# Patient Record
Sex: FEMALE | Race: BLACK OR AFRICAN AMERICAN | NOT HISPANIC OR LATINO | Employment: OTHER | URBAN - METROPOLITAN AREA
[De-identification: names, ages, dates, MRNs, and addresses within clinical notes are randomized per-mention and may not be internally consistent; named-entity substitution may affect disease eponyms.]

---

## 2017-01-06 ENCOUNTER — TELEPHONE (OUTPATIENT)
Dept: FAMILY MEDICINE | Facility: CLINIC | Age: 72
End: 2017-01-06

## 2017-01-06 NOTE — TELEPHONE ENCOUNTER
Reason for call:  Patient reporting a symptom    Symptom or request: voice gone, ears plugged, cough    Duration (how long have symptoms been present): a week    Have you been treated for this before? Yes    Additional comments:     Phone Number patient can be reached at:  Home number on file [phone] (home)    Best Time:      Can we leave a detailed message on this number:  YES    Call taken on 1/6/2017 at 4:03 PM by John Cordero

## 2017-01-06 NOTE — TELEPHONE ENCOUNTER
Patient recently took plane trip.  States grandkids have been sick also.    For past 7 days has had cough, plugged ears, and voice disappearing.  Denies sore throat.  Afebrile.  Has been pushing fluids and taking Tylenol PRN.  Also took Benadryl 1x; not effective, woke up with very dry mouth (will not take this again).  Generally feeling better than she was at start of URI.  Ears feel better, and cough isn't as frequent.    Advised rest, fluids, OTC meds as needed, and if not better to f/u on Monday or in UC this weekend.  Pt agrees with plan.  Eileen JORDAN RN

## 2017-01-09 ENCOUNTER — TELEPHONE (OUTPATIENT)
Dept: FAMILY MEDICINE | Facility: CLINIC | Age: 72
End: 2017-01-09

## 2017-01-09 ENCOUNTER — OFFICE VISIT (OUTPATIENT)
Dept: FAMILY MEDICINE | Facility: CLINIC | Age: 72
End: 2017-01-09
Payer: COMMERCIAL

## 2017-01-09 VITALS
HEART RATE: 102 BPM | HEIGHT: 65 IN | BODY MASS INDEX: 34.07 KG/M2 | WEIGHT: 204.5 LBS | OXYGEN SATURATION: 97 % | TEMPERATURE: 97.3 F

## 2017-01-09 DIAGNOSIS — J01.90 ACUTE SINUSITIS WITH SYMPTOMS > 10 DAYS: Primary | ICD-10-CM

## 2017-01-09 PROCEDURE — 99213 OFFICE O/P EST LOW 20 MIN: CPT | Performed by: PHYSICIAN ASSISTANT

## 2017-01-09 RX ORDER — DOXYCYCLINE 100 MG/1
100 CAPSULE ORAL 2 TIMES DAILY
Qty: 20 CAPSULE | Refills: 0 | Status: SHIPPED | OUTPATIENT
Start: 2017-01-09 | End: 2017-01-19

## 2017-01-09 NOTE — PROGRESS NOTES
"SUBJECTIVE:                  Harriet Lance is a 71 year old female patient, here alone, in today for:    Sore Throat:  yes   Onset: - Last Monday      Fever no    Chills/Sweats: YES-  Slight chill subsided    Headache (location?):  no    Sinus Pressure: no    Conjunctivitis:  no    Ear Pain:  YES    Rhinorrhea:  no    Congestion:  YES    Sore Throat:  Voice is gone horse voice   Cough:   YES-productive of green sputum    Wheeze:  no    Nausea:  no    Vomiting:  no    Diarrhea:  no    Fatigue/Achiness: YES    Sick/Strep Exposure:  Grand Kids   Therapies tried: tylenol   Outcome: moderate relief    She recently traveled, and on the place ride back, ear plugged and painful.  She progressed with cough and congestion.  Not taking anything OTC.      Problem list and histories reviewed & adjusted, as indicated.  Additional history: as documented    Problem list, Medication list, Allergies, and Medical/Social/Surgical histories reviewed in [facility] and updated as appropriate.    ROS:  Constitutional, HEENT, cardiovascular, pulmonary, gi and gu systems are negative, except as otherwise noted.    OBJECTIVE:                                                    BP   Pulse 102  Temp(Src) 97.3  F (36.3  C) (Tympanic)  Ht 5' 4.5\" (1.638 m)  Wt 204 lb 8 oz (92.761 kg)  BMI 34.57 kg/m2  SpO2 97%  Breastfeeding? No  Body mass index is 34.57 kg/(m^2).  GENERAL: alert and no distress  EYES: Eyes grossly normal to inspection  HENT: b/l TM dullness with loss of light reflux nasal mucosa is erythematous and edematous   NECK: no adenopathy, no asymmetry, masses, or scars and thyroid normal to palpation  RESP: lungs clear to auscultation - no rales, rhonchi or wheezes  CV: regular rate and rhythm, normal S1 S2, no S3 or S4, no murmur, click or rub, no peripheral edema and peripheral pulses strong  PSYCH: mentation appears normal, affect normal/bright    Diagnostic Test Results:  none      ASSESSMENT/PLAN:                                "                             1. Acute sinusitis with symptoms > 10 days  OTC probiotics while on antibiotic to help limit some potential side effects.   - doxycycline Monohydrate 100 MG CAPS; Take 1 capsule (100 mg) by mouth 2 times daily for 10 days  Dispense: 20 capsule; Refill: 0    Follow up if symptoms persist or worsen     George Zhao PA-C  [facility]

## 2017-01-09 NOTE — TELEPHONE ENCOUNTER
Name of caller: Harriet  Relationship to Patient: Self    Reason for Call: Patient states she would like a nurse to return call to discuss URI concerns. Please advise.     Best phone number to reach patient at is: [phone]  Ok to leave a message with medical info: Yes    Pharmacy preferred (if calling for a refill): NA

## 2017-01-09 NOTE — NURSING NOTE
"Chief Complaint   Patient presents with     URI     Pulse 102  Ht 5' 4.5\" (1.638 m)  Wt 204 lb 8 oz (92.761 kg)  BMI 34.57 kg/m2  SpO2 97%  Breastfeeding? No Estimated body mass index is 34.57 kg/(m^2) as calculated from the following:    Height as of this encounter: 5' 4.5\" (1.638 m).    Weight as of this encounter: 204 lb 8 oz (92.761 kg).  bp completed using cuff size: NA (Not Taken)      Health Maintenance addressed:  NONE    n/a                "

## 2017-01-09 NOTE — MR AVS SNAPSHOT
After Visit Summary   1/9/2017    Harriet Lance    MRN: [id number]           Patient Information     Date Of Birth          1945        Visit Information        Provider Department      1/9/2017 9:00 AM George Zhao PA-C [facility]        Today's Diagnoses     Acute sinusitis with symptoms > 10 days    -  1        Follow-ups after your visit        Your next 10 appointments already scheduled     Feb 06, 2017 10:15 AM   (Arrive by 10:00 AM)   MA SCREENING DIGITAL BILATERAL with [id number]   [facility] ([facility])    [address]  2nd Floor  [facility] [address] [phone]           Do not use any powder, lotion or deodorant under your arms or on your breast. If you do, we will ask you to remove it before your exam.  Wear comfortable, two-piece clothing.  If you have any allergies, tell your care team.  Bring any previous mammograms from other facilities or have them mailed to the breast center. This mammogram location, [facility], now offers 3D mammography. It doesn't replace a screening mammogram and can be done with a regular screening mammogram. It is optional and not all insurances will pay for it. 3D mammography is a special kind of mammogram that produces a three-dimensional image of the breast by using low dose-xrays. 3D allows the radiologist to see the breast tissue differently from 2D, which reduces the chance of repeat testing due to overlapping breast tissue. If you are interested in have a 3D mammogram, please check with your insurance before you arrive for your exam. 3D mammography is offered to all patients. On the day of your exam you will be asked to sign a form stating yes, you wish to have 3D imaging or, no, you decline.              Who to contact     If you have questions or need follow up information about today's clinic visit or your schedule please contact  "[facility] directly at [phone].  Normal or non-critical lab and imaging results will be communicated to you by MyChart, letter or phone within 4 business days after the clinic has received the results. If you do not hear from us within 7 days, please contact the clinic through PostalGuardhart or phone. If you have a critical or abnormal lab result, we will notify you by phone as soon as possible.  Submit refill requests through Borro or call your pharmacy and they will forward the refill request to us. Please allow 3 business days for your refill to be completed.          Additional Information About Your Visit        PostalGuardhart Information     Borro gives you secure access to your electronic health record. If you see a primary care provider, you can also send messages to your care team and make appointments. If you have questions, please call your primary care clinic.  If you do not have a primary care provider, please call [phone] and they will assist you.        Care EveryWhere ID     This is your Care EveryWhere ID. This could be used by other organizations to access your [place] medical records  [id number]        Your Vitals Were     Pulse Temperature Height BMI (Body Mass Index) Pulse Oximetry Breastfeeding?    102 97.3  F (36.3  C) (Tympanic) 5' 4.5\" (1.638 m) 34.57 kg/m2 97% No       Blood Pressure from Last 3 Encounters:   12/13/16 116/72   10/04/16 120/64   03/21/16 130/72    Weight from Last 3 Encounters:   01/09/17 204 lb 8 oz (92.761 kg)   12/13/16 209 lb 12.8 oz (95.165 kg)   10/04/16 220 lb (99.791 kg)              Today, you had the following     No orders found for display         Today's Medication Changes          These changes are accurate as of: 1/9/17  9:33 AM.  If you have any questions, ask your nurse or doctor.               Start taking these medicines.        Dose/Directions    doxycycline Monohydrate 100 MG Caps   Used for:  Acute sinusitis with symptoms > 10 days "   Started by:  George Zhao PA-C        Dose:  100 mg   Take 1 capsule (100 mg) by mouth 2 times daily for 10 days   Quantity:  20 capsule   Refills:  0            Where to get your medicines      These medications were sent to [id number] - [address] AT [address] [address] [address]     Phone:  [phone]    - doxycycline Monohydrate 100 MG Caps             Primary Care Provider Office Phone # Fax #    Jolanta DIAZ DO [place] [phone]       [facility] [address]  [facility] [address]        Thank you!     Thank you for choosing [facility]  for your care. Our goal is always to provide you with excellent care. Hearing back from our patients is one way we can continue to improve our services. Please take a few minutes to complete the written survey that you may receive in the mail after your visit with us. Thank you!             Your Updated Medication List - Protect others around you: Learn how to safely use, store and throw away your medicines at www.disposemymeds.org.          This list is accurate as of: 1/9/17  9:33 AM.  Always use your most recent med list.                   Brand Name Dispense Instructions for use    acetaminophen 325 MG tablet    TYLENOL    100 tablet    Take 1-2 tablets by mouth every 6 hours as needed for pain.       ALEVE PO      Take by mouth every morning       aspirin 81 MG tablet     100    1 tab po QD (Once per day)       calcium 600 MG tablet      Two capsules once epr day       Co Q10 100 MG Caps     30 capsule        doxycycline Monohydrate 100 MG Caps     20 capsule    Take 1 capsule (100 mg) by mouth 2 times daily for 10 days       fish oil-omega-3 fatty acids 1000 MG capsule      2 capsules once per day       Glucosamine HCl 1500 MG Tabs     30 tablet    Take by mouth daily       hydrochlorothiazide 25 MG tablet    HYDRODIURIL    90  tablet    Take 1 tablet (25 mg) by mouth daily       metoprolol 50 MG 24 hr tablet    TOPROL-XL    135 tablet    Take  by mouth daily. Take one at night and 1/2 tablet in the morning.       multivitamin Tabs per tablet     30 tablet    Take 2 tablets by mouth daily       pravastatin 40 MG tablet    PRAVACHOL    90 tablet    Take 1 tablet (40 mg) by mouth daily       vitamin D 2000 UNITS tablet     30    Take 1 tablet by mouth daily.       ZYRTEC 10 MG tablet   Generic drug:  cetirizine     30    1 TABLET DAILY

## 2017-01-09 NOTE — TELEPHONE ENCOUNTER
See TE from 1/6/2017    Throat now very sore from coughing.  Ear congestion continues/slightly worse.  Advised appt    Next 5 appointments (look out 90 days)     Jan 09, 2017  9:00 AM   SHORT with George Zhao PA-C   [facility] ([facility])    [address] [place]  [facility] [address]   [phone]                Eileen JORDAN RN

## 2017-01-12 ENCOUNTER — TELEPHONE (OUTPATIENT)
Dept: FAMILY MEDICINE | Facility: CLINIC | Age: 72
End: 2017-01-12

## 2017-01-12 NOTE — TELEPHONE ENCOUNTER
Reason for Call:  Other call back    Detailed comments: The Patient called to let TREMAINE Zhao know that she is still stuffy on the left side of her head  And she still has a cough. Also her BP has been high and tomorrow is her last day of pills, the patient wants to know what she should do next  Please call her back with advise for what to do next please      Phone Number Patient can be reached at: Home number on file [phone] (home)    Best Time: anytime    Can we leave a detailed message on this number? YES    Call taken on 1/12/2017 at 3:58 PM by Heather Orona

## 2017-01-13 NOTE — TELEPHONE ENCOUNTER
Spoke with patient.  Seen 1/9/17 for sinusitis.  Given Doxycycline.  States she is not feeling much better.  Informed patient she needs to complete the entire course of antibiotic.  Advised: push fluids, saline nasal spray, steam from warm shower or boiling pain of water on the stove/humidifer, warm moist cloth over her sinus area, under her eyes, cheekbones, OTC Tylenol or Ibuprofen for discomfort.    Patient to call back if symptoms worsen/has any other questions/concerns.  Patient verbalizes understanding.  Enid Shin RN

## 2017-01-26 ENCOUNTER — TELEPHONE (OUTPATIENT)
Dept: FAMILY MEDICINE | Facility: CLINIC | Age: 72
End: 2017-01-26

## 2017-01-26 NOTE — TELEPHONE ENCOUNTER
Reason for Call:  Other call back    Detailed comments: meds working to pt is still plugged up bad head hearing/voice still bad    Phone Number Patient can be reached at: Home number on file [phone] (home)    Best Time: any    Can we leave a detailed message on this number? YES    Call taken on 1/26/2017 at 10:34 AM by Devorah Hart

## 2017-03-03 ENCOUNTER — MYC MEDICAL ADVICE (OUTPATIENT)
Dept: FAMILY MEDICINE | Facility: CLINIC | Age: 72
End: 2017-03-03

## 2017-04-06 ENCOUNTER — TRANSFERRED RECORDS (OUTPATIENT)
Dept: HEALTH INFORMATION MANAGEMENT | Facility: CLINIC | Age: 72
End: 2017-04-06

## 2017-04-10 DIAGNOSIS — I82.409 DVT (DEEP VENOUS THROMBOSIS) (H): Primary | ICD-10-CM

## 2017-04-13 ENCOUNTER — TRANSFERRED RECORDS (OUTPATIENT)
Dept: HEALTH INFORMATION MANAGEMENT | Facility: CLINIC | Age: 72
End: 2017-04-13

## 2017-04-15 ENCOUNTER — TRANSFERRED RECORDS (OUTPATIENT)
Dept: HEALTH INFORMATION MANAGEMENT | Facility: CLINIC | Age: 72
End: 2017-04-15

## 2017-04-17 ENCOUNTER — TELEPHONE (OUTPATIENT)
Dept: FAMILY MEDICINE | Facility: CLINIC | Age: 72
End: 2017-04-17

## 2017-04-17 DIAGNOSIS — I82.409 DVT (DEEP VENOUS THROMBOSIS) (H): ICD-10-CM

## 2017-04-17 LAB — INR PPP: 1.55 (ref 0.86–1.14)

## 2017-04-17 PROCEDURE — 36415 COLL VENOUS BLD VENIPUNCTURE: CPT

## 2017-04-17 PROCEDURE — 85610 PROTHROMBIN TIME: CPT

## 2017-04-17 NOTE — TELEPHONE ENCOUNTER
Reason for call: Salo, sister, requesting today's INR to be faxed to [facility].    Phone Number Pt can be reached at:[phone]  Best Time: Anytime  Can we leave a detailed message on this number? Yes

## 2017-04-18 ENCOUNTER — TELEPHONE (OUTPATIENT)
Dept: FAMILY MEDICINE | Facility: CLINIC | Age: 72
End: 2017-04-18

## 2017-04-18 NOTE — TELEPHONE ENCOUNTER
Reason for Call:  INR results    Detailed comments: pt's sister called stating [facility]  Still hasn't received pt's INR levels  Please fax them asap to [phone]     Phone Number Patient can be reached at: [phone]    Best Time:     Can we leave a detailed message on this number? YES    Call taken on 4/18/2017 at 10:04 AM by Marzena Parsons

## 2017-04-18 NOTE — TELEPHONE ENCOUNTER
Patient's sister called back and asked that this gets taken care of  Pleaser call with her INR Level asap Please    Thank you

## 2017-04-18 NOTE — TELEPHONE ENCOUNTER
Erickson Ortiz in lab INR was faxed to [facility] end of the work day yesterday.  Gave patient's sister Salo the INR result from yesterday 1.55.  Patient hard of hearing - ok'd by patient to give result.  Enid Shin RN

## 2017-04-20 DIAGNOSIS — I82.409 DVT (DEEP VENOUS THROMBOSIS) (H): ICD-10-CM

## 2017-04-20 LAB — INR PPP: 3.6 (ref 0.86–1.14)

## 2017-04-20 PROCEDURE — 36416 COLLJ CAPILLARY BLOOD SPEC: CPT

## 2017-04-20 PROCEDURE — 85610 PROTHROMBIN TIME: CPT

## 2017-04-24 DIAGNOSIS — I82.409 DVT (DEEP VENOUS THROMBOSIS) (H): ICD-10-CM

## 2017-04-24 LAB — INR PPP: 2.1 (ref 0.86–1.14)

## 2017-04-24 PROCEDURE — 36416 COLLJ CAPILLARY BLOOD SPEC: CPT

## 2017-04-24 PROCEDURE — 85610 PROTHROMBIN TIME: CPT

## 2017-04-27 DIAGNOSIS — I82.409 DVT (DEEP VENOUS THROMBOSIS) (H): ICD-10-CM

## 2017-04-27 LAB — INR PPP: 2.8 (ref 0.86–1.14)

## 2017-04-27 PROCEDURE — 36416 COLLJ CAPILLARY BLOOD SPEC: CPT

## 2017-04-27 PROCEDURE — 85610 PROTHROMBIN TIME: CPT

## 2017-05-01 DIAGNOSIS — I82.409 DVT (DEEP VENOUS THROMBOSIS) (H): ICD-10-CM

## 2017-05-01 LAB — INR PPP: 2.8 (ref 0.86–1.14)

## 2017-05-01 PROCEDURE — 36416 COLLJ CAPILLARY BLOOD SPEC: CPT | Performed by: INTERNAL MEDICINE

## 2017-05-01 PROCEDURE — 85610 PROTHROMBIN TIME: CPT | Performed by: INTERNAL MEDICINE

## 2017-05-23 ENCOUNTER — TELEPHONE (OUTPATIENT)
Dept: FAMILY MEDICINE | Facility: CLINIC | Age: 72
End: 2017-05-23

## 2017-05-23 NOTE — TELEPHONE ENCOUNTER
Form(s) have been mailed.  Faxed or mailed to: patient's home address, and to preaddressed   Was a copy sent to scanning?   yes sent to scanning on [place] side    Maximiliano Hays CMA

## 2017-05-23 NOTE — TELEPHONE ENCOUNTER
Received form(s) from [facility] form - HR Employee Request for Accommodation.  Placed form(s) in/on PN's box.  Forms need to be filled out and signed and mailed- in envelope provided.    Call pt to verify form was sent: No  Copy needs to be sent for scanning after completion: Yes

## 2017-07-19 ENCOUNTER — TRANSFERRED RECORDS (OUTPATIENT)
Dept: HEALTH INFORMATION MANAGEMENT | Facility: CLINIC | Age: 72
End: 2017-07-19

## 2017-09-07 ENCOUNTER — RADIANT APPOINTMENT (OUTPATIENT)
Dept: GENERAL RADIOLOGY | Facility: CLINIC | Age: 72
End: 2017-09-07
Attending: PREVENTIVE MEDICINE
Payer: COMMERCIAL

## 2017-09-07 ENCOUNTER — OFFICE VISIT (OUTPATIENT)
Dept: FAMILY MEDICINE | Facility: CLINIC | Age: 72
End: 2017-09-07
Payer: COMMERCIAL

## 2017-09-07 ENCOUNTER — TELEPHONE (OUTPATIENT)
Dept: FAMILY MEDICINE | Facility: CLINIC | Age: 72
End: 2017-09-07

## 2017-09-07 VITALS
HEIGHT: 65 IN | HEART RATE: 70 BPM | SYSTOLIC BLOOD PRESSURE: 122 MMHG | BODY MASS INDEX: 34.05 KG/M2 | WEIGHT: 204.4 LBS | OXYGEN SATURATION: 96 % | DIASTOLIC BLOOD PRESSURE: 70 MMHG | TEMPERATURE: 97.9 F

## 2017-09-07 DIAGNOSIS — M25.562 LEFT KNEE PAIN, UNSPECIFIED CHRONICITY: Primary | ICD-10-CM

## 2017-09-07 DIAGNOSIS — M25.562 LEFT KNEE PAIN, UNSPECIFIED CHRONICITY: ICD-10-CM

## 2017-09-07 DIAGNOSIS — J30.1 CHRONIC SEASONAL ALLERGIC RHINITIS DUE TO POLLEN: ICD-10-CM

## 2017-09-07 DIAGNOSIS — R79.89 D-DIMER, ELEVATED: Primary | ICD-10-CM

## 2017-09-07 PROCEDURE — 36415 COLL VENOUS BLD VENIPUNCTURE: CPT | Performed by: PREVENTIVE MEDICINE

## 2017-09-07 PROCEDURE — 99214 OFFICE O/P EST MOD 30 MIN: CPT | Performed by: PREVENTIVE MEDICINE

## 2017-09-07 PROCEDURE — 85379 FIBRIN DEGRADATION QUANT: CPT | Performed by: PREVENTIVE MEDICINE

## 2017-09-07 PROCEDURE — 73562 X-RAY EXAM OF KNEE 3: CPT

## 2017-09-07 RX ORDER — FLUTICASONE PROPIONATE 50 MCG
2 SPRAY, SUSPENSION (ML) NASAL DAILY
Qty: 1 BOTTLE | Refills: 0 | Status: SHIPPED | OUTPATIENT
Start: 2017-09-07 | End: 2018-09-04

## 2017-09-07 NOTE — PROGRESS NOTES
"SUBJECTIVE:  Harriet Lance, a 72 year old female scheduled an appointment to discuss the following issues:     Left knee pain, unspecified chronicity  Chronic seasonal allergic rhinitis due to pollen  Pt with history of l hip OA and now with some pain behind her left knee for weeks, worse in past couple days  No trauma  No skin changes  No swelling, or warmth  History of r minnie 6 months ago, took coumadin for some time after for dvt prevention  No personal hx of blood clots   No recent significant travel  L hip hurts a lot which limits pt's mobility, uses cane  Also with some congestion and post nasal drip on and off    Medical, social, surgical, and family histories reviewed.    ROS:  C: NEGATIVE for fever, chills  E: NEGATIVE for vision changes   R: NEGATIVE for significant cough or SOB  CV: NEGATIVE for chest pain, palpitations   GI: NEGATIVE for nausea, abdominal pain, heartburn, or change in bowel habits  : NEGATIVE for frequency, dysuria, or hematuria  M: NEGATIVE for significant arthralgias or myalgia  N: NEGATIVE for weakness, dizziness or paresthesias or headache    OBJECTIVE:  /70  Pulse 70  Temp 97.9  F (36.6  C) (Oral)  Ht 5' 4.5\" (1.638 m)  Wt 204 lb 6.4 oz (92.7 kg)  SpO2 96%  BMI 34.54 kg/m2  EXAM:  GENERAL APPEARANCE: healthy, alert and no distress  EYES: EOMI,  PERRL  HENT: ear canals and TM's normal and nose and mouth without ulcers or lesions  RESP: lungs clear to auscultation - no rales, rhonchi or wheezes  CV: regular rates and rhythm, normal S1 S2, no S3 or S4 and no murmur, click or rub -  ABDOMEN:  soft, nontender, no HSM or masses and bowel sounds normal  MSK - left knee - medial jt line ttp, neg ant, post drawer, neg mcmurrarys, trace effusion, no warmth, no erythema, nl rom, nl gait  EXTREMITIES:  Warm, well perfused, no edema    ASSESSMENT/PLAN:  (M25.562) Left knee pain, unspecified chronicity  (primary encounter diagnosis)  Plan: D dimer, quantitative, XR Knee Left 1/2 " Views  ?OA knee vs referred pain from hip vs dvt (less likely)  Will do left knee xray  Also d dimer  Advise tylenol 500 mg tid prn for pain    (J30.1) Chronic seasonal allergic rhinitis due to pollen  Plan: fluticasone (FLONASE) 50 MCG/ACT spray  Will try flonase nasal spray    25 minutes spent with patient, over 50% time counseling, coordinating care and explaining about nature of the patient's conditions.  All risks, benefits of treatment and further evaluation was reviewed with patient.  Pt expressed understanding.  Pt was in agreement with this plan.  Ozzie Gonzalez MD

## 2017-09-07 NOTE — MR AVS SNAPSHOT
"              After Visit Summary   9/7/2017    Harriet Lance    MRN: [id number]           Patient Information     Date Of Birth          1945        Visit Information        Provider Department      9/7/2017 3:00 PM Ozzie Gonzalez MD [facility]         Follow-ups after your visit        Who to contact     If you have questions or need follow up information about today's clinic visit or your schedule please contact [facility] directly at [phone].  Normal or non-critical lab and imaging results will be communicated to you by MyChart, letter or phone within 4 business days after the clinic has received the results. If you do not hear from us within 7 days, please contact the clinic through Roamerhart or phone. If you have a critical or abnormal lab result, we will notify you by phone as soon as possible.  Submit refill requests through Merchantry or call your pharmacy and they will forward the refill request to us. Please allow 3 business days for your refill to be completed.          Additional Information About Your Visit        MyChart Information     Merchantry gives you secure access to your electronic health record. If you see a primary care provider, you can also send messages to your care team and make appointments. If you have questions, please call your primary care clinic.  If you do not have a primary care provider, please call [phone] and they will assist you.        Care EveryWhere ID     This is your Care EveryWhere ID. This could be used by other organizations to access your [place] medical records  [id number]        Your Vitals Were     Pulse Temperature Height Pulse Oximetry BMI (Body Mass Index)       70 97.9  F (36.6  C) (Oral) 5' 4.5\" (1.638 m) 96% 34.54 kg/m2        Blood Pressure from Last 3 Encounters:   09/07/17 122/70   12/13/16 116/72   10/04/16 120/64    Weight from Last 3 Encounters:   09/07/17 204 lb 6.4 oz (92.7 kg)   01/09/17 204 lb " 8 oz (92.8 kg)   12/13/16 209 lb 12.8 oz (95.2 kg)              Today, you had the following     No orders found for display       Primary Care Provider Office Phone # Fax #    Jolanta Wilkerson [phone] [address] [address]        Equal Access to Services     [facility]: Hadii aad ku hadasho Soomaali, waaxda luqadaha, qaybta kaalmada adeegyada, waxay idiin hayaan adeeg kharash laalirio . So [facility] [phone].    ATENCIÓN: Si habla español, tiene a saba disposición servicios gratuitos de asistencia lingüística. Llame al [phone].    We comply with applicable federal civil rights laws and [place] laws. We do not discriminate on the basis of race, color, national origin, age, disability sex, sexual orientation or gender identity.            Thank you!     Thank you for choosing [facility]  for your care. Our goal is always to provide you with excellent care. Hearing back from our patients is one way we can continue to improve our services. Please take a few minutes to complete the written survey that you may receive in the mail after your visit with us. Thank you!             Your Updated Medication List - Protect others around you: Learn how to safely use, store and throw away your medicines at www.disposemymeds.org.          This list is accurate as of: 9/7/17  3:26 PM.  Always use your most recent med list.                   Brand Name Dispense Instructions for use Diagnosis    acetaminophen 325 MG tablet    TYLENOL    100 tablet    Take 1-2 tablets by mouth every 6 hours as needed for pain.    Right thigh pain       ALEVE PO      Take by mouth every morning        aspirin 81 MG tablet     100    1 tab po QD (Once per day)        calcium 600 MG tablet      Two capsules once epr day        Co Q10 100 MG Caps     30 capsule         fish oil-omega-3 fatty acids 1000 MG capsule      2 capsules once per day        hydrochlorothiazide 25 MG tablet     HYDRODIURIL    90 tablet    Take 1 tablet (25 mg) by mouth daily    Hypertension goal BP (blood pressure) < 140/90       metoprolol 50 MG 24 hr tablet    TOPROL-XL    135 tablet    Take  by mouth daily. Take one at night and 1/2 tablet in the morning.    Hypertension goal BP (blood pressure) < 140/90       multivitamin Tabs per tablet     30 tablet    Take 2 tablets by mouth daily        pravastatin 40 MG tablet    PRAVACHOL    90 tablet    Take 1 tablet (40 mg) by mouth daily    Hyperlipidemia with target LDL less than 130       vitamin D 2000 UNITS tablet     30    Take 1 tablet by mouth daily.        ZYRTEC 10 MG tablet   Generic drug:  cetirizine     30    1 TABLET DAILY    Allergic rhinitis, cause unspecified

## 2017-09-07 NOTE — NURSING NOTE
"Chief Complaint   Patient presents with     Musculoskeletal Problem       Initial /70  Pulse 70  Temp 97.9  F (36.6  C) (Oral)  Ht 5' 4.5\" (1.638 m)  Wt 204 lb 6.4 oz (92.7 kg)  SpO2 96%  BMI 34.54 kg/m2 Estimated body mass index is 34.54 kg/(m^2) as calculated from the following:    Height as of this encounter: 5' 4.5\" (1.638 m).    Weight as of this encounter: 204 lb 6.4 oz (92.7 kg).  Medication Reconciliation: complete      Health Maintenance that is potentially due pending provider review:  Colonoscopy/FIT    Pt is already scheduled for colonoscopy on 102/2017 with [facility].    TANMAY Gore  "

## 2017-09-07 NOTE — TELEPHONE ENCOUNTER
Reason for call:  Patient reporting a symptom    Symptom or request: sharp pains in Left leg    Duration (how long have symptoms been present): intermittently for  Months, but getting worse recently    Have you been treated for this before? No    Additional comments: wondering what she can try to do about it    Phone Number patient can be reached at:  Work number on file:  [phone] (work)    Best Time:  anytime    Can we leave a detailed message on this number:  NO    Call taken on 9/7/2017 at 10:35 AM by Gayathri Harp

## 2017-09-07 NOTE — TELEPHONE ENCOUNTER
Sharp pain noted to (L) calf  Denies warmth and redness to area, SOB, chest pain  Pain started a couple days ago, is intermittent  Pain worst when at rest, but better with activity  States she is on Warfarin post hip surgery, but has not had a clot before.  Advised appt for assessment.    Next 5 appointments (look out 90 days)     Sep 07, 2017  3:00 PM CDT   Office Visit with Ozzie Gonzalez MD   [facility] [place] ([facility])    [address]  [facility] [address] [phone]                Eileen JORDAN RN

## 2017-09-08 LAB — D DIMER PPP FEU-MCNC: 1.4 UG/ML FEU (ref 0–0.5)

## 2017-09-11 ENCOUNTER — TELEPHONE (OUTPATIENT)
Dept: FAMILY MEDICINE | Facility: CLINIC | Age: 72
End: 2017-09-11

## 2017-09-11 NOTE — TELEPHONE ENCOUNTER
Notes Recorded by Ozzie Gonzalez MD on 9/11/2017 at 8:49 AM  Please call pt to let her know that her d dimer is elevated.  We cannot rule out a dvt in her left leg.  I have ordered a left leg ultrasound for her to further evaluate.    Below # is incorrect  Called patient at work # [phone] (work)  Informed her of message from Dr Gonzalez and gave her # to [facility] to schedule US    Also informed pt if new s/s (SOB, chest pain, stroke symptoms, etc., needs to be seen in ER right away)  Pt agrees with plan, will call to schedule US  Eileen JORDAN RN

## 2017-09-11 NOTE — TELEPHONE ENCOUNTER
Reason for Call:  Other     Detailed comments: patient wondering if and how she is suppose to   Follow up, if she is suppose to have imaging     Phone Number Patient can be reached at: Cell number on file:    [phone]       Best Time: anytime    Can we leave a detailed message on this number? YES    Call taken on 9/11/2017 at 3:48 PM by Gayathri Harp

## 2017-09-12 ENCOUNTER — TELEPHONE (OUTPATIENT)
Dept: FAMILY MEDICINE | Facility: CLINIC | Age: 72
End: 2017-09-12

## 2017-09-12 NOTE — TELEPHONE ENCOUNTER
Patient informed of result  States her question then is if she could have diabetes.    Reviewed recent lab results - last glucose 12/2016, noted by PN:  The glucose is a little high putting you at risk to develop diabetes.  We should plan to recheck in one year.     Patient scheduled physical for December 2017  Advised in the meantime pt work on diet and exercise  Patient states she thinks last results off d/t hip replacement    Will call if further questions/concerns prior to scheduled PE  Eileen JORDAN RN

## 2017-09-12 NOTE — TELEPHONE ENCOUNTER
Please call pt to let her know that her left lower extremity ultrasound shows no evidence of blood clot (dvt).

## 2017-09-29 ENCOUNTER — HEALTH MAINTENANCE LETTER (OUTPATIENT)
Age: 72
End: 2017-09-29

## 2017-10-02 ENCOUNTER — TRANSFERRED RECORDS (OUTPATIENT)
Dept: HEALTH INFORMATION MANAGEMENT | Facility: CLINIC | Age: 72
End: 2017-10-02

## 2017-10-11 PROBLEM — D12.6 BENIGN NEOPLASM OF COLON: Status: ACTIVE | Noted: 2017-10-11

## 2017-11-17 DIAGNOSIS — I10 HYPERTENSION GOAL BP (BLOOD PRESSURE) < 140/90: ICD-10-CM

## 2017-11-17 DIAGNOSIS — E78.5 HYPERLIPIDEMIA WITH TARGET LDL LESS THAN 130: ICD-10-CM

## 2017-11-17 RX ORDER — PRAVASTATIN SODIUM 40 MG
TABLET ORAL
Start: 2017-11-17

## 2017-11-17 RX ORDER — METOPROLOL SUCCINATE 50 MG/1
TABLET, EXTENDED RELEASE ORAL
Start: 2017-11-17

## 2017-11-17 NOTE — TELEPHONE ENCOUNTER
Denied both requests  Refill requests too early; Rx's sent 12/13/2017 for 1 year  Eileen JORDAN RN    Requested Prescriptions   Pending Prescriptions Disp Refills     pravastatin (PRAVACHOL) 40 MG tablet [Pharmacy Med Name: PRAVASTATIN 40MG TABLETS] 90 tablet 0     Sig: TAKE 1 TABLET(40 MG) BY MOUTH DAILY    Statins Protocol Passed    11/17/2017  8:31 AM       Passed - LDL on file in past 12 months    Recent Labs   Lab Test  12/13/16   1001   LDL  78            Passed - No abnormal creatine kinase in past 12 months    No lab results found.         Passed - Recent or future visit with authorizing provider    Patient had office visit in the last year or has a visit in the next 30 days with authorizing provider.  See chart review.              Passed - Patient is age 18 or older       Passed - No active pregnancy on record       Passed - No positive pregnancy test in past 12 months        metoprolol (TOPROL-XL) 50 MG 24 hr tablet [Pharmacy Med Name: METOPROLOL ER SUCCINATE 50MG TABS] 135 tablet 0     Sig: TAKE 1 AND ONE-HALF TABLET BY MOUTH EVERY MORNING    Beta-Blockers Protocol Passed    11/17/2017  8:31 AM       Passed - Blood pressure under 140/90    BP Readings from Last 3 Encounters:   09/07/17 122/70   12/13/16 116/72   10/04/16 120/64                Passed - Patient is age 6 or older       Passed - Recent or future visit with authorizing provider's specialty    Patient had office visit in the last year or has a visit in the next 30 days with authorizing provider.  See chart review.

## 2017-12-15 ENCOUNTER — OFFICE VISIT (OUTPATIENT)
Dept: FAMILY MEDICINE | Facility: CLINIC | Age: 72
End: 2017-12-15
Payer: COMMERCIAL

## 2017-12-15 VITALS
TEMPERATURE: 97.6 F | BODY MASS INDEX: 30.46 KG/M2 | HEART RATE: 72 BPM | WEIGHT: 182.8 LBS | SYSTOLIC BLOOD PRESSURE: 98 MMHG | HEIGHT: 65 IN | DIASTOLIC BLOOD PRESSURE: 56 MMHG

## 2017-12-15 DIAGNOSIS — Z00.00 ENCOUNTER FOR ROUTINE ADULT HEALTH EXAMINATION WITHOUT ABNORMAL FINDINGS: Primary | ICD-10-CM

## 2017-12-15 DIAGNOSIS — E78.5 HYPERLIPIDEMIA WITH TARGET LDL LESS THAN 130: ICD-10-CM

## 2017-12-15 DIAGNOSIS — Z23 VACCINE FOR DIPHTHERIA-TETANUS-PERTUSSIS, COMBINED: ICD-10-CM

## 2017-12-15 DIAGNOSIS — I10 HYPERTENSION GOAL BP (BLOOD PRESSURE) < 140/90: ICD-10-CM

## 2017-12-15 DIAGNOSIS — M16.10 ARTHRITIS OF HIP: ICD-10-CM

## 2017-12-15 LAB
ALBUMIN SERPL-MCNC: 3.5 G/DL (ref 3.4–5)
ALP SERPL-CCNC: 85 U/L (ref 40–150)
ALT SERPL W P-5'-P-CCNC: 20 U/L (ref 0–50)
ANION GAP SERPL CALCULATED.3IONS-SCNC: 10 MMOL/L (ref 3–14)
AST SERPL W P-5'-P-CCNC: 13 U/L (ref 0–45)
BILIRUB SERPL-MCNC: 0.3 MG/DL (ref 0.2–1.3)
BUN SERPL-MCNC: 15 MG/DL (ref 7–30)
CALCIUM SERPL-MCNC: 9.6 MG/DL (ref 8.5–10.1)
CHLORIDE SERPL-SCNC: 104 MMOL/L (ref 94–109)
CHOLEST SERPL-MCNC: 143 MG/DL
CO2 SERPL-SCNC: 26 MMOL/L (ref 20–32)
CREAT SERPL-MCNC: 0.89 MG/DL (ref 0.52–1.04)
ERYTHROCYTE [DISTWIDTH] IN BLOOD BY AUTOMATED COUNT: 15.9 % (ref 10–15)
GFR SERPL CREATININE-BSD FRML MDRD: 62 ML/MIN/1.7M2
GLUCOSE SERPL-MCNC: 89 MG/DL (ref 70–99)
HCT VFR BLD AUTO: 40.8 % (ref 35–47)
HDLC SERPL-MCNC: 51 MG/DL
HGB BLD-MCNC: 13.5 G/DL (ref 11.7–15.7)
LDLC SERPL CALC-MCNC: 67 MG/DL
MCH RBC QN AUTO: 25.4 PG (ref 26.5–33)
MCHC RBC AUTO-ENTMCNC: 33.1 G/DL (ref 31.5–36.5)
MCV RBC AUTO: 77 FL (ref 78–100)
NONHDLC SERPL-MCNC: 92 MG/DL
PLATELET # BLD AUTO: 278 10E9/L (ref 150–450)
POTASSIUM SERPL-SCNC: 3.1 MMOL/L (ref 3.4–5.3)
PROT SERPL-MCNC: 8.2 G/DL (ref 6.8–8.8)
RBC # BLD AUTO: 5.31 10E12/L (ref 3.8–5.2)
SODIUM SERPL-SCNC: 140 MMOL/L (ref 133–144)
TRIGL SERPL-MCNC: 125 MG/DL
WBC # BLD AUTO: 6.8 10E9/L (ref 4–11)

## 2017-12-15 PROCEDURE — 85027 COMPLETE CBC AUTOMATED: CPT | Performed by: FAMILY MEDICINE

## 2017-12-15 PROCEDURE — 90471 IMMUNIZATION ADMIN: CPT | Performed by: FAMILY MEDICINE

## 2017-12-15 PROCEDURE — 80053 COMPREHEN METABOLIC PANEL: CPT | Performed by: FAMILY MEDICINE

## 2017-12-15 PROCEDURE — 90715 TDAP VACCINE 7 YRS/> IM: CPT | Performed by: FAMILY MEDICINE

## 2017-12-15 PROCEDURE — 36415 COLL VENOUS BLD VENIPUNCTURE: CPT | Performed by: FAMILY MEDICINE

## 2017-12-15 PROCEDURE — 80061 LIPID PANEL: CPT | Performed by: FAMILY MEDICINE

## 2017-12-15 PROCEDURE — 99397 PER PM REEVAL EST PAT 65+ YR: CPT | Performed by: FAMILY MEDICINE

## 2017-12-15 RX ORDER — HYDROCHLOROTHIAZIDE 25 MG/1
25 TABLET ORAL DAILY
Qty: 90 TABLET | Refills: 3 | Status: SHIPPED | OUTPATIENT
Start: 2017-12-15 | End: 2018-11-28

## 2017-12-15 RX ORDER — METOPROLOL SUCCINATE 50 MG/1
TABLET, EXTENDED RELEASE ORAL
Qty: 90 TABLET | Refills: 3 | Status: SHIPPED | OUTPATIENT
Start: 2017-12-15 | End: 2018-03-27

## 2017-12-15 RX ORDER — PRAVASTATIN SODIUM 40 MG
40 TABLET ORAL DAILY
Qty: 90 TABLET | Refills: 3 | Status: SHIPPED | OUTPATIENT
Start: 2017-12-15 | End: 2018-11-28

## 2017-12-15 NOTE — PROGRESS NOTES
SUBJECTIVE:   Harriet Lance is a 72 year old female who presents for Preventive Visit.      Are you in the first 12 months of your Medicare Part B coverage?  No    Healthy Habits:    Do you get at least three servings of calcium containing foods daily (dairy, green leafy vegetables, etc.)? no    Amount of exercise or daily activities, outside of work: 3 day(s) per week    Problems taking medications regularly No    Medication side effects: No    Have you had an eye exam in the past two years? yes    Do you see a dentist twice per year? yes    Do you have sleep apnea, excessive snoring or daytime drowsiness?no      Ability to successfully perform activities of daily living: Yes, no assistance needed    Home safety:  none identified     Hearing impairment: Yes, hearing aids in both ears    Fall risk:  Fallen 2 or more times in the past year?: No  Any fall with injury in the past year?: No          COGNITIVE SCREEN  1) Repeat 3 items (Banana, Sunrise, Chair)    2) Clock draw: NORMAL  3) 3 item recall: Recalls 2 objects   Results: NORMAL clock, 1-2 items recalled: COGNITIVE IMPAIRMENT LESS LIKELY    Mini-CogTM Copyright CARTER Randhawa. Licensed by the author for use in [facility]; reprinted with permission (stefan@[email or web address]). All rights reserved.                -------------------------------------    Reviewed and updated as needed this visit by clinical staffTobacco  Allergies  Meds  Med Hx  Surg Hx  Fam Hx  Soc Hx        Reviewed and updated as needed this visit by Provider  Meds  Surg Hx        Social History   Substance Use Topics     Smoking status: Never Smoker     Smokeless tobacco: Never Used     Alcohol use 0.0 oz/week     0 Standard drinks or equivalent per week      Comment: rarely       If you drink alcohol do you typically have >3 drinks per day or >7 drinks per week? No                        Today's PHQ-2 Score:   PHQ-2 ( 1999 Pfizer) 12/15/2017 9/7/2017   Q1: Little interest or pleasure  in doing things 0 0   Q2: Feeling down, depressed or hopeless 0 0   PHQ-2 Score 0 0         Do you feel safe in your environment - YES    Do you have a Health Care Directive?: No: Advance care planning was reviewed with patient; patient declined at this time.      Current providers sharing in care for this patient include: Patient Care Team:  Jolanta Wilkerson DO as PCP - Jane Leyva APRN CNP as Nurse Practitioner (Neurology)  Amaury Ledesma MD as MD (Family Medicine - Sports Medicine)    The following health maintenance items are reviewed in Epic and correct as of today:  Health Maintenance   Topic Date Due     ADVANCE DIRECTIVE PLANNING Q5 YRS  08/01/2017     TETANUS IMMUNIZATION (SYSTEM ASSIGNED)  10/31/2017     FALL RISK ASSESSMENT  12/13/2017     MAMMO SCREEN Q2 YR (SYSTEM ASSIGNED)  02/06/2019     LIPID SCREEN Q5 YR FEMALE (SYSTEM ASSIGNED)  12/13/2021     COLONOSCOPY Q5 YR  10/02/2022     INFLUENZA VACCINE (SYSTEM ASSIGNED)  Completed     DEXA SCAN SCREENING (SYSTEM ASSIGNED)  Completed     PNEUMOCOCCAL  Completed     HEPATITIS C SCREENING  Completed     Patient Active Problem List   Diagnosis     Allergic rhinitis     Diffuse cystic mastopathy     History of malignant neoplasm of large intestine     Enlarged lymph nodes     CARDIOVASCULAR SCREENING; LDL GOAL LESS THAN 100     Glaucoma     Hypertension goal BP (blood pressure) < 140/90     Advanced directives, counseling/discussion     Arthritis of hip     HL (hearing loss)     Hyperlipidemia with target LDL less than 130     BMI 37.0-37.9, adult     Benign neoplasm of colon     Past Surgical History:   Procedure Laterality Date     AS TOTAL HIP ARTHROPLASTY Right 04/13/2017    done at [facility]     C REMOVAL COLON/ILEOSTOMY  1998    8 inches of colon removed - not clear what section       Social History   Substance Use Topics     Smoking status: Never Smoker     Smokeless tobacco: Never Used     Alcohol use 0.0 oz/week  "    0 Standard drinks or equivalent per week      Comment: rarely     Family History   Problem Relation Age of Onset     HEART DISEASE Mother       of heart Attack at age 75     CANCER Father      Prostate     DIABETES Father      Type II     Cancer - colorectal Maternal Grandmother      Colon Resection     Breast Cancer Maternal Grandmother      Mastectomy     Cancer - colorectal Maternal Uncle      Hypertension Mother      Prostate Cancer Father      Allergies Father              Mammogram Screening: Patient over age 50, mutual decision to screen reflected in health maintenance.  Has lost weight - intentional   Feels a little dizziness if she stands to quickly -       ROS:  Constitutional, HEENT, cardiovascular, pulmonary, GI, , musculoskeletal, neuro, skin, endocrine and psych systems are negative, except as otherwise noted.      OBJECTIVE:   BP 98/56  Pulse 72  Temp 97.6  F (36.4  C) (Oral)  Ht 5' 4.5\" (1.638 m)  Wt 182 lb 12.8 oz (82.9 kg)  BMI 30.89 kg/m2 Estimated body mass index is 30.89 kg/(m^2) as calculated from the following:    Height as of this encounter: 5' 4.5\" (1.638 m).    Weight as of this encounter: 182 lb 12.8 oz (82.9 kg).  EXAM:   GENERAL APPEARANCE: alert and no distress  EYES: Eyes grossly normal to inspection, PERRL and conjunctivae and sclerae normal  HENT: ear canals and TM's normal, nose and mouth without ulcers or lesions, oropharynx clear and oral mucous membranes moist  NECK: no adenopathy, no asymmetry, masses, or scars and thyroid normal to palpation  RESP: lungs clear to auscultation - no rales, rhonchi or wheezes  BREAST: normal without masses, tenderness or nipple discharge and no palpable axillary masses or adenopathy  CV: regular rate and rhythm, normal S1 S2, no S3 or S4, no murmur, click or rub, no peripheral edema and peripheral pulses strong  ABDOMEN: soft, nontender, no hepatosplenomegaly, no masses and bowel sounds normal  MS: no musculoskeletal defects are " "noted and gait is age appropriate without ataxia  SKIN: no suspicious lesions or rashes  NEURO: Normal strength and tone, sensory exam grossly normal, mentation intact and speech normal  PSYCH: mentation appears normal and affect normal/bright    ASSESSMENT / PLAN:   1. Encounter for routine adult health examination without abnormal findings  Routine screening    2. Hypertension goal BP (blood pressure) < 140/90  BP controlled and with weight loss this is lower and symptomatic at times  Will decerase toprol dose - was on 50mg HS and 25mg in AM  Will drop the AM dose  Continue HCTZ - labs pending  - metoprolol (TOPROL-XL) 50 MG 24 hr tablet; Take one tablet at bedtime  Dispense: 90 tablet; Refill: 3  - hydrochlorothiazide (HYDRODIURIL) 25 MG tablet; Take 1 tablet (25 mg) by mouth daily  Dispense: 90 tablet; Refill: 3  - Comprehensive metabolic panel (BMP + Alb, Alk Phos, ALT, AST, Total. Bili, TP)  - Lipid panel reflex to direct LDL Fasting  - CBC with platelets    3. Hyperlipidemia with target LDL less than 130  Well controlled   Rechecking labs  - pravastatin (PRAVACHOL) 40 MG tablet; Take 1 tablet (40 mg) by mouth daily  Dispense: 90 tablet; Refill: 3  - Comprehensive metabolic panel (BMP + Alb, Alk Phos, ALT, AST, Total. Bili, TP)  - Lipid panel reflex to direct LDL Fasting  - CBC with platelets    4. Arthritis of hip  At [place] - had replaced     5. Vaccine for diphtheria-tetanus-pertussis, combined  given  - TDAP (ADACEL)    End of Life Planning:  Patient currently has an advanced directive: Yes.  Practitioner is supportive of decision.    COUNSELING:  Reviewed preventive health counseling, as reflected in patient instructions        Estimated body mass index is 30.89 kg/(m^2) as calculated from the following:    Height as of this encounter: 5' 4.5\" (1.638 m).    Weight as of this encounter: 182 lb 12.8 oz (82.9 kg).  Weight management plan: Discussed healthy diet and exercise guidelines and patient will follow " up in 12 months in clinic to re-evaluate.     reports that she has never smoked. She has never used smokeless tobacco.        Appropriate preventive services were discussed with this patient, including applicable screening as appropriate for cardiovascular disease, diabetes, osteopenia/osteoporosis, and glaucoma.  As appropriate for age/gender, discussed screening for colorectal cancer, prostate cancer, breast cancer, and cervical cancer. Checklist reviewing preventive services available has been given to the patient.    Reviewed patients plan of care and provided an AVS. The Basic Care Plan (routine screening as documented in Health Maintenance) for [place] meets the Care Plan requirement. This Care Plan has been established and reviewed with the Patient.    Counseling Resources:  ATP IV Guidelines  Pooled Cohorts Equation Calculator  Breast Cancer Risk Calculator  FRAX Risk Assessment  ICSI Preventive Guidelines  Dietary Guidelines for Americans, 2010  USDA's MyPlate  ASA Prophylaxis  Lung CA Screening    Jolanta Wilkerson,   [facility]

## 2017-12-15 NOTE — MR AVS SNAPSHOT
After Visit Summary   12/15/2017    Harriet Lance    MRN: [id number]           Patient Information     Date Of Birth          1945        Visit Information        Provider Department      12/15/2017 9:00 AM Jolanta Wilkerson DO [facility]        Today's Diagnoses     Encounter for routine adult health examination without abnormal findings    -  1    Hypertension goal BP (blood pressure) < 140/90        Hyperlipidemia with target LDL less than 130        Arthritis of hip        Vaccine for diphtheria-tetanus-pertussis, combined          Care Instructions      Preventive Health Recommendations  Female Ages 65 +    Yearly exam:     See your health care provider every year in order to  o Review health changes.   o Discuss preventive care.    o Review your medicines if your doctor has prescribed any.      You no longer need a yearly Pap test unless you've had an abnormal Pap test in the past 10 years. If you have vaginal symptoms, such as bleeding or discharge, be sure to talk with your provider about a Pap test.      Every 1 to 2 years, have a mammogram.  If you are over 69, talk with your health care provider about whether or not you want to continue having screening mammograms.      Every 10 years, have a colonoscopy. Or, have a yearly FIT test (stool test). These exams will check for colon cancer.       Have a cholesterol test every 5 years, or more often if your doctor advises it.       Have a diabetes test (fasting glucose) every three years. If you are at risk for diabetes, you should have this test more often.       At age 65, have a bone density scan (DEXA) to check for osteoporosis (brittle bone disease).    Shots:    Get a flu shot each year.    Get a tetanus shot every 10 years.    Talk to your doctor about your pneumonia vaccines. There are now two you should receive - Pneumovax (PPSV 23) and Prevnar (PCV 13).    Talk to your doctor about the shingles vaccine.    Talk to your  doctor about the hepatitis B vaccine.    Nutrition:     Eat at least 5 servings of fruits and vegetables each day.      Eat whole-grain bread, whole-wheat pasta and brown rice instead of white grains and rice.      Talk to your provider about Calcium and Vitamin D.     Lifestyle    Exercise at least 150 minutes a week (30 minutes a day, 5 days a week). This will help you control your weight and prevent disease.      Limit alcohol to one drink per day.      No smoking.       Wear sunscreen to prevent skin cancer.       See your dentist twice a year for an exam and cleaning.      See your eye doctor every 1 to 2 years to screen for conditions such as glaucoma, macular degeneration, cataracts, etc   Preventive Health Recommendations    Female Ages 65 +    Yearly exam:   See your health care provider every year in order to  Review health changes.   Discuss preventive care.    Review your medicines if your doctor has prescribed any.    You no longer need a yearly Pap test unless you've had an abnormal Pap test in the past 10 years. If you have vaginal symptoms, such as bleeding or discharge, be sure to talk with your provider about a Pap test.    Every 1 to 2 years, have a mammogram.  If you are over 69, talk with your health care provider about whether or not you want to continue having screening mammograms.    Every 10 years, have a colonoscopy. Or, have a yearly FIT test (stool test). These exams will check for colon cancer.     Have a cholesterol test every 5 years, or more often if your doctor advises it.     Have a diabetes test (fasting glucose) every three years. If you are at risk for diabetes, you should have this test more often.     At age 65, have a bone density scan (DEXA) to check for osteoporosis (brittle bone disease).    Shots:  Get a flu shot each year.  Get a tetanus shot every 10 years.  Talk to your doctor about your pneumonia vaccines. There are now two you should receive - Pneumovax (PPSV 23) and  Prevnar (PCV 13).  Talk to your doctor about the shingles vaccine.  Talk to your doctor about the hepatitis B vaccine.    Nutrition:   Eat at least 5 servings of fruits and vegetables each day.    Eat whole-grain bread, whole-wheat pasta and brown rice instead of white grains and rice.    Talk to your provider about Calcium and Vitamin D.     Lifestyle  Exercise at least 150 minutes a week (30 minutes a day, 5 days a week). This will help you control your weight and prevent disease.    Limit alcohol to one drink per day.    No smoking.     Wear sunscreen to prevent skin cancer.     See your dentist twice a year for an exam and cleaning.    See your eye doctor every 1 to 2 years to screen for conditions such as glaucoma, macular degeneration and cataracts.          Follow-ups after your visit        Who to contact     If you have questions or need follow up information about today's clinic visit or your schedule please contact [facility] directly at [phone].  Normal or non-critical lab and imaging results will be communicated to you by [employer], letter or phone within 4 business days after the clinic has received the results. If you do not hear from us within 7 days, please contact the clinic through [employer] or phone. If you have a critical or abnormal lab result, we will notify you by phone as soon as possible.  Submit refill requests through [employer] or call your pharmacy and they will forward the refill request to us. Please allow 3 business days for your refill to be completed.          Additional Information About Your Visit        [employer] Information     [employer] gives you secure access to your electronic health record. If you see a primary care provider, you can also send messages to your care team and make appointments. If you have questions, please call your primary care clinic.  If you do not have a primary care provider, please call [phone] and they will assist you.        Care  "EveryWhere ID     This is your Care EveryWhere ID. This could be used by other organizations to access your [facility] medical records  [id number]        Your Vitals Were     Pulse Temperature Height BMI (Body Mass Index)          72 97.6  F (36.4  C) (Oral) 5' 4.5\" (1.638 m) 30.89 kg/m2         Blood Pressure from Last 3 Encounters:   12/15/17 98/56   09/07/17 122/70   12/13/16 116/72    Weight from Last 3 Encounters:   12/15/17 182 lb 12.8 oz (82.9 kg)   09/07/17 204 lb 6.4 oz (92.7 kg)   01/09/17 204 lb 8 oz (92.8 kg)              We Performed the Following     CBC with platelets     Comprehensive metabolic panel (BMP + Alb, Alk Phos, ALT, AST, Total. Bili, TP)     Lipid panel reflex to direct LDL Fasting     TDAP (ADACEL)          Today's Medication Changes          These changes are accurate as of: 12/15/17  3:11 PM.  If you have any questions, ask your nurse or doctor.               These medicines have changed or have updated prescriptions.        Dose/Directions    metoprolol 50 MG 24 hr tablet   Commonly known as:  TOPROL-XL   This may have changed:  additional instructions   Used for:  Hypertension goal BP (blood pressure) < 140/90   Changed by:  Jolanta Wilkerson DO        Take one tablet at bedtime   Quantity:  90 tablet   Refills:  3            Where to get your medicines      These medications were sent to [facility] [address] [place] [address] AT [address] [place] [id number]     Phone:  [phone]     hydrochlorothiazide 25 MG tablet    metoprolol 50 MG 24 hr tablet    pravastatin 40 MG tablet                Primary Care Provider Office Phone # Fax #    Jolanta Wilkerson [phone] [address] [address]        Equal Access to Services     MICHELLE THOMAS AH: Abhilash knighto Somilagros, waaxda luqadaha, qaybta kaalmada adeegyada, ga carr . [facility] " [phone].    ATENCIÓN: Si eron cotto, tiene a saba disposición servicios gratuitos de asistencia lingüística. Pradip moore [phone].    We comply with applicable federal civil rights laws and [place] laws. We do not discriminate on the basis of race, color, national origin, age, disability, sex, sexual orientation, or gender identity.            Thank you!     Thank you for choosing [facility]  for your care. Our goal is always to provide you with excellent care. Hearing back from our patients is one way we can continue to improve our services. Please take a few minutes to complete the written survey that you may receive in the mail after your visit with us. Thank you!             Your Updated Medication List - Protect others around you: Learn how to safely use, store and throw away your medicines at www.disposemymeds.org.          This list is accurate as of: 12/15/17  3:11 PM.  Always use your most recent med list.                   Brand Name Dispense Instructions for use Diagnosis    acetaminophen 325 MG tablet    TYLENOL    100 tablet    Take 1-2 tablets by mouth every 6 hours as needed for pain.    Right thigh pain       ALEVE PO      Take by mouth every morning        aspirin 81 MG tablet     100    1 tab po QD (Once per day)        calcium 600 MG tablet      Two capsules once epr day        Co Q10 100 MG Caps     30 capsule         fish oil-omega-3 fatty acids 1000 MG capsule      2 capsules once per day        fluticasone 50 MCG/ACT spray    FLONASE    1 Bottle    Spray 2 sprays into both nostrils daily    Chronic seasonal allergic rhinitis due to pollen       hydrochlorothiazide 25 MG tablet    HYDRODIURIL    90 tablet    Take 1 tablet (25 mg) by mouth daily    Hypertension goal BP (blood pressure) < 140/90       metoprolol 50 MG 24 hr tablet    TOPROL-XL    90 tablet    Take one tablet at bedtime    Hypertension goal BP (blood pressure) < 140/90       multivitamin Tabs per tablet     30  tablet    Take 2 tablets by mouth daily        pravastatin 40 MG tablet    PRAVACHOL    90 tablet    Take 1 tablet (40 mg) by mouth daily    Hyperlipidemia with target LDL less than 130       vitamin D 2000 UNITS tablet     30    Take 1 tablet by mouth daily.        ZYRTEC 10 MG tablet   Generic drug:  cetirizine     30    1 TABLET DAILY    Allergic rhinitis, cause unspecified

## 2017-12-15 NOTE — NURSING NOTE
"Chief Complaint   Patient presents with     Physical     pt is fasting     BP 98/56  Pulse 72  Temp 97.6  F (36.4  C) (Oral)  Ht 5' 4.5\" (1.638 m)  Wt 182 lb 12.8 oz (82.9 kg)  BMI 30.89 kg/m2 Estimated body mass index is 30.89 kg/(m^2) as calculated from the following:    Height as of this encounter: 5' 4.5\" (1.638 m).    Weight as of this encounter: 182 lb 12.8 oz (82.9 kg).  Medication Reconciliation: complete      Health Maintenance due pending provider review:  NONE    n/a    Dawna Edwards CMA  "

## 2017-12-15 NOTE — PROGRESS NOTES
"   SUBJECTIVE:   CC: Harriet Lance is an 72 year old woman who presents for preventive health visit.     Healthy Habits:    Do you get at least three servings of calcium containing foods daily (dairy, green leafy vegetables, etc.)? {YES/NO, DAIRY INTAKE:706064::\"yes\"}    Amount of exercise or daily activities, outside of work: {AMOUNT EXERCISE:408889}    Problems taking medications regularly {Yes /No default:792412::\"No\"}    Medication side effects: {Yes /No default.:599863::\"No\"}    Have you had an eye exam in the past two years? {YESNOBLANK:433247}    Do you see a dentist twice per year? {YESNOBLANK:207949}    Do you have sleep apnea, excessive snoring or daytime drowsiness?{YESNOBLANK:508755}  {Outside tests to abstract? :252630}    {additional problems to add (Optional):126161}    Today's PHQ-2 Score:   PHQ-2 ( 1999 [employer]) 9/7/2017 1/9/2017   Q1: Little interest or pleasure in doing things 0 0   Q2: Feeling down, depressed or hopeless 0 0   PHQ-2 Score 0 0     {PHQ-2 LOOK IN ASSESSMENTS (Optional) :413457}  Abuse: Current or Past(Physical, Sexual or Emotional)- {YES/NO/NA:226536}  Do you feel safe in your environment - {YES/NO/NA:854557}    Social History   Substance Use Topics     Smoking status: Never Smoker     Smokeless tobacco: Never Used     Alcohol use 0.0 oz/week     0 Standard drinks or equivalent per week      Comment: rarely     If you drink alcohol do you typically have >3 drinks per day or >7 drinks per week? {ETOH :398767}                     Reviewed orders with patient.  Reviewed health maintenance and updated orders accordingly - {Yes/No:770395::\"Yes\"}  {Chronicprobdata (Optional):390319}    {Mammo Decision Support (Optional):792764}    Pertinent mammograms are reviewed under the imaging tab.  History of abnormal Pap smear: {PAP HX:296083}    Reviewed and updated as needed this visit by clinical staff  Tobacco  Allergies  Meds  Med Hx  Surg Hx  Fam Hx  Soc Hx        Reviewed and " "updated as needed this visit by Provider        {HISTORY OPTIONS (Optional):369680}    ROS:  {FEMALE PREVENTATIVE ROS:046194}    OBJECTIVE:   There were no vitals taken for this visit.  EXAM:  {Exam Choices:608185}    ASSESSMENT/PLAN:   {Diag Picklist:328172}    COUNSELING:   {FEMALE COUNSELING MESSAGES:973061::\"Reviewed preventive health counseling, as reflected in patient instructions\"}    {BP Counseling- Complete if BP >= 120/80  (Optional):501340}     reports that she has never smoked. She has never used smokeless tobacco.  {Tobacco Cessation -- Complete if patient is a smoker (Optional):067983}  Estimated body mass index is 34.54 kg/(m^2) as calculated from the following:    Height as of 9/7/17: 5' 4.5\" (1.638 m).    Weight as of 9/7/17: 204 lb 6.4 oz (92.7 kg).   {Weight Management Plan (ACO) Complete if BMI is abnormal-  Ages 18-64  BMI >24.9.  Age 65+ with BMI <23 or >30 (Optional):746209}    Counseling Resources:  ATP IV Guidelines  Pooled Cohorts Equation Calculator  Breast Cancer Risk Calculator  FRAX Risk Assessment  ICSI Preventive Guidelines  Dietary Guidelines for Americans, 2010  USDA's MyPlate  ASA Prophylaxis  Lung CA Screening    Jolanta Wilkerson, DO  [facility]  "

## 2017-12-15 NOTE — PROGRESS NOTES
Dear Harriet,   Your test results are all back -   -Liver and gallbladder tests (ALT,AST, Alk phos,bilirubin) are normal.  -Kidney function (GFR) is normal.  -Sodium is normal.  -Potassium is decreased. Plan to eat a banana daily  ADVISE: recheck in 1 month (BMP, DX: hypokalemia).  -Glucose (diabetic screening test) is normal.  -Cholesterol levels (LDL,HDL, Triglycerides) are normal.  ADVISE: rechecking in 1 year.  -Normal red blood cell (hgb) levels, normal white blood cell count and normal platelet levels.  Let us know if you have any questions.  -Jolanta Wilkerson, DO

## 2018-01-10 ENCOUNTER — MYC MEDICAL ADVICE (OUTPATIENT)
Dept: FAMILY MEDICINE | Facility: CLINIC | Age: 73
End: 2018-01-10

## 2018-01-10 DIAGNOSIS — E87.6 HYPOKALEMIA: Primary | ICD-10-CM

## 2018-01-11 NOTE — TELEPHONE ENCOUNTER
PN  Please see [email or web address] message below.  Also need future BMP order signed.  Order T'd up.  Thanks, Enid Shin RN

## 2018-01-11 NOTE — TELEPHONE ENCOUNTER
Note from 12/15/17 lab results:  Potassium is decreased. Plan to eat a banana daily  ADVISE: recheck in 1 month (BMP, DX: hypokalemia).     No future order signed.  Will have to send to PN.  Sent patient [employer] message asking her if she has been eating a banana every day.  Enid Shin RN

## 2018-01-16 ENCOUNTER — MYC MEDICAL ADVICE (OUTPATIENT)
Dept: FAMILY MEDICINE | Facility: CLINIC | Age: 73
End: 2018-01-16

## 2018-01-26 ENCOUNTER — MYC MEDICAL ADVICE (OUTPATIENT)
Dept: FAMILY MEDICINE | Facility: CLINIC | Age: 73
End: 2018-01-26

## 2018-02-07 ENCOUNTER — RADIANT APPOINTMENT (OUTPATIENT)
Dept: MAMMOGRAPHY | Facility: CLINIC | Age: 73
End: 2018-02-07
Payer: COMMERCIAL

## 2018-02-07 DIAGNOSIS — Z12.31 VISIT FOR SCREENING MAMMOGRAM: ICD-10-CM

## 2018-03-04 ENCOUNTER — MYC MEDICAL ADVICE (OUTPATIENT)
Dept: FAMILY MEDICINE | Facility: CLINIC | Age: 73
End: 2018-03-04

## 2018-03-05 ENCOUNTER — MYC MEDICAL ADVICE (OUTPATIENT)
Dept: FAMILY MEDICINE | Facility: CLINIC | Age: 73
End: 2018-03-05

## 2018-03-05 NOTE — TELEPHONE ENCOUNTER
OK to Monitor symptoms- stay hydrated    if she would like to be seen in clinic- I can see her this PM

## 2018-03-05 NOTE — TELEPHONE ENCOUNTER
AS,  Please see below and advise in PN's absence or ok to wait for her?  Please advise.  Thanks,  Elizabeth Noe RN

## 2018-03-06 ENCOUNTER — MYC MEDICAL ADVICE (OUTPATIENT)
Dept: FAMILY MEDICINE | Facility: CLINIC | Age: 73
End: 2018-03-06

## 2018-03-13 ENCOUNTER — MYC MEDICAL ADVICE (OUTPATIENT)
Dept: FAMILY MEDICINE | Facility: CLINIC | Age: 73
End: 2018-03-13

## 2018-03-13 NOTE — TELEPHONE ENCOUNTER
PN,   Please see below [employer] message and advise  Team can start handicap form if you agree  Thanks,  Eileen JORDAN RN

## 2018-03-13 NOTE — TELEPHONE ENCOUNTER
The handicap sticker - have her contact orthopedics if it is for her hip issues.    Regarding vaccines and other - looks like she is UTD on most   New shingles vaccine should come out soon (still dont have it but when we do we would recommend)_  Thanks  PN

## 2018-03-18 ENCOUNTER — MYC MEDICAL ADVICE (OUTPATIENT)
Dept: FAMILY MEDICINE | Facility: CLINIC | Age: 73
End: 2018-03-18

## 2018-03-20 ENCOUNTER — MYC MEDICAL ADVICE (OUTPATIENT)
Dept: FAMILY MEDICINE | Facility: CLINIC | Age: 73
End: 2018-03-20

## 2018-03-20 DIAGNOSIS — I10 HYPERTENSION GOAL BP (BLOOD PRESSURE) < 140/90: ICD-10-CM

## 2018-03-21 NOTE — TELEPHONE ENCOUNTER
I was thinking the HCTZ - having her take 1/2 tablet  But if she prefers we can decrease her toprol XL to 25mg   Please call and verify what she is taking   Thanks  PN

## 2018-03-21 NOTE — TELEPHONE ENCOUNTER
LDVM- need verification of what medications and doses she is taking. Per med list should be taking 1 -  25mg tab daily, but pt says in MyChart is taking 50 mg daily.  See pt Yvan Felipe, RN

## 2018-03-26 NOTE — TELEPHONE ENCOUNTER
Pt has not called back  Sent [employer] message to her advising she call clinic  Eileen JORDAN RN

## 2018-03-27 RX ORDER — METOPROLOL SUCCINATE 50 MG/1
TABLET, EXTENDED RELEASE ORAL
Qty: 90 TABLET | Refills: 3 | COMMUNITY
Start: 2018-03-27 | End: 2018-06-04

## 2018-03-27 NOTE — TELEPHONE ENCOUNTER
PN,  FYI:  Patient confirmed she's on Metoprolol 50mg and HCTZ 25mg   States the HCTZ is too small to cut  She would instead like to switch to Metoprolol 25mg - those pills are easier to cut  Eileen JODRAN RN

## 2018-03-27 NOTE — TELEPHONE ENCOUNTER
She is taking the same BP med she has always taken.  She just picked up a 90 days worth.  She is not sure where the confusion is.  On the first e-mail she sent she did include the name of the medication.  She will be available at her work number.

## 2018-05-24 DIAGNOSIS — E87.6 HYPOKALEMIA: ICD-10-CM

## 2018-05-24 LAB
ANION GAP SERPL CALCULATED.3IONS-SCNC: 8 MMOL/L (ref 3–14)
BUN SERPL-MCNC: 13 MG/DL (ref 7–30)
CALCIUM SERPL-MCNC: 9.2 MG/DL (ref 8.5–10.1)
CHLORIDE SERPL-SCNC: 107 MMOL/L (ref 94–109)
CO2 SERPL-SCNC: 24 MMOL/L (ref 20–32)
CREAT SERPL-MCNC: 0.85 MG/DL (ref 0.52–1.04)
GFR SERPL CREATININE-BSD FRML MDRD: 66 ML/MIN/1.7M2
GLUCOSE SERPL-MCNC: 97 MG/DL (ref 70–99)
POTASSIUM SERPL-SCNC: 3.4 MMOL/L (ref 3.4–5.3)
SODIUM SERPL-SCNC: 139 MMOL/L (ref 133–144)

## 2018-05-24 PROCEDURE — 36415 COLL VENOUS BLD VENIPUNCTURE: CPT | Performed by: FAMILY MEDICINE

## 2018-05-24 PROCEDURE — 80048 BASIC METABOLIC PNL TOTAL CA: CPT | Performed by: FAMILY MEDICINE

## 2018-05-25 NOTE — PROGRESS NOTES
Dear Harriet,   Your test results are all back -   -All of your labs are normal.  Let us know if you have any questions.  -Jolanta Wilkerson, DO

## 2018-06-03 ENCOUNTER — MYC MEDICAL ADVICE (OUTPATIENT)
Dept: FAMILY MEDICINE | Facility: CLINIC | Age: 73
End: 2018-06-03

## 2018-06-03 DIAGNOSIS — I10 HYPERTENSION GOAL BP (BLOOD PRESSURE) < 140/90: ICD-10-CM

## 2018-06-04 RX ORDER — METOPROLOL SUCCINATE 25 MG/1
25 TABLET, EXTENDED RELEASE ORAL AT BEDTIME
Qty: 90 TABLET | Refills: 0 | Status: SHIPPED | OUTPATIENT
Start: 2018-06-04 | End: 2018-08-28

## 2018-06-04 NOTE — TELEPHONE ENCOUNTER
PN,   Please see below [employer] [facility]  Med listed as historical  Pended refill for approval  Thanks,  Eileen JORDAN RN

## 2018-06-18 ENCOUNTER — MYC MEDICAL ADVICE (OUTPATIENT)
Dept: FAMILY MEDICINE | Facility: CLINIC | Age: 73
End: 2018-06-18

## 2018-06-18 DIAGNOSIS — Z79.01 LONG TERM CURRENT USE OF ANTICOAGULANT THERAPY: ICD-10-CM

## 2018-06-18 LAB — INR PPP: 1.01 (ref 0.86–1.14)

## 2018-06-18 PROCEDURE — 36415 COLL VENOUS BLD VENIPUNCTURE: CPT

## 2018-06-18 PROCEDURE — 85610 PROTHROMBIN TIME: CPT

## 2018-06-21 DIAGNOSIS — Z79.01 LONG-TERM (CURRENT) USE OF ANTICOAGULANTS: Primary | ICD-10-CM

## 2018-06-21 LAB — INR PPP: 1.11 (ref 0.86–1.14)

## 2018-06-21 PROCEDURE — 85610 PROTHROMBIN TIME: CPT

## 2018-06-21 PROCEDURE — 36415 COLL VENOUS BLD VENIPUNCTURE: CPT

## 2018-06-25 DIAGNOSIS — Z79.01 LONG TERM CURRENT USE OF ANTICOAGULANT THERAPY: Primary | ICD-10-CM

## 2018-06-25 DIAGNOSIS — Z79.01 LONG TERM CURRENT USE OF ANTICOAGULANT THERAPY: ICD-10-CM

## 2018-06-25 LAB — INR PPP: 1.5 (ref 0.86–1.14)

## 2018-06-25 PROCEDURE — 85610 PROTHROMBIN TIME: CPT | Performed by: PHYSICIAN ASSISTANT

## 2018-06-25 PROCEDURE — 36416 COLLJ CAPILLARY BLOOD SPEC: CPT | Performed by: PHYSICIAN ASSISTANT

## 2018-06-28 DIAGNOSIS — Z79.01 LONG TERM CURRENT USE OF ANTICOAGULANT THERAPY: ICD-10-CM

## 2018-06-28 LAB — INR PPP: 1.39 (ref 0.86–1.14)

## 2018-06-28 PROCEDURE — 85610 PROTHROMBIN TIME: CPT | Performed by: PHYSICIAN ASSISTANT

## 2018-06-28 PROCEDURE — 36415 COLL VENOUS BLD VENIPUNCTURE: CPT | Performed by: PHYSICIAN ASSISTANT

## 2018-07-02 ENCOUNTER — MYC MEDICAL ADVICE (OUTPATIENT)
Dept: FAMILY MEDICINE | Facility: CLINIC | Age: 73
End: 2018-07-02

## 2018-07-02 DIAGNOSIS — Z79.01 LONG TERM CURRENT USE OF ANTICOAGULANT THERAPY: ICD-10-CM

## 2018-07-02 LAB — INR PPP: 1.92 (ref 0.86–1.14)

## 2018-07-02 PROCEDURE — 36415 COLL VENOUS BLD VENIPUNCTURE: CPT | Performed by: PHYSICIAN ASSISTANT

## 2018-07-02 PROCEDURE — 85610 PROTHROMBIN TIME: CPT | Performed by: PHYSICIAN ASSISTANT

## 2018-08-28 DIAGNOSIS — I10 HYPERTENSION GOAL BP (BLOOD PRESSURE) < 140/90: ICD-10-CM

## 2018-08-29 RX ORDER — METOPROLOL SUCCINATE 25 MG/1
TABLET, EXTENDED RELEASE ORAL
Qty: 90 TABLET | Refills: 0 | Status: SHIPPED | OUTPATIENT
Start: 2018-08-29 | End: 2018-11-20

## 2018-08-29 NOTE — TELEPHONE ENCOUNTER
"Prescription approved per [facility] Refill Protocol.  Due for physical in December.  Enid Shin RN    Requested Prescriptions   Signed Prescriptions Disp Refills     metoprolol succinate (TOPROL-XL) 25 MG 24 hr tablet 90 tablet 0     Sig: TAKE 1 TABLET(25 MG) BY MOUTH AT BEDTIME    Beta-Blockers Protocol Passed    8/28/2018  4:07 PM       Passed - Blood pressure under 140/90 in past 12 months    BP Readings from Last 3 Encounters:   12/15/17 98/56   09/07/17 122/70   12/13/16 116/72                Passed - Patient is age 6 or older       Passed - Recent (12 mo) or future (30 days) visit within the authorizing provider's specialty    Patient had office visit in the last 12 months or has a visit in the next 30 days with authorizing provider or within the authorizing provider's specialty.  See \"Patient Info\" tab in inbasket, or \"Choose Columns\" in Meds & Orders section of the refill encounter.              "

## 2018-09-03 ENCOUNTER — MYC MEDICAL ADVICE (OUTPATIENT)
Dept: FAMILY MEDICINE | Facility: CLINIC | Age: 73
End: 2018-09-03

## 2018-09-04 ENCOUNTER — OFFICE VISIT (OUTPATIENT)
Dept: OPHTHALMOLOGY | Facility: CLINIC | Age: 73
End: 2018-09-04
Payer: COMMERCIAL

## 2018-09-04 DIAGNOSIS — H10.13 ALLERGIC CONJUNCTIVITIS, BILATERAL: ICD-10-CM

## 2018-09-04 DIAGNOSIS — H40.003 GLAUCOMA SUSPECT OF BOTH EYES: Primary | ICD-10-CM

## 2018-09-04 DIAGNOSIS — H25.13 NUCLEAR SCLEROTIC CATARACT OF BOTH EYES: ICD-10-CM

## 2018-09-04 ASSESSMENT — SLIT LAMP EXAM - LIDS
COMMENTS: NORMAL
COMMENTS: NORMAL

## 2018-09-04 ASSESSMENT — REFRACTION_WEARINGRX
OD_SPHERE: +2.50
SPECS_TYPE: OTC
OS_SPHERE: +2.50

## 2018-09-04 ASSESSMENT — EXTERNAL EXAM - LEFT EYE: OS_EXAM: NORMAL

## 2018-09-04 ASSESSMENT — EXTERNAL EXAM - RIGHT EYE: OD_EXAM: NORMAL

## 2018-09-04 ASSESSMENT — VISUAL ACUITY
OD_SC: 20/20
OS_SC+: -1
METHOD: SNELLEN - LINEAR
OS_SC: 20/20

## 2018-09-04 ASSESSMENT — CONF VISUAL FIELD
OD_NORMAL: 1
OS_NORMAL: 1

## 2018-09-04 ASSESSMENT — TONOMETRY
OD_IOP_MMHG: 17
IOP_METHOD: APPLANATION
OS_IOP_MMHG: 20

## 2018-09-04 ASSESSMENT — PACHYMETRY
OD_CT(UM): 553
OS_CT(UM): 552

## 2018-09-04 ASSESSMENT — CUP TO DISC RATIO
OD_RATIO: 0.3
OS_RATIO: 0.5

## 2018-09-04 NOTE — PROGRESS NOTES
HPI  Harriet Lance is a 73 year old female here for comprehensive eye exam and glaucoma suspect testing.  Denies vision changes. Has had a few weeks of itching both eyes, no redness.     PMH:  Hypertension, seasonal allergies  POH:  Glasses for reading only, cataracts, glaucoma suspect, no surgery, no trauma  Oc Meds:  None (latan in the past at [facility] was taken off)  FH:  Denies any glaucoma, age related macular degeneration, or other known eye diseases       Assessment & Plan      (H40.003) Glaucoma suspect of both eyes  (primary encounter diagnosis)  Comment: based on cup to discontinue, was being followed at [facility] for glc since at least 2005, was on Travatan in past but was taken off in past -- K pachy: 553/552   Tmax: 20/22    HVF: Fluct in 2015, 2016     CDR: 0.3/0.5    HRT/OCT: RNFL WNL      FHX of Glc:  No    Gonio: open      Intolerant to:      Asthma/COPD: No, on po BB  Steroid Use: No    Kidney Stones: No    Sulfa Allergy:No      IOP targets:L20s -- IOP good  Plan: OCT Optic Nerve RNFL Spectralis OU (both eyes), normal both eyes         OVF 24-2 Dynamic each eye- fluctuations, non-specific changes only  Follow-up in one year    (H25.13) Nuclear sclerotic cataract of both eyes - Both Eyes  Comment: mild, not visually significant   Plan: follow     (H10.13) Allergic conjunctivitis, bilateral - Both Eyes  Comment: mild   Plan: discussed with patient: You may try Patanol (olpatidine),, Zatidor (Ketotifen), or Alaway (Ketotifen) eye drops, which are available over the counter.  Ask your pharmacist for availability of anti-allergy eye drops.  They may take several days to notice an effect.      -----------------------------------------------------------------------------------    Patient disposition:   Return in about 1 year (around 9/4/2019) for Comprehensive Exam- glaucoma suspect, RNFL ou and , Octopus 24-2. Call for sooner appointment as needed.    Complete documentation of historical and exam elements  from today's encounter can be found in the full encounter summary report (not reduplicated in this progress note). I personally obtained the chief complaint(s) and history of present illness.  I have confirmed and edited as necessary the CC, HPI, PMH/PSH, social history, FMH, ROS, and exam/neuro findings as obtained by the technician or others. I have examined this patient myself and I personally viewed the image(s) and studies listed above and the documentation reflects my findings and interpretation.  I formulated and edited as necessary the assessment and plan and discussed the findings and management plan with the patient and family.     Balbina Aguilar MD

## 2018-09-04 NOTE — MR AVS SNAPSHOT
After Visit Summary   9/4/2018    Harriet Lance    MRN: [id number]           Patient Information     Date Of Birth          1945        Visit Information        Provider Department      9/4/2018 9:20 AM Balbina Aguilar MD [facility]        Today's Diagnoses     Glaucoma suspect of both eyes - Both Eyes    -  1    Allergic conjunctivitis, bilateral - Both Eyes        Nuclear sclerotic cataract of both eyes - Both Eyes           Follow-ups after your visit        Follow-up notes from your care team     Return in about 1 year (around 9/4/2019) for Comprehensive Exam- glaucoma suspect, RNFL ou and , Octopus 24-2.      Who to contact     Please call your clinic at [phone] to:    Ask questions about your health    Make or cancel appointments    Discuss your medicines    Learn about your test results    Speak to your doctor            Additional Information About Your Visit        MyChart Information     [employer] gives you secure access to your electronic health record. If you see a primary care provider, you can also send messages to your care team and make appointments. If you have questions, please call your primary care clinic.  If you do not have a primary care provider, please call [phone] and they will assist you.      [employer] is an electronic gateway that provides easy, online access to your medical records. With [employer], you can request a clinic appointment, read your test results, renew a prescription or communicate with your care team.     To access your existing account, please contact your [facility] or call [phone] for assistance.        Care EveryWhere ID     This is your Care EveryWhere ID. This could be used by other organizations to access your [place] medical records  [id number]         Blood Pressure from Last 3 Encounters:   12/15/17 98/56   09/07/17 122/70   12/13/16 116/72    Weight from  Last 3 Encounters:   12/15/17 82.9 kg (182 lb 12.8 oz)   09/07/17 92.7 kg (204 lb 6.4 oz)   01/09/17 92.8 kg (204 lb 8 oz)              We Performed the Following     OCT Optic Nerve RNFL Spectralis OU (both eyes)     OVF 24-2 Dynamic OU          Today's Medication Changes          These changes are accurate as of 9/4/18 10:56 AM.  If you have any questions, ask your nurse or doctor.               Stop taking these medicines if you haven't already. Please contact your care team if you have questions.     acetaminophen 325 MG tablet   Commonly known as:  TYLENOL   Stopped by:  Balbina Aguilar MD           ALEVE PO   Stopped by:  Balbina Aguilar MD           calcium 600 MG tablet   Stopped by:  Balbina Aguilar MD           fluticasone 50 MCG/ACT spray   Commonly known as:  FLONASE   Stopped by:  Balbina Aguilar MD                    Primary Care Provider Office Phone # Fax #    Jolanta EMILY Wilkerson [phone] [address] [address]        Equal Access to Services     [facility]: Hadii aad ku hadasho Soomaali, waaxda luqadaha, qaybta kaalmada adeegyada, waxay idiin hayaan adeeg karen laalirio ah. So [facility] [phone].    ATENCIÓN: Si habla español, tiene a saba disposición servicios gratuitos de asistencia lingüística. Llame al [phone].    We comply with applicable federal civil rights laws and [place] laws. We do not discriminate on the basis of race, color, national origin, age, disability, sex, sexual orientation, or gender identity.            Thank you!     Thank you for choosing [facility]  for your care. Our goal is always to provide you with excellent care. Hearing back from our patients is one way we can continue to improve our services. Please take a few minutes to complete the written survey that you may receive in the mail after your visit with us. Thank you!             Your  Updated Medication List - Protect others around you: Learn how to safely use, store and throw away your medicines at www.disposemymeds.org.          This list is accurate as of 9/4/18 10:56 AM.  Always use your most recent med list.                   Brand Name Dispense Instructions for use Diagnosis    aspirin 81 MG tablet     100    1 tab po QD (Once per day)        Co Q10 100 MG Caps     30 capsule         fish oil-omega-3 fatty acids 1000 MG capsule      2 capsules once per day        hydrochlorothiazide 25 MG tablet    HYDRODIURIL    90 tablet    Take 1 tablet (25 mg) by mouth daily    Hypertension goal BP (blood pressure) < 140/90       metoprolol succinate 25 MG 24 hr tablet    TOPROL-XL    90 tablet    TAKE 1 TABLET(25 MG) BY MOUTH AT BEDTIME    Hypertension goal BP (blood pressure) < 140/90       multivitamin Tabs per tablet     30 tablet    Take 2 tablets by mouth daily        pravastatin 40 MG tablet    PRAVACHOL    90 tablet    Take 1 tablet (40 mg) by mouth daily    Hyperlipidemia with target LDL less than 130       vitamin D 2000 units tablet     30    Take 1 tablet by mouth daily.        ZYRTEC 10 MG tablet   Generic drug:  cetirizine     30    1 TABLET DAILY    Allergic rhinitis, cause unspecified

## 2018-09-05 ENCOUNTER — TELEPHONE (OUTPATIENT)
Dept: FAMILY MEDICINE | Facility: CLINIC | Age: 73
End: 2018-09-05

## 2018-09-05 NOTE — TELEPHONE ENCOUNTER
Reason for Call: pt reporting BP     Detailed comments: pt BP has been around 150/80 and she is wondering if she should be back to the higher dose of her meds.  Please call pt with advise    Phone Number Patient can be reached at: Home number on file [phone] (home)    Best Time: Any time    Can we leave a detailed message on this number? YES    Call taken on 9/5/2018 at 4:26 PM by Kristin Hope

## 2018-09-05 NOTE — TELEPHONE ENCOUNTER
Tried to call pt - no answer  Phone just continues to ring  Will recall tomorrow  Eileen JORDAN RN

## 2018-09-07 NOTE — TELEPHONE ENCOUNTER
Patient concerned about recent high B/P  See below was ~150/80 a couple days ago upon waking up    [facility]  Stress and sodium use discussed at   States she was eating more salt than usual, also having stress  [facility] provider wants pt to check B/P 3x/day for ~2 weeks and report results to PCP    Pt states PN recently changed B/P meds d/t 30 lb weight loss she had  Is on Metoprolol 25mg at HS, and HCTZ 25mg daily for HTN    B/P at HS yesterday 120/79  Asymptomatic  Will continue monitoring B/P and callback if needed  Advised she call if symptoms also  Pt agrees with plan  Eileen JORDAN RN

## 2018-09-10 ENCOUNTER — ALLIED HEALTH/NURSE VISIT (OUTPATIENT)
Dept: NURSING | Facility: CLINIC | Age: 73
End: 2018-09-10
Payer: COMMERCIAL

## 2018-09-10 DIAGNOSIS — Z23 NEED FOR ZOSTER VACCINE: Primary | ICD-10-CM

## 2018-09-10 DIAGNOSIS — Z23 NEED FOR PROPHYLACTIC VACCINATION AND INOCULATION AGAINST INFLUENZA: ICD-10-CM

## 2018-09-10 PROCEDURE — 90662 IIV NO PRSV INCREASED AG IM: CPT

## 2018-09-10 PROCEDURE — 90471 IMMUNIZATION ADMIN: CPT

## 2018-09-10 PROCEDURE — 99207 ZZC NO CHARGE NURSE ONLY: CPT

## 2018-09-10 PROCEDURE — 90750 HZV VACC RECOMBINANT IM: CPT

## 2018-09-10 PROCEDURE — 90472 IMMUNIZATION ADMIN EACH ADD: CPT

## 2018-11-20 DIAGNOSIS — I10 HYPERTENSION GOAL BP (BLOOD PRESSURE) < 140/90: ICD-10-CM

## 2018-11-21 RX ORDER — METOPROLOL SUCCINATE 25 MG/1
TABLET, EXTENDED RELEASE ORAL
Qty: 30 TABLET | Refills: 0 | Status: SHIPPED | OUTPATIENT
Start: 2018-11-21 | End: 2018-12-18

## 2018-11-21 NOTE — TELEPHONE ENCOUNTER
"Medication is being filled for 1 time refill only due to:  Patient needs to be seen because due for physical Dec 2018.   Eileen JORDAN RN    Requested Prescriptions   Pending Prescriptions Disp Refills     metoprolol succinate (TOPROL-XL) 25 MG 24 hr tablet [Pharmacy Med Name: METOPROLOL ER SUCCINATE 25MG TABS] 90 tablet 0     Sig: TAKE 1 TABLET(25 MG) BY MOUTH AT BEDTIME    Beta-Blockers Protocol Passed    11/20/2018  7:50 PM       Passed - Blood pressure under 140/90 in past 12 months    BP Readings from Last 3 Encounters:   12/15/17 98/56   09/07/17 122/70   12/13/16 116/72                Passed - Patient is age 6 or older       Passed - Recent (12 mo) or future (30 days) visit within the authorizing provider's specialty    Patient had office visit in the last 12 months or has a visit in the next 30 days with authorizing provider or within the authorizing provider's specialty.  See \"Patient Info\" tab in inbasket, or \"Choose Columns\" in Meds & Orders section of the refill encounter.                  "

## 2018-12-11 ENCOUNTER — TRANSFERRED RECORDS (OUTPATIENT)
Dept: HEALTH INFORMATION MANAGEMENT | Facility: CLINIC | Age: 73
End: 2018-12-11

## 2018-12-17 ENCOUNTER — TRANSFERRED RECORDS (OUTPATIENT)
Dept: HEALTH INFORMATION MANAGEMENT | Facility: CLINIC | Age: 73
End: 2018-12-17

## 2018-12-18 DIAGNOSIS — I10 HYPERTENSION GOAL BP (BLOOD PRESSURE) < 140/90: ICD-10-CM

## 2018-12-19 RX ORDER — METOPROLOL SUCCINATE 25 MG/1
TABLET, EXTENDED RELEASE ORAL
Qty: 30 TABLET | Refills: 0 | Status: SHIPPED | OUTPATIENT
Start: 2018-12-19 | End: 2019-01-09

## 2018-12-19 NOTE — TELEPHONE ENCOUNTER
"Medication is being filled for 1 time refill only due to:  upcoming appt   Eileen JORDAN RN    Requested Prescriptions   Pending Prescriptions Disp Refills     metoprolol succinate ER (TOPROL-XL) 25 MG 24 hr tablet [Pharmacy Med Name: METOPROLOL ER SUCCINATE 25MG TABS] 30 tablet 0     Sig: TAKE 1 TABLET BY MOUTH EVERY NIGHT AT BEDTIME    Beta-Blockers Protocol Failed - 12/18/2018  3:36 PM       Failed - Blood pressure under 140/90 in past 12 months    BP Readings from Last 3 Encounters:   12/15/17 98/56   09/07/17 122/70   12/13/16 116/72                Passed - Patient is age 6 or older       Passed - Recent (12 mo) or future (30 days) visit within the authorizing provider's specialty    Patient had office visit in the last 12 months or has a visit in the next 30 days with authorizing provider or within the authorizing provider's specialty.  See \"Patient Info\" tab in inbasket, or \"Choose Columns\" in Meds & Orders section of the refill encounter.              Next 5 appointments (look out 90 days)    Jan 09, 2019  8:15 AM CST  PHYSICAL with Jolanta Wilkerson DO  [facility] [address] [address] [phone]          "

## 2019-01-09 ENCOUNTER — OFFICE VISIT (OUTPATIENT)
Dept: FAMILY MEDICINE | Facility: CLINIC | Age: 74
End: 2019-01-09
Payer: COMMERCIAL

## 2019-01-09 ENCOUNTER — MYC MEDICAL ADVICE (OUTPATIENT)
Dept: FAMILY MEDICINE | Facility: CLINIC | Age: 74
End: 2019-01-09

## 2019-01-09 VITALS
WEIGHT: 185.06 LBS | BODY MASS INDEX: 31.28 KG/M2 | SYSTOLIC BLOOD PRESSURE: 126 MMHG | HEART RATE: 52 BPM | DIASTOLIC BLOOD PRESSURE: 64 MMHG | OXYGEN SATURATION: 99 % | TEMPERATURE: 98.5 F | RESPIRATION RATE: 14 BRPM

## 2019-01-09 DIAGNOSIS — Z79.899 OTHER LONG TERM (CURRENT) DRUG THERAPY: ICD-10-CM

## 2019-01-09 DIAGNOSIS — I10 HYPERTENSION GOAL BP (BLOOD PRESSURE) < 140/90: ICD-10-CM

## 2019-01-09 DIAGNOSIS — Z00.00 ENCOUNTER FOR ROUTINE ADULT HEALTH EXAMINATION WITHOUT ABNORMAL FINDINGS: Primary | ICD-10-CM

## 2019-01-09 DIAGNOSIS — E78.5 HYPERLIPIDEMIA WITH TARGET LDL LESS THAN 130: ICD-10-CM

## 2019-01-09 LAB
ALBUMIN SERPL-MCNC: 3.7 G/DL (ref 3.4–5)
ALP SERPL-CCNC: 93 U/L (ref 40–150)
ALT SERPL W P-5'-P-CCNC: 20 U/L (ref 0–50)
ANION GAP SERPL CALCULATED.3IONS-SCNC: 9 MMOL/L (ref 3–14)
AST SERPL W P-5'-P-CCNC: 11 U/L (ref 0–45)
BILIRUB SERPL-MCNC: 0.2 MG/DL (ref 0.2–1.3)
BUN SERPL-MCNC: 11 MG/DL (ref 7–30)
CALCIUM SERPL-MCNC: 9.5 MG/DL (ref 8.5–10.1)
CHLORIDE SERPL-SCNC: 107 MMOL/L (ref 94–109)
CHOLEST SERPL-MCNC: 156 MG/DL
CO2 SERPL-SCNC: 25 MMOL/L (ref 20–32)
CREAT SERPL-MCNC: 0.89 MG/DL (ref 0.52–1.04)
GFR SERPL CREATININE-BSD FRML MDRD: 64 ML/MIN/{1.73_M2}
GLUCOSE SERPL-MCNC: 104 MG/DL (ref 70–99)
HBA1C MFR BLD: 5.8 % (ref 0–5.6)
HDLC SERPL-MCNC: 52 MG/DL
LDLC SERPL CALC-MCNC: 73 MG/DL
NONHDLC SERPL-MCNC: 104 MG/DL
POTASSIUM SERPL-SCNC: 3.5 MMOL/L (ref 3.4–5.3)
PROT SERPL-MCNC: 8 G/DL (ref 6.8–8.8)
SODIUM SERPL-SCNC: 141 MMOL/L (ref 133–144)
TRIGL SERPL-MCNC: 157 MG/DL

## 2019-01-09 PROCEDURE — 80061 LIPID PANEL: CPT | Performed by: FAMILY MEDICINE

## 2019-01-09 PROCEDURE — 36415 COLL VENOUS BLD VENIPUNCTURE: CPT | Performed by: FAMILY MEDICINE

## 2019-01-09 PROCEDURE — 83036 HEMOGLOBIN GLYCOSYLATED A1C: CPT | Performed by: FAMILY MEDICINE

## 2019-01-09 PROCEDURE — G0438 PPPS, INITIAL VISIT: HCPCS | Performed by: FAMILY MEDICINE

## 2019-01-09 PROCEDURE — 80053 COMPREHEN METABOLIC PANEL: CPT | Performed by: FAMILY MEDICINE

## 2019-01-09 RX ORDER — PRAVASTATIN SODIUM 40 MG
TABLET ORAL
Qty: 90 TABLET | Refills: 3 | Status: SHIPPED | OUTPATIENT
Start: 2019-01-09 | End: 2020-01-21

## 2019-01-09 RX ORDER — METOPROLOL SUCCINATE 25 MG/1
25 TABLET, EXTENDED RELEASE ORAL AT BEDTIME
Qty: 90 TABLET | Refills: 3 | Status: SHIPPED | OUTPATIENT
Start: 2019-01-09 | End: 2019-03-12

## 2019-01-09 RX ORDER — HYDROCHLOROTHIAZIDE 25 MG/1
TABLET ORAL
Qty: 90 TABLET | Refills: 3 | Status: SHIPPED | OUTPATIENT
Start: 2019-01-09 | End: 2020-01-21

## 2019-01-09 RX ORDER — CHOLECALCIFEROL (VITAMIN D3) 50 MCG
1 TABLET ORAL DAILY
Qty: 30 TABLET | Refills: 0 | COMMUNITY
Start: 2019-01-09

## 2019-01-09 ASSESSMENT — ACTIVITIES OF DAILY LIVING (ADL): CURRENT_FUNCTION: NO ASSISTANCE NEEDED

## 2019-01-09 ASSESSMENT — PAIN SCALES - GENERAL: PAINLEVEL: NO PAIN (0)

## 2019-01-09 NOTE — NURSING NOTE
"Chief Complaint   Patient presents with     Medicare Visit     /64   Pulse 52   Temp 98.5  F (36.9  C) (Tympanic)   Resp 14   Wt 83.9 kg (185 lb 1 oz)   SpO2 99%   Breastfeeding? No   BMI 31.28 kg/m   Estimated body mass index is 31.28 kg/m  as calculated from the following:    Height as of 12/15/17: 1.638 m (5' 4.5\").    Weight as of this encounter: 83.9 kg (185 lb 1 oz).  bp completed using cuff size: regular      Health Maintenance addressed:  NONE    n/a              "

## 2019-01-09 NOTE — RESULT ENCOUNTER NOTE
Dear Harriet,   Your test results are all back -   Cholesterol looks stable.  Your glucose is up and the A1C which shows blood sugar (glucose) for the past 3 months is also borderline high.  This should improve by cutting out the cookies.  Keep working on healthy diet and exercise.  Other labs - kidney and liver tests are all normal.  Let us know if you have any questions.  -Jolanta Wilkerson, DO

## 2019-01-09 NOTE — PATIENT INSTRUCTIONS
PLEASE CALL TO SCHEDULE YOUR MAMMOGRAM  [facility] [phone]  [facility]   [phone]  Central Scheduling (all locations) [phone]      Preventive Health Recommendations    See your health care provider every year to    Review health changes.     Discuss preventive care.      Review your medicines if your doctor has prescribed any.      You no longer need a yearly Pap test unless you've had an abnormal Pap test in the past 10 years. If you have vaginal symptoms, such as bleeding or discharge, be sure to talk with your provider about a Pap test.      Every 1 to 2 years, have a mammogram.  If you are over 69, talk with your health care provider about whether or not you want to continue having screening mammograms.      Every 10 years, have a colonoscopy. Or, have a yearly FIT test (stool test). These exams will check for colon cancer.       Have a cholesterol test every 5 years, or more often if your doctor advises it.       Have a diabetes test (fasting glucose) every three years. If you are at risk for diabetes, you should have this test more often.       At age 65, have a bone density scan (DEXA) to check for osteoporosis (brittle bone disease).    Shots:    Get a flu shot each year.    Get a tetanus shot every 10 years.    Talk to your doctor about your pneumonia vaccines. There are now two you should receive - Pneumovax (PPSV 23) and Prevnar (PCV 13).    Talk to your pharmacist about the shingles vaccine.    Talk to your doctor about the hepatitis B vaccine.    Nutrition:     Eat at least 5 servings of fruits and vegetables each day.      Eat whole-grain bread, whole-wheat pasta and brown rice instead of white grains and rice.      Get adequate Calcium and Vitamin D.     Lifestyle    Exercise at least 150 minutes a week (30 minutes a day, 5 days a week). This will help you control your weight and prevent disease.      Limit alcohol to one drink per day.      No smoking.        Wear sunscreen to prevent skin cancer.       See your dentist twice a year for an exam and cleaning.      See your eye doctor every 1 to 2 years to screen for conditions such as glaucoma, macular degeneration and cataracts.    Personalized Prevention Plan  You are due for the preventive services outlined below.  Your care team is available to assist you in scheduling these services.  If you have already completed any of these items, please share that information with your care team to update in your medical record.  Health Maintenance Due   Topic Date Due     Discuss Advance Directive Planning  08/01/2017

## 2019-01-09 NOTE — PROGRESS NOTES
"SUBJECTIVE:   Harriet Lance is a 73 year old female who presents for Preventive Visit.  Are you in the first 12 months of your Medicare coverage?  No    Annual Wellness Visit     In general, how would you rate your overall health?  Good    Frequency of exercise:  1 day/week    Duration of exercise:  15-30 minutes    Do you usually eat at least 4 servings of fruit and vegetables a day, include whole grains    & fiber and avoid regularly eating high fat or \"junk\" foods?  No    Taking medications regularly:  Yes    Medication side effects:  Not applicable    Ability to successfully perform activities of daily living:  No assistance needed    Home Safety:  No safety concerns identified    Hearing Impairment:  Need to ask people to speak up or repeat themselves    In the past 6 months, have you been bothered by leaking of urine?  No    In general, how would you rate your overall mental or emotional health?  Excellent    PHQ-2 Total Score: 0    Additional concerns today:  No    Do you feel safe in your environment? Yes    Do you have a Health Care Directive? Yes: Advance Directive has been received and scanned.    Wears hearing aids    Fall risk  Fallen 2 or more times in the past year?: No  Any fall with injury in the past year?: No    Cognitive Screening   1) Repeat 3 items (Leader, Season, Table)    2) Clock draw: NORMAL  3) 3 item recall: Recalls 3 objects  Results: 3 items recalled: COGNITIVE IMPAIRMENT LESS LIKELY    Mini-CogTM Copyright CARTER Randhawa. Licensed by the author for use in [facility]; reprinted with permission (stefan@.[facility]). All rights reserved.      Do you have sleep apnea, excessive snoring or daytime drowsiness?: snoring a few times    Reviewed and updated as needed this visit by clinical staff  Tobacco  Allergies  Meds  Problems  Med Hx  Surg Hx  Fam Hx         Reviewed and updated as needed this visit by Provider  Tobacco  Allergies  Meds  Problems  Med Hx  Surg Hx  Fam Hx "        Social History     Tobacco Use     Smoking status: Never Smoker     Smokeless tobacco: Never Used   Substance Use Topics     Alcohol use: Yes     Alcohol/week: 0.0 oz     Comment: rarely       Alcohol Use 1/9/2019   If you drink alcohol do you typically have greater than 3 drinks per day OR greater than 7 drinks per week? Not Applicable           -------------------------------------    Current providers sharing in care for this patient include:   Patient Care Team:  Jolanta Wilkerson DO as PCP - General  Jolanta Wilkerson DO as PCP - Assigned PCP  Jane Gonzalez APRN CNP as Nurse Practitioner (Neurology)  Aamury Ledesma MD as MD (Family Medicine - Sports Medicine)  Balbina Aguilar MD as MD (Ophthalmology)    The following health maintenance items are reviewed in Epic and correct as of today:  Health Maintenance   Topic Date Due     ADVANCE DIRECTIVE PLANNING Q5 YRS  08/01/2017     MAMMO Q1 YR  02/07/2019     FALL RISK ASSESSMENT  09/04/2019     PHQ-2 Q1 YR  09/04/2019     COLONOSCOPY Q5 YR  10/02/2022     LIPID SCREEN Q5 YR FEMALE (SYSTEM ASSIGNED)  12/15/2022     DTAP/TDAP/TD IMMUNIZATION (4 - Td) 12/15/2027     DEXA SCAN SCREENING (SYSTEM ASSIGNED)  Completed     INFLUENZA VACCINE  Completed     ZOSTER IMMUNIZATION  Completed     HEPATITIS C SCREENING  Completed     IPV IMMUNIZATION  Aged Out     MENINGITIS IMMUNIZATION  Aged Out     Patient Active Problem List   Diagnosis     Allergic rhinitis     Diffuse cystic mastopathy     History of malignant neoplasm of large intestine     Enlarged lymph nodes     CARDIOVASCULAR SCREENING; LDL GOAL LESS THAN 100     Glaucoma     Hypertension goal BP (blood pressure) < 140/90     Advanced directives, counseling/discussion     Arthritis of hip     HL (hearing loss)     Hyperlipidemia with target LDL less than 130     BMI 37.0-37.9, adult     Benign neoplasm of colon     Past Surgical History:   Procedure Laterality Date     AS TOTAL  "HIP ARTHROPLASTY Right 2017    done at [facility]     C REMOVAL COLON/ILEOSTOMY      8 inches of colon removed - not clear what section       Social History     Tobacco Use     Smoking status: Never Smoker     Smokeless tobacco: Never Used   Substance Use Topics     Alcohol use: Yes     Alcohol/week: 0.0 oz     Comment: rarely     Family History   Problem Relation Age of Onset     Heart Disease Mother          of heart Attack at age 75     Hypertension Mother      Cancer Father         Prostate     Diabetes Father         Type II     Prostate Cancer Father      Allergies Father      Cancer - colorectal Maternal Grandmother         Colon Resection     Breast Cancer Maternal Grandmother         Mastectomy     Cancer - colorectal Maternal Uncle      Glaucoma No family hx of      Macular Degeneration No family hx of            Review of Systems  Constitutional, HEENT, cardiovascular, pulmonary, GI, , musculoskeletal, neuro, skin, endocrine and psych systems are negative, except as otherwise noted.    OBJECTIVE:   /64   Pulse 52   Temp 98.5  F (36.9  C) (Tympanic)   Resp 14   Wt 83.9 kg (185 lb 1 oz)   SpO2 99%   Breastfeeding? No   BMI 31.28 kg/m   Estimated body mass index is 31.28 kg/m  as calculated from the following:    Height as of 12/15/17: 1.638 m (5' 4.5\").    Weight as of this encounter: 83.9 kg (185 lb 1 oz).  Physical Exam  GENERAL APPEARANCE: alert, no distress and over weight  EYES: Eyes grossly normal to inspection, PERRL and conjunctivae and sclerae normal  HENT: ear canals and TM's normal, nose and mouth without ulcers or lesions, oropharynx clear and oral mucous membranes moist  NECK: no adenopathy, no asymmetry, masses, or scars and thyroid normal to palpation  RESP: lungs clear to auscultation - no rales, rhonchi or wheezes  BREAST: normal without masses, tenderness or nipple discharge and no palpable axillary masses or adenopathy  CV: regular rate and rhythm, normal S1 " "S2, no S3 or S4, no murmur, click or rub, no peripheral edema and peripheral pulses strong  ABDOMEN: soft, nontender, no hepatosplenomegaly, no masses and bowel sounds normal  MS: no musculoskeletal defects are noted and gait is age appropriate without ataxia  SKIN: no suspicious lesions or rashes  NEURO: Normal strength and tone, sensory exam grossly normal, mentation intact and speech normal  PSYCH: mentation appears normal and affect normal/bright    Diagnostic Test Results:  Pending labs    ASSESSMENT / PLAN:   1. Encounter for routine adult health examination without abnormal findings  Routine screening   Mammogram due in Feb 2019  Colonoscopy 2022 due to polyp in 2017    2. Hypertension goal BP (blood pressure) < 140/90  Refilled   stable  - hydrochlorothiazide (HYDRODIURIL) 25 MG tablet; TAKE 1 TABLET(25 MG) BY MOUTH DAILY  Dispense: 90 tablet; Refill: 3  - metoprolol succinate ER (TOPROL-XL) 25 MG 24 hr tablet; Take 1 tablet (25 mg) by mouth At Bedtime  Dispense: 90 tablet; Refill: 3  - Comprehensive metabolic panel (BMP + Alb, Alk Phos, ALT, AST, Total. Bili, TP)  - Hemoglobin A1c    3. Hyperlipidemia with target LDL less than 130  Refilled   Stable - will check labs  - pravastatin (PRAVACHOL) 40 MG tablet; TAKE 1 TABLET(40 MG) BY MOUTH DAILY  Dispense: 90 tablet; Refill: 3  - Lipid panel reflex to direct LDL Fasting  - Comprehensive metabolic panel (BMP + Alb, Alk Phos, ALT, AST, Total. Bili, TP)  - Hemoglobin A1c    4. Other long term (current) drug therapy      - Hemoglobin A1c    End of Life Planning:  Patient currently has an advanced directive: Yes.  Practitioner is supportive of decision.    COUNSELING:  Reviewed preventive health counseling, as reflected in patient instructions    BP Readings from Last 1 Encounters:   01/09/19 126/64     Estimated body mass index is 31.28 kg/m  as calculated from the following:    Height as of 12/15/17: 1.638 m (5' 4.5\").    Weight as of this encounter: 83.9 kg (185 " lb 1 oz).      Weight management plan: Discussed healthy diet and exercise guidelines     reports that  has never smoked. she has never used smokeless tobacco.      Appropriate preventive services were discussed with this patient, including applicable screening as appropriate for cardiovascular disease, diabetes, osteopenia/osteoporosis, and glaucoma.  As appropriate for age/gender, discussed screening for colorectal cancer, prostate cancer, breast cancer, and cervical cancer. Checklist reviewing preventive services available has been given to the patient.    Reviewed patients plan of care and provided an AVS. The Basic Care Plan (routine screening as documented in Health Maintenance) for [place] meets the Care Plan requirement. This Care Plan has been established and reviewed with the Patient.    Counseling Resources:  ATP IV Guidelines  Pooled Cohorts Equation Calculator  Breast Cancer Risk Calculator  FRAX Risk Assessment  ICSI Preventive Guidelines  Dietary Guidelines for Americans, 2010  USDA's MyPlate  ASA Prophylaxis  Lung CA Screening    Jolanta Wilkerson,   [facility]

## 2019-02-08 ENCOUNTER — ANCILLARY PROCEDURE (OUTPATIENT)
Dept: MAMMOGRAPHY | Facility: CLINIC | Age: 74
End: 2019-02-08

## 2019-02-08 DIAGNOSIS — Z12.31 ENCOUNTER FOR SCREENING MAMMOGRAM FOR BREAST CANCER: ICD-10-CM

## 2019-03-12 ENCOUNTER — MYC MEDICAL ADVICE (OUTPATIENT)
Dept: FAMILY MEDICINE | Facility: CLINIC | Age: 74
End: 2019-03-12

## 2019-03-12 DIAGNOSIS — I10 HYPERTENSION GOAL BP (BLOOD PRESSURE) < 140/90: ICD-10-CM

## 2019-03-12 RX ORDER — METOPROLOL SUCCINATE 50 MG/1
50 TABLET, EXTENDED RELEASE ORAL DAILY
Qty: 30 TABLET | Refills: 1 | Status: SHIPPED | OUTPATIENT
Start: 2019-03-12 | End: 2019-04-10

## 2019-03-12 NOTE — TELEPHONE ENCOUNTER
Pt calling back to follow up  On [employer] message below    Ph. [phone] until 12:00pm   VM is okay     [phone] After 12:00pm   NO VM

## 2019-03-12 NOTE — TELEPHONE ENCOUNTER
Lets have her increase her metoprolol from 25mg up to 50mg   Will fax a new Rx to her pharmacy for 50mg tablets  If she has 25mg - she can take two or set them aside and just start the 50mg tablet.    Will see her for follow-up  Thanks  PN

## 2019-03-12 NOTE — TELEPHONE ENCOUNTER
Tried to call pt at the after 12pm # below, no answer  Left VM at other phone # for pt to callback or check her MyChart  Sent MyChart to pt about below  Eileen JORDAN RN

## 2019-03-12 NOTE — TELEPHONE ENCOUNTER
PN,   See below MyChart - called pt to further discuss    States B/P is up   Thinks weight is a factor   States she lost 30-35 pounds  Has gained about 20-25 pounds back   Weight gain is since physical in January 2019  States at physical you left med doses the same but told pt to lose ~10 pounds  Instead she gained    Denies vision symptoms (double vision, seeing spots, etc)  Pt states she has piercing pain to head   Feels like someone it stabbing her with pen she said  Pain is intermittent  Feeling is on left side towards top of head - sometimes on the right side towards top of head    Pt wondering if you want to adjust any meds then see her in a couple weeks for f/u?  Please advise  Thanks,  Eileen JORDAN RN

## 2019-04-10 ENCOUNTER — OFFICE VISIT (OUTPATIENT)
Dept: FAMILY MEDICINE | Facility: CLINIC | Age: 74
End: 2019-04-10
Payer: COMMERCIAL

## 2019-04-10 VITALS
DIASTOLIC BLOOD PRESSURE: 78 MMHG | BODY MASS INDEX: 31.11 KG/M2 | OXYGEN SATURATION: 100 % | WEIGHT: 186.7 LBS | SYSTOLIC BLOOD PRESSURE: 116 MMHG | HEIGHT: 65 IN | HEART RATE: 50 BPM | TEMPERATURE: 97.6 F

## 2019-04-10 DIAGNOSIS — R73.09 ELEVATED GLUCOSE: ICD-10-CM

## 2019-04-10 DIAGNOSIS — G44.209 TENSION-TYPE HEADACHE, NOT INTRACTABLE, UNSPECIFIED CHRONICITY PATTERN: Primary | ICD-10-CM

## 2019-04-10 DIAGNOSIS — I10 HYPERTENSION GOAL BP (BLOOD PRESSURE) < 140/90: ICD-10-CM

## 2019-04-10 LAB
CRP SERPL-MCNC: <2.9 MG/L (ref 0–8)
ERYTHROCYTE [SEDIMENTATION RATE] IN BLOOD BY WESTERGREN METHOD: 17 MM/H (ref 0–30)
GLUCOSE SERPL-MCNC: 93 MG/DL (ref 70–99)
HBA1C MFR BLD: 5.9 % (ref 0–5.6)

## 2019-04-10 PROCEDURE — 85652 RBC SED RATE AUTOMATED: CPT | Performed by: FAMILY MEDICINE

## 2019-04-10 PROCEDURE — 36415 COLL VENOUS BLD VENIPUNCTURE: CPT | Performed by: FAMILY MEDICINE

## 2019-04-10 PROCEDURE — 82947 ASSAY GLUCOSE BLOOD QUANT: CPT | Performed by: FAMILY MEDICINE

## 2019-04-10 PROCEDURE — 83036 HEMOGLOBIN GLYCOSYLATED A1C: CPT | Performed by: FAMILY MEDICINE

## 2019-04-10 PROCEDURE — 99214 OFFICE O/P EST MOD 30 MIN: CPT | Performed by: FAMILY MEDICINE

## 2019-04-10 PROCEDURE — 86140 C-REACTIVE PROTEIN: CPT | Performed by: FAMILY MEDICINE

## 2019-04-10 RX ORDER — METOPROLOL SUCCINATE 50 MG/1
50 TABLET, EXTENDED RELEASE ORAL DAILY
Qty: 90 TABLET | Refills: 1 | Status: SHIPPED | OUTPATIENT
Start: 2019-04-10 | End: 2019-09-24

## 2019-04-10 ASSESSMENT — MIFFLIN-ST. JEOR: SCORE: 1344.81

## 2019-04-10 NOTE — NURSING NOTE
"Chief Complaint   Patient presents with     Hyperlipidemia     Hypertension     /78   Pulse 50   Temp 97.6  F (36.4  C) (Oral)   Ht 1.638 m (5' 4.5\")   Wt 84.7 kg (186 lb 11.2 oz)   SpO2 100%   BMI 31.55 kg/m   Estimated body mass index is 31.55 kg/m  as calculated from the following:    Height as of this encounter: 1.638 m (5' 4.5\").    Weight as of this encounter: 84.7 kg (186 lb 11.2 oz).  Medication Reconciliation: complete      Health Maintenance that is potentially due pending provider review:  NONE    n/a    TANMAY Gore  "

## 2019-04-10 NOTE — PROGRESS NOTES
"  SUBJECTIVE:   Harriet Lance is a 74 year old female who presents to clinic today for the following   health issues:      Hyperlipidemia Follow-Up      Rate your low fat/cholesterol diet?: good    Taking statin?  Yes, no muscle aches from statin    Other lipid medications/supplements?:  Fish oil/Omega 3, dose 1000mg without side effects    Hypertension Follow-up      Outpatient blood pressures are being checked at home.  Results are averaging 120s/70s.    Low Salt Diet: no added salt      Amount of exercise or physical activity: 2-3 days/week for an average of 15-30 minutes    Problems taking medications regularly: No    Medication side effects: none    Diet: regular (no restrictions)      Having \"headaches\" -   Start on the right parietal side and radiate back    Will get the HA when she has too much salt or sugar  Describes more as a pressure in the back of the skull.  Treats it with cold compresses  Once medication increased the HA started improving but still has the pressure.  See previous message - pt requested to go up on her BP dose    -------------------------------------    Additional history: as documented    Reviewed  and updated as needed this visit by clinical staff  Allergies  Meds         Reviewed and updated as needed this visit by Provider  Tobacco  Allergies  Meds  Problems  Med Hx  Surg Hx  Fam Hx         Patient Active Problem List   Diagnosis     Allergic rhinitis     Diffuse cystic mastopathy     History of malignant neoplasm of large intestine     Enlarged lymph nodes     CARDIOVASCULAR SCREENING; LDL GOAL LESS THAN 100     Glaucoma     Hypertension goal BP (blood pressure) < 140/90     Advanced directives, counseling/discussion     Arthritis of hip     HL (hearing loss)     Hyperlipidemia with target LDL less than 130     BMI 37.0-37.9, adult     Benign neoplasm of colon     Past Surgical History:   Procedure Laterality Date     AS TOTAL HIP ARTHROPLASTY Right 04/13/2017    done " "at [facility]     C REMOVAL COLON/ILEOSTOMY      8 inches of colon removed - not clear what section       Social History     Tobacco Use     Smoking status: Never Smoker     Smokeless tobacco: Never Used   Substance Use Topics     Alcohol use: Yes     Alcohol/week: 0.0 oz     Comment: rarely     Family History   Problem Relation Age of Onset     Heart Disease Mother          of heart Attack at age 75     Hypertension Mother      Cancer Father         Prostate     Diabetes Father         Type II     Prostate Cancer Father      Allergies Father      Cancer - colorectal Maternal Grandmother         Colon Resection     Breast Cancer Maternal Grandmother         Mastectomy     Cancer - colorectal Maternal Uncle      Glaucoma No family hx of      Macular Degeneration No family hx of            ROS:  Constitutional, HEENT, cardiovascular, pulmonary, GI, , musculoskeletal, neuro, skin, endocrine and psych systems are negative, except as otherwise noted.    OBJECTIVE:     /78   Pulse 50   Temp 97.6  F (36.4  C) (Oral)   Ht 1.638 m (5' 4.5\")   Wt 84.7 kg (186 lb 11.2 oz)   SpO2 100%   BMI 31.55 kg/m    Body mass index is 31.55 kg/m .  GENERAL: healthy, alert and no distress  RESP: lungs clear to auscultation - no rales, rhonchi or wheezes  CV: regular rate and rhythm, normal S1 S2, no S3 or S4, no murmur, click or rub, no peripheral edema and peripheral pulses strong    Diagnostic Test Results:  Results for orders placed or performed in visit on 04/10/19   Hemoglobin A1c   Result Value Ref Range    Hemoglobin A1C 5.9 (H) 0 - 5.6 %   Glucose   Result Value Ref Range    Glucose 93 70 - 99 mg/dL   ESR: Erythrocyte sedimentation rate   Result Value Ref Range    Sed Rate 17 0 - 30 mm/h   CRP, inflammation   Result Value Ref Range    CRP Inflammation <2.9 0.0 - 8.0 mg/L       ASSESSMENT/PLAN:       1. Hypertension goal BP (blood pressure) < 140/90  BP well controlled with toprol XL -   Helping and having less " HA's  Will refill med for now  - metoprolol succinate ER (TOPROL-XL) 50 MG 24 hr tablet; Take 1 tablet (50 mg) by mouth daily  Dispense: 90 tablet; Refill: 1    2. Tension-type headache, not intractable, unspecified chronicity pattern  Suspect due to the high BP but need to r/o temporal arteritis vs other  Will check inflammatory markers  - ESR: Erythrocyte sedimentation rate  - CRP, inflammation    3. Elevated glucose  Symptoms of high and low glucose  Will check A1C  And glucose today  - Hemoglobin A1c  - Glucose    Pt will call or RTC if symptoms worsen or do not improve.      Jolanta Wilkerson,   [facility]

## 2019-04-11 ENCOUNTER — MYC MEDICAL ADVICE (OUTPATIENT)
Dept: FAMILY MEDICINE | Facility: CLINIC | Age: 74
End: 2019-04-11

## 2019-04-12 NOTE — RESULT ENCOUNTER NOTE
Dear Harriet,   Your test results are all back -   -All of your labs are stable.  The blood sugars have been stable.  Let us know if you have any questions.  -Jolanta Wilkerson, DO

## 2019-06-06 ENCOUNTER — ANCILLARY PROCEDURE (OUTPATIENT)
Dept: GENERAL RADIOLOGY | Facility: CLINIC | Age: 74
End: 2019-06-06
Attending: PHYSICIAN ASSISTANT
Payer: COMMERCIAL

## 2019-06-06 ENCOUNTER — OFFICE VISIT (OUTPATIENT)
Dept: FAMILY MEDICINE | Facility: CLINIC | Age: 74
End: 2019-06-06
Payer: COMMERCIAL

## 2019-06-06 VITALS
SYSTOLIC BLOOD PRESSURE: 120 MMHG | TEMPERATURE: 98.5 F | DIASTOLIC BLOOD PRESSURE: 80 MMHG | OXYGEN SATURATION: 96 % | RESPIRATION RATE: 16 BRPM | HEIGHT: 65 IN | BODY MASS INDEX: 31.11 KG/M2 | HEART RATE: 56 BPM | WEIGHT: 186.7 LBS

## 2019-06-06 DIAGNOSIS — Z96.641 PRESENCE OF RIGHT ARTIFICIAL HIP JOINT: Primary | ICD-10-CM

## 2019-06-06 PROCEDURE — 99213 OFFICE O/P EST LOW 20 MIN: CPT | Performed by: PHYSICIAN ASSISTANT

## 2019-06-06 PROCEDURE — 73502 X-RAY EXAM HIP UNI 2-3 VIEWS: CPT | Mod: FY

## 2019-06-06 ASSESSMENT — MIFFLIN-ST. JEOR: SCORE: 1344.81

## 2019-06-06 NOTE — PROGRESS NOTES
"Subjective     Harriet Lance is a 74 year old female who presents to clinic today for the following health issues:    HPI   Chief Complaint   Patient presents with     Consult     Has outside order to have a Hip Xray done     73 y/o new to me female to have an xray done.  She has followed with [facility], and did have right hip replaced just about 1 year ago.  She has been doing well.  The xray is routine part of follow up.          BP Readings from Last 3 Encounters:   06/06/19 120/80   04/10/19 116/78   01/09/19 126/64    Wt Readings from Last 3 Encounters:   06/06/19 84.7 kg (186 lb 11.2 oz)   04/10/19 84.7 kg (186 lb 11.2 oz)   01/09/19 83.9 kg (185 lb 1 oz)                      Reviewed and updated as needed this visit by Provider         Review of Systems   ROS COMP: Constitutional, HEENT, cardiovascular, pulmonary, gi and gu systems are negative, except as otherwise noted.      Objective    /80   Pulse 56   Temp 98.5  F (36.9  C) (Oral)   Resp 16   Ht 1.638 m (5' 4.5\")   Wt 84.7 kg (186 lb 11.2 oz)   SpO2 96%   BMI 31.55 kg/m    Body mass index is 31.55 kg/m .  Physical Exam   GENERAL: alert and no distress  EYES: Eyes grossly normal to inspection  PSYCH: mentation appears normal, affect normal/bright    Diagnostic Test Results:  Labs reviewed in Epic  Results for orders placed or performed in visit on 06/06/19   XR Pelvis and Hip Right 1 View    Narrative    XR PELVIS AND HIP RIGHT 1 VIEW 6/6/2019 10:36 AM    COMPARISON: 7/9/2010    HISTORY: Arthroplasty.      Impression    IMPRESSION: Bilateral total hip arthroplasties. Hardware appears  intact. No fractures are seen.    KIRK MARTE MD           Assessment & Plan     1. Presence of right artificial hip joint    - XR Pelvis and Hip Right 1 View     BMI:   Estimated body mass index is 31.55 kg/m  as calculated from the following:    Height as of this encounter: 1.638 m (5' 4.5\").    Weight as of this encounter: 84.7 kg (186 lb 11.2 oz). "               No follow-ups on file.    George Zhao PA-C  [facility]

## 2019-06-06 NOTE — NURSING NOTE
"Chief Complaint   Patient presents with     Consult     Has outside order to have a Hip Xray done     /80   Pulse 56   Temp 98.5  F (36.9  C) (Oral)   Resp 16   Ht 1.638 m (5' 4.5\")   Wt 84.7 kg (186 lb 11.2 oz)   SpO2 96%   BMI 31.55 kg/m   Estimated body mass index is 31.55 kg/m  as calculated from the following:    Height as of this encounter: 1.638 m (5' 4.5\").    Weight as of this encounter: 84.7 kg (186 lb 11.2 oz).  Medication Reconciliation: complete        Health Maintenance Due Pending Provider Review:  DEXA due.    Pt will schedule DEXA appt sometime after her hip surgery.    Barb Archer MA  [facility]  [phone]  "

## 2019-06-07 NOTE — RESULT ENCOUNTER NOTE
Dear Harriet    Your test results are attached, feel free to contact me via [employer]     Everything looks good on your xray    Rony Zhao PA-C

## 2019-06-13 ENCOUNTER — TELEPHONE (OUTPATIENT)
Dept: FAMILY MEDICINE | Facility: CLINIC | Age: 74
End: 2019-06-13

## 2019-06-13 NOTE — TELEPHONE ENCOUNTER
Reason for Call:  Medication or medication refill:metoprolol succinate ER (TOPROL-XL) 50 MG 24 hr tablet    Do you use a [facility]?  Name of the pharmacy and phone number for the current request:      [facility] [id number] [place] [address] AT [address] [place] [address]      Name of the medication requested: metoprolol succinate ER (TOPROL-XL) 50 MG 24 hr tablet    Other request: Pt called wanting to know if her metoprolol succinate ER (TOPROL-XL) 50 MG 24 hr tablet should be increased. Please call pt to discuss.     Can we leave a detailed message on this number? YES    Phone number patient can be reached at: Home number on file [phone] (home)    Best Time: Anytime     Call taken on 6/13/2019 at 9:24 AM by Tlaia Rico

## 2019-06-13 NOTE — TELEPHONE ENCOUNTER
"Spoke with patient  States she woke up feeling dizzy, lightheaded  Had been having some furnace issues and though maybe is was carbon monoxide poisoning.    Called EMS.  BP when they arrived was \"180 something over 73 something\". Recheck 15 min later \"149/68\"  House checked out ok.    States she is thinking her BP med needs to be increased.  Informed patient high BP possibly from anxiety due to dizziness, EMS arrival.    Scheduled patient to see PN tomorrow at 9:30.\  Enid Shin RN      "

## 2019-06-13 NOTE — TELEPHONE ENCOUNTER
Nelly/[facility] called stating they received pt Xray report and she is requesting the images get pushed. RT hip pelvis performed on 06/06/2019    Talia Rico on 6/13/2019 at 12:45 PM

## 2019-06-14 ENCOUNTER — OFFICE VISIT (OUTPATIENT)
Dept: FAMILY MEDICINE | Facility: CLINIC | Age: 74
End: 2019-06-14
Payer: COMMERCIAL

## 2019-06-14 VITALS
WEIGHT: 190 LBS | HEART RATE: 52 BPM | DIASTOLIC BLOOD PRESSURE: 62 MMHG | TEMPERATURE: 98.5 F | RESPIRATION RATE: 15 BRPM | SYSTOLIC BLOOD PRESSURE: 123 MMHG | HEIGHT: 65 IN | OXYGEN SATURATION: 98 % | BODY MASS INDEX: 31.65 KG/M2

## 2019-06-14 DIAGNOSIS — I10 HYPERTENSION GOAL BP (BLOOD PRESSURE) < 140/90: Primary | ICD-10-CM

## 2019-06-14 DIAGNOSIS — R42 DIZZINESS: ICD-10-CM

## 2019-06-14 LAB
ALBUMIN SERPL-MCNC: 3.8 G/DL (ref 3.4–5)
ALP SERPL-CCNC: 82 U/L (ref 40–150)
ALT SERPL W P-5'-P-CCNC: 21 U/L (ref 0–50)
ANION GAP SERPL CALCULATED.3IONS-SCNC: 8 MMOL/L (ref 3–14)
AST SERPL W P-5'-P-CCNC: 18 U/L (ref 0–45)
BILIRUB SERPL-MCNC: 0.3 MG/DL (ref 0.2–1.3)
BUN SERPL-MCNC: 13 MG/DL (ref 7–30)
CALCIUM SERPL-MCNC: 9.1 MG/DL (ref 8.5–10.1)
CHLORIDE SERPL-SCNC: 108 MMOL/L (ref 94–109)
CO2 SERPL-SCNC: 26 MMOL/L (ref 20–32)
CREAT SERPL-MCNC: 0.98 MG/DL (ref 0.52–1.04)
GFR SERPL CREATININE-BSD FRML MDRD: 57 ML/MIN/{1.73_M2}
GLUCOSE SERPL-MCNC: 101 MG/DL (ref 70–99)
POTASSIUM SERPL-SCNC: 3.5 MMOL/L (ref 3.4–5.3)
PROT SERPL-MCNC: 7.9 G/DL (ref 6.8–8.8)
SODIUM SERPL-SCNC: 142 MMOL/L (ref 133–144)

## 2019-06-14 PROCEDURE — 80053 COMPREHEN METABOLIC PANEL: CPT | Performed by: FAMILY MEDICINE

## 2019-06-14 PROCEDURE — 99213 OFFICE O/P EST LOW 20 MIN: CPT | Performed by: FAMILY MEDICINE

## 2019-06-14 PROCEDURE — 36415 COLL VENOUS BLD VENIPUNCTURE: CPT | Performed by: FAMILY MEDICINE

## 2019-06-14 ASSESSMENT — MIFFLIN-ST. JEOR: SCORE: 1359.77

## 2019-06-14 NOTE — NURSING NOTE
"Chief Complaint   Patient presents with     RECHECK     BP follow up (please see RN Triage note)     /62   Pulse 52   Temp 98.5  F (36.9  C) (Oral)   Resp 15   Ht 1.638 m (5' 4.5\")   Wt 86.2 kg (190 lb)   SpO2 98%   BMI 32.11 kg/m   Estimated body mass index is 32.11 kg/m  as calculated from the following:    Height as of this encounter: 1.638 m (5' 4.5\").    Weight as of this encounter: 86.2 kg (190 lb).  Medication Reconciliation: complete        Health Maintenance Due Pending Provider Review:  NONE    n/a    Barb Archer MA  [facility]  [phone]  "

## 2019-09-24 DIAGNOSIS — I10 HYPERTENSION GOAL BP (BLOOD PRESSURE) < 140/90: ICD-10-CM

## 2019-09-25 RX ORDER — METOPROLOL SUCCINATE 50 MG/1
TABLET, EXTENDED RELEASE ORAL
Qty: 90 TABLET | Refills: 0 | Status: SHIPPED | OUTPATIENT
Start: 2019-09-25 | End: 2019-12-10

## 2019-09-25 NOTE — TELEPHONE ENCOUNTER
"Metoprolol Succinate ER 50MG   Last Written Prescription Date:  04/10/2019  Last Fill Quantity: 90,  # refills: 1   Last office visit: 6/14/2019 with prescribing provider:  Yes    Future Office Visit:      Requested Prescriptions   Pending Prescriptions Disp Refills     metoprolol succinate ER (TOPROL-XL) 50 MG 24 hr tablet [Pharmacy Med Name: METOPROLOL ER SUCCINATE 50MG TABS] 90 tablet 0     Sig: TAKE 1 TABLET(50 MG) BY MOUTH DAILY       Beta-Blockers Protocol Passed - 9/24/2019  3:22 PM        Passed - Blood pressure under 140/90 in past 12 months     BP Readings from Last 3 Encounters:   06/14/19 123/62   06/06/19 120/80   04/10/19 116/78                 Passed - Patient is age 6 or older        Passed - Recent (12 mo) or future (30 days) visit within the authorizing provider's specialty     Patient had office visit in the last 12 months or has a visit in the next 30 days with authorizing provider or within the authorizing provider's specialty.  See \"Patient Info\" tab in inbasket, or \"Choose Columns\" in Meds & Orders section of the refill encounter.              Passed - Medication is active on med list          "

## 2019-09-25 NOTE — TELEPHONE ENCOUNTER
Prescription approved per [facility] Refill Protocol.  Eileen JORDAN RN

## 2019-09-28 ENCOUNTER — HEALTH MAINTENANCE LETTER (OUTPATIENT)
Age: 74
End: 2019-09-28

## 2019-12-10 DIAGNOSIS — E78.5 HYPERLIPIDEMIA WITH TARGET LDL LESS THAN 130: ICD-10-CM

## 2019-12-10 DIAGNOSIS — I10 HYPERTENSION GOAL BP (BLOOD PRESSURE) < 140/90: ICD-10-CM

## 2019-12-11 RX ORDER — METOPROLOL SUCCINATE 50 MG/1
TABLET, EXTENDED RELEASE ORAL
Qty: 30 TABLET | Refills: 0 | Status: SHIPPED | OUTPATIENT
Start: 2019-12-11 | End: 2020-01-21

## 2019-12-11 RX ORDER — PRAVASTATIN SODIUM 40 MG
TABLET ORAL
Start: 2019-12-11

## 2019-12-11 NOTE — TELEPHONE ENCOUNTER
Metoprolol:  Routing refill request to provider for review/approval because:  Patient needs to be seen because:  Due for physical in January.  Enid Shin RN    Pravastatin:  Too soon.  Rx sent 1/9/19 for #90 with 1 refill.  Enid Shin RN

## 2019-12-11 NOTE — TELEPHONE ENCOUNTER
"Pravastatin 40MG  Last Written Prescription Date:  01/09/2019  Last Fill Quantity: 90,  # refills: 3   Last office visit: 6/14/2019 with prescribing provider:  Yes    Future Office Visit:   Next 5 appointments (look out 90 days)    Jan 21, 2020  9:30 AM CST  PHYSICAL with Jolanta Wilkerson DO  [facility] ([facility]) [address] [address]  [phone]         Metoprolol Succinate ER 50MG  Last Written Prescription Date:  09/25/2019  Last Fill Quantity: 90,  # refills: 0   Last office visit: 6/14/2019 with prescribing provider:  Yes    Future Office Visit:   Next 5 appointments (look out 90 days)    Jan 21, 2020  9:30 AM CST  PHYSICAL with Jolanta Wilkerson DO  [address] [address]  [phone]         Requested Prescriptions   Pending Prescriptions Disp Refills     metoprolol succinate ER (TOPROL-XL) 50 MG 24 hr tablet [Pharmacy Med Name: METOPROLOL ER SUCCINATE 50MG TABS] 90 tablet 0     Sig: TAKE 1 TABLET(50 MG) BY MOUTH DAILY       Beta-Blockers Protocol Passed - 12/10/2019  8:39 PM        Passed - Blood pressure under 140/90 in past 12 months     BP Readings from Last 3 Encounters:   06/14/19 123/62   06/06/19 120/80   04/10/19 116/78                 Passed - Patient is age 6 or older        Passed - Recent (12 mo) or future (30 days) visit within the authorizing provider's specialty     Patient has had an office visit with the authorizing provider or a provider within the authorizing providers department within the previous 12 mos or has a future within next 30 days. See \"Patient Info\" tab in inbasket, or \"Choose Columns\" in Meds & Orders section of the refill encounter.              Passed - Medication is active on med list        pravastatin (PRAVACHOL) 40 MG tablet [Pharmacy Med Name: PRAVASTATIN 40MG TABLETS] 90 tablet 0     Sig: TAKE 1 TABLET(40 MG) BY MOUTH DAILY       Statins " "Protocol Passed - 12/10/2019  8:39 PM        Passed - LDL on file in past 12 months     Recent Labs   Lab Test 01/09/19  0851   LDL 73             Passed - No abnormal creatine kinase in past 12 months     No lab results found.             Passed - Recent (12 mo) or future (30 days) visit within the authorizing provider's specialty     Patient has had an office visit with the authorizing provider or a provider within the authorizing providers department within the previous 12 mos or has a future within next 30 days. See \"Patient Info\" tab in inbasket, or \"Choose Columns\" in Meds & Orders section of the refill encounter.              Passed - Medication is active on med list        Passed - Patient is age 18 or older        Passed - No active pregnancy on record        Passed - No positive pregnancy test in past 12 months          "

## 2020-01-20 ASSESSMENT — ACTIVITIES OF DAILY LIVING (ADL): CURRENT_FUNCTION: NO ASSISTANCE NEEDED

## 2020-01-21 ENCOUNTER — OFFICE VISIT (OUTPATIENT)
Dept: FAMILY MEDICINE | Facility: CLINIC | Age: 75
End: 2020-01-21
Payer: COMMERCIAL

## 2020-01-21 VITALS
HEIGHT: 65 IN | HEART RATE: 48 BPM | SYSTOLIC BLOOD PRESSURE: 134 MMHG | BODY MASS INDEX: 31.87 KG/M2 | WEIGHT: 191.3 LBS | TEMPERATURE: 97.4 F | OXYGEN SATURATION: 98 % | DIASTOLIC BLOOD PRESSURE: 64 MMHG

## 2020-01-21 DIAGNOSIS — H53.9 VISION CHANGES: ICD-10-CM

## 2020-01-21 DIAGNOSIS — Z85.038 HISTORY OF MALIGNANT NEOPLASM OF LARGE INTESTINE: ICD-10-CM

## 2020-01-21 DIAGNOSIS — G50.0 TRIGEMINAL NEURALGIA: ICD-10-CM

## 2020-01-21 DIAGNOSIS — I10 HYPERTENSION GOAL BP (BLOOD PRESSURE) < 140/90: ICD-10-CM

## 2020-01-21 DIAGNOSIS — Z00.00 ENCOUNTER FOR MEDICARE ANNUAL WELLNESS EXAM: Primary | ICD-10-CM

## 2020-01-21 DIAGNOSIS — E78.5 HYPERLIPIDEMIA WITH TARGET LDL LESS THAN 130: ICD-10-CM

## 2020-01-21 LAB
ALBUMIN SERPL-MCNC: 3.6 G/DL (ref 3.4–5)
ALP SERPL-CCNC: 93 U/L (ref 40–150)
ALT SERPL W P-5'-P-CCNC: 19 U/L (ref 0–50)
ANION GAP SERPL CALCULATED.3IONS-SCNC: 2 MMOL/L (ref 3–14)
AST SERPL W P-5'-P-CCNC: 15 U/L (ref 0–45)
BILIRUB SERPL-MCNC: 0.3 MG/DL (ref 0.2–1.3)
BUN SERPL-MCNC: 12 MG/DL (ref 7–30)
CALCIUM SERPL-MCNC: 9 MG/DL (ref 8.5–10.1)
CHLORIDE SERPL-SCNC: 111 MMOL/L (ref 94–109)
CHOLEST SERPL-MCNC: 154 MG/DL
CO2 SERPL-SCNC: 27 MMOL/L (ref 20–32)
CREAT SERPL-MCNC: 0.84 MG/DL (ref 0.52–1.04)
ERYTHROCYTE [DISTWIDTH] IN BLOOD BY AUTOMATED COUNT: 14.4 % (ref 10–15)
GFR SERPL CREATININE-BSD FRML MDRD: 69 ML/MIN/{1.73_M2}
GLUCOSE SERPL-MCNC: 93 MG/DL (ref 70–99)
HCT VFR BLD AUTO: 42.9 % (ref 35–47)
HDLC SERPL-MCNC: 50 MG/DL
HGB BLD-MCNC: 14 G/DL (ref 11.7–15.7)
LDLC SERPL CALC-MCNC: 76 MG/DL
MCH RBC QN AUTO: 26.1 PG (ref 26.5–33)
MCHC RBC AUTO-ENTMCNC: 32.6 G/DL (ref 31.5–36.5)
MCV RBC AUTO: 80 FL (ref 78–100)
NONHDLC SERPL-MCNC: 104 MG/DL
PLATELET # BLD AUTO: 233 10E9/L (ref 150–450)
POTASSIUM SERPL-SCNC: 3.8 MMOL/L (ref 3.4–5.3)
PROT SERPL-MCNC: 7.9 G/DL (ref 6.8–8.8)
RBC # BLD AUTO: 5.36 10E12/L (ref 3.8–5.2)
SODIUM SERPL-SCNC: 140 MMOL/L (ref 133–144)
TRIGL SERPL-MCNC: 140 MG/DL
WBC # BLD AUTO: 6.6 10E9/L (ref 4–11)

## 2020-01-21 PROCEDURE — 36415 COLL VENOUS BLD VENIPUNCTURE: CPT | Performed by: FAMILY MEDICINE

## 2020-01-21 PROCEDURE — 80061 LIPID PANEL: CPT | Performed by: FAMILY MEDICINE

## 2020-01-21 PROCEDURE — 80053 COMPREHEN METABOLIC PANEL: CPT | Performed by: FAMILY MEDICINE

## 2020-01-21 PROCEDURE — 99397 PER PM REEVAL EST PAT 65+ YR: CPT | Performed by: FAMILY MEDICINE

## 2020-01-21 PROCEDURE — 85027 COMPLETE CBC AUTOMATED: CPT | Performed by: FAMILY MEDICINE

## 2020-01-21 RX ORDER — METOPROLOL SUCCINATE 50 MG/1
50 TABLET, EXTENDED RELEASE ORAL DAILY
Qty: 90 TABLET | Refills: 3 | Status: SHIPPED | OUTPATIENT
Start: 2020-01-21 | End: 2020-06-02

## 2020-01-21 RX ORDER — HYDROCHLOROTHIAZIDE 25 MG/1
TABLET ORAL
Qty: 90 TABLET | Refills: 3 | Status: SHIPPED | OUTPATIENT
Start: 2020-01-21 | End: 2021-02-15

## 2020-01-21 RX ORDER — PRAVASTATIN SODIUM 40 MG
TABLET ORAL
Qty: 90 TABLET | Refills: 3 | Status: SHIPPED | OUTPATIENT
Start: 2020-01-21 | End: 2021-01-25

## 2020-01-21 ASSESSMENT — ACTIVITIES OF DAILY LIVING (ADL): CURRENT_FUNCTION: NO ASSISTANCE NEEDED

## 2020-01-21 ASSESSMENT — MIFFLIN-ST. JEOR: SCORE: 1365.67

## 2020-01-21 NOTE — PROGRESS NOTES
"SUBJECTIVE:   Harriet Lance is a 74 year old female who presents for Preventive Visit.      Are you in the first 12 months of your Medicare coverage?  No    Healthy Habits:     In general, how would you rate your overall health?  Good    Frequency of exercise:  1 day/week    Duration of exercise:  Less than 15 minutes    Do you usually eat at least 4 servings of fruit and vegetables a day, include whole grains    & fiber and avoid regularly eating high fat or \"junk\" foods?  Yes    Taking medications regularly:  Yes    Medication side effects:  Not applicable    Ability to successfully perform activities of daily living:  No assistance needed    Home Safety:  Throw rugs in the hallway and no safety concerns identified    Hearing Impairment:  Difficult to understand a speaker at a public meeting or [employer] service    In the past 6 months, have you been bothered by leaking of urine?  No    In general, how would you rate your overall mental or emotional health?  Good      PHQ-2 Total Score: 0    Additional concerns today:  Yes    Do you feel safe in your environment? Yes    Have you ever done Advance Care Planning? (For example, a Health Directive, POLST, or a discussion with a medical provider or your loved ones about your wishes): No, advance care planning information given to patient to review.  Patient plans to discuss their wishes with loved ones or provider.        Fall risk  Fallen 2 or more times in the past year?: No  Any fall with injury in the past year?: No    Cognitive Screening   1) Repeat 3 items (Leader, Season, Table)    2) Clock draw: ABNORMAL   3) 3 item recall: Recalls 3 objects  Results: Normal clock, 1-2 items recalled:  Mini-CogTM Copyright CARTER Randhawa. Licensed by the author for use in [facility]; reprinted with permission (stefan@.[facility]). All rights reserved.      Do you have sleep apnea, excessive snoring or daytime drowsiness?: yes daytime drowsiness    Reviewed and updated as " needed this visit by clinical staff  Tobacco  Allergies  Meds  Problems  Med Hx  Surg Hx  Fam Hx         Reviewed and updated as needed this visit by Provider  Tobacco  Allergies  Meds  Problems  Med Hx  Surg Hx  Fam Hx        Social History     Tobacco Use     Smoking status: Never Smoker     Smokeless tobacco: Never Used   Substance Use Topics     Alcohol use: Never     Alcohol/week: 0.0 standard drinks     Comment: rarely     If you drink alcohol do you typically have >3 drinks per day or >7 drinks per week? No    No flowsheet data found.        -------------------------------------    Current providers sharing in care for this patient include:   Patient Care Team:  Jolanta Wilkerson DO as PCP - Jane Leyva APRN CNP as Nurse Practitioner (Neurology)  Amaury Ledesma MD as MD (Family Medicine - Sports Medicine)  Jolanta Wilkerson DO as Assigned PCP  Balbina Aguilar MD as MD (Ophthalmology)    The following health maintenance items are reviewed in Epic and correct as of today:  Health Maintenance   Topic Date Due     ADVANCE CARE PLANNING  08/01/2017     FALL RISK ASSESSMENT  01/09/2020     MEDICARE ANNUAL WELLNESS VISIT  01/09/2020     MAMMO SCREENING  02/08/2020     COLONOSCOPY  10/02/2022     LIPID  01/09/2024     DTAP/TDAP/TD IMMUNIZATION (4 - Td) 12/15/2027     DEXA  Completed     HEPATITIS C SCREENING  Completed     PHQ-2  Completed     INFLUENZA VACCINE  Completed     PNEUMOCOCCAL IMMUNIZATION 65+ LOW/MEDIUM RISK  Completed     ZOSTER IMMUNIZATION  Completed     IPV IMMUNIZATION  Aged Out     MENINGITIS IMMUNIZATION  Aged Out     Patient Active Problem List   Diagnosis     Allergic rhinitis     Diffuse cystic mastopathy     History of malignant neoplasm of large intestine     Enlarged lymph nodes     CARDIOVASCULAR SCREENING; LDL GOAL LESS THAN 100     Glaucoma     Hypertension goal BP (blood pressure) < 140/90     Advanced directives,  "counseling/discussion     Arthritis of hip     HL (hearing loss)     Hyperlipidemia with target LDL less than 130     BMI 37.0-37.9, adult     Benign neoplasm of colon     Past Surgical History:   Procedure Laterality Date     AS TOTAL HIP ARTHROPLASTY Right 2017    done at [facility]     C REMOVAL COLON/ILEOSTOMY      8 inches of colon removed - not clear what section       Social History     Tobacco Use     Smoking status: Never Smoker     Smokeless tobacco: Never Used   Substance Use Topics     Alcohol use: Never     Alcohol/week: 0.0 standard drinks     Comment: rarely     Family History   Problem Relation Age of Onset     Heart Disease Mother          of heart Attack at age 75     Hypertension Mother      Cancer Father         Prostate     Diabetes Father         Type II     Prostate Cancer Father      Allergies Father      Cancer - colorectal Maternal Grandmother         Colon Resection     Breast Cancer Maternal Grandmother         Mastectomy     Cancer - colorectal Maternal Uncle      Glaucoma No family hx of      Macular Degeneration No family hx of          Pneumonia Vaccine:UTD  Mammogram Screening: Mammogram Screening: Patient over age 50, mutual decision to screen reflected in health maintenance.    Review of Systems  Constitutional, HEENT, cardiovascular, pulmonary, GI, , musculoskeletal, neuro, skin, endocrine and psych systems are negative, except as otherwise noted.  Pt still having the pain in the parietal area of her head since last April -   Was seen at that time - unclear etiology but describes sharp stabbing pain    OBJECTIVE:   /64   Pulse (!) 48   Temp 97.4  F (36.3  C) (Oral)   Ht 1.638 m (5' 4.5\")   Wt 86.8 kg (191 lb 4.8 oz)   SpO2 98%   BMI 32.33 kg/m   Estimated body mass index is 32.33 kg/m  as calculated from the following:    Height as of this encounter: 1.638 m (5' 4.5\").    Weight as of this encounter: 86.8 kg (191 lb 4.8 oz).  Physical Exam  GENERAL " APPEARANCE: healthy, alert and no distress  EYES: Eyes grossly normal to inspection, PERRL and conjunctivae and sclerae normal  HENT: ear canals and TM's normal, nose and mouth without ulcers or lesions, oropharynx clear and oral mucous membranes moist  NECK: no adenopathy, no asymmetry, masses, or scars and thyroid normal to palpation  RESP: lungs clear to auscultation - no rales, rhonchi or wheezes  BREAST: normal without masses, tenderness or nipple discharge and no palpable axillary masses or adenopathy  CV: regular rate and rhythm, normal S1 S2, no S3 or S4, no murmur, click or rub, no peripheral edema and peripheral pulses strong  ABDOMEN: soft, nontender, no hepatosplenomegaly, no masses and bowel sounds normal  MS: no musculoskeletal defects are noted and gait is age appropriate without ataxia  SKIN: no suspicious lesions or rashes  NEURO: Normal strength and tone, sensory exam grossly normal, mentation intact and speech normal  PSYCH: mentation appears normal and affect normal/bright    Diagnostic Test Results:  Labs reviewed in Epic    ASSESSMENT / PLAN:   1. Encounter for Medicare annual wellness exam  Routine screening   Mammogram due Feb - number give  Colonoscopy next year  - Lipid panel reflex to direct LDL Fasting  - Comprehensive metabolic panel (BMP + Alb, Alk Phos, ALT, AST, Total. Bili, TP)  - CBC with platelets    2. Trigeminal neuralgia  Referral for evaluation and treatment recommendations -   Unclear if she needs any imaging - concern is hx of colon cancer - remote  - NEUROLOGY ADULT REFERRAL    3. History of malignant neoplasm of large intestine       4. Hyperlipidemia with target LDL less than 130  Lipids stable  - Lipid panel reflex to direct LDL Fasting  - Comprehensive metabolic panel (BMP + Alb, Alk Phos, ALT, AST, Total. Bili, TP)  - pravastatin (PRAVACHOL) 40 MG tablet; TAKE 1 TABLET(40 MG) BY MOUTH DAILY  Dispense: 90 tablet; Refill: 3    5. Hypertension goal BP (blood pressure) <  "140/90   BP recheck was good -refilled meds   - Comprehensive metabolic panel (BMP + Alb, Alk Phos, ALT, AST, Total. Bili, TP)  - hydrochlorothiazide (HYDRODIURIL) 25 MG tablet; TAKE 1 TABLET(25 MG) BY MOUTH DAILY  Dispense: 90 tablet; Refill: 3  - metoprolol succinate ER (TOPROL-XL) 50 MG 24 hr tablet; Take 1 tablet (50 mg) by mouth daily  Dispense: 90 tablet; Refill: 3    6. Vision changes  Referral for eye exam - concern she could have cataracts   - OPHTHALMOLOGY ADULT REFERRAL    COUNSELING:  Reviewed preventive health counseling, as reflected in patient instructions    Estimated body mass index is 32.33 kg/m  as calculated from the following:    Height as of this encounter: 1.638 m (5' 4.5\").    Weight as of this encounter: 86.8 kg (191 lb 4.8 oz).    Weight management plan: Discussed healthy diet and exercise guidelines     reports that she has never smoked. She has never used smokeless tobacco.      Appropriate preventive services were discussed with this patient, including applicable screening as appropriate for cardiovascular disease, diabetes, osteopenia/osteoporosis, and glaucoma.  As appropriate for age/gender, discussed screening for colorectal cancer, prostate cancer, breast cancer, and cervical cancer. Checklist reviewing preventive services available has been given to the patient.    Reviewed patients plan of care and provided an AVS. The Basic Care Plan (routine screening as documented in Health Maintenance) for [place] meets the Care Plan requirement. This Care Plan has been established and reviewed with the Patient.    Counseling Resources:  ATP IV Guidelines  Pooled Cohorts Equation Calculator  Breast Cancer Risk Calculator  FRAX Risk Assessment  ICSI Preventive Guidelines  Dietary Guidelines for Americans, 2010  USDA's MyPlate  ASA Prophylaxis  Lung CA Screening    Jolanta Wilkerson DO  [facility]    Identified Health Risks:  "

## 2020-01-21 NOTE — PATIENT INSTRUCTIONS
Patient Education   Personalized Prevention Plan  You are due for the preventive services outlined below.  Your care team is available to assist you in scheduling these services.  If you have already completed any of these items, please share that information with your care team to update in your medical record.  Health Maintenance Due   Topic Date Due     Discuss Advance Care Planning  08/01/2017     FALL RISK ASSESSMENT  01/09/2020     Annual Wellness Visit  01/09/2020     Mammogram  02/08/2020       PLEASE CALL TO SCHEDULE YOUR MAMMOGRAM  [facility] [phone]  [facility] [phone]  [facility]   [phone]  Central Scheduling (all locations) [phone]    If you are under/uninsured, we recommend you contact the [facility]. They offer mammograms on a sliding fee charge. You can schedule with them at [phone].

## 2020-01-23 ENCOUNTER — TELEPHONE (OUTPATIENT)
Dept: NEUROSURGERY | Facility: CLINIC | Age: 75
End: 2020-01-23

## 2020-01-23 NOTE — TELEPHONE ENCOUNTER
[facility] Call Center    Phone Message    May a detailed message be left on voicemail: yes    Reason for Call: Other: patient calling with a referral for Trigeminal neuralgia, please review and call to schedule thank you.      Action Taken: Message routed to:  [facility] neurosurgery

## 2020-01-27 ENCOUNTER — TELEPHONE (OUTPATIENT)
Dept: NEUROSURGERY | Facility: CLINIC | Age: 75
End: 2020-01-27

## 2020-01-27 NOTE — TELEPHONE ENCOUNTER
[facility] Call Center    Phone Message    May a detailed message be left on voicemail: yes    Reason for Call: Other:     Pt is calling in to schedule from a referral for Trigeminal neuralgia.   Please reach out to her to arrange.       Action Taken: Message routed to:  [facility] Neurosurgery

## 2020-01-29 ENCOUNTER — DOCUMENTATION ONLY (OUTPATIENT)
Dept: CARE COORDINATION | Facility: CLINIC | Age: 75
End: 2020-01-29

## 2020-01-30 ENCOUNTER — PRE VISIT (OUTPATIENT)
Dept: NEUROLOGY | Facility: CLINIC | Age: 75
End: 2020-01-30

## 2020-01-30 NOTE — TELEPHONE ENCOUNTER
FUTURE VISIT INFORMATION      FUTURE VISIT INFORMATION:    Date: 2/27/2020    Time: 10AM     Location: [facility]  REFERRAL INFORMATION:    Referring provider:  Jolanta JIMENEZ1/21/2020    Referring providers clinic:  [facility]     Reason for visit/diagnosis  Trigeminal Neuralgia     RECORDS REQUESTED FROM:       Clinic name Comments Records Status Imaging Status   [facility]  Various Dates  Care EVerywhere N/A

## 2020-02-03 DIAGNOSIS — E78.5 HYPERLIPIDEMIA WITH TARGET LDL LESS THAN 130: ICD-10-CM

## 2020-02-04 RX ORDER — PRAVASTATIN SODIUM 40 MG
TABLET ORAL
Start: 2020-02-04

## 2020-02-04 NOTE — TELEPHONE ENCOUNTER
"Pravastatin 40MG   Last Written Prescription Date:  1/21/2020  Last Fill Quantity: 90,  # refills: 3   Last office visit: 1/21/2020 with prescribing provider:  Yes    Future Office Visit:      Requested Prescriptions   Pending Prescriptions Disp Refills     pravastatin (PRAVACHOL) 40 MG tablet [Pharmacy Med Name: PRAVASTATIN 40MG TABLETS] 90 tablet 3     Sig: TAKE 1 TABLET(40 MG) BY MOUTH DAILY       Statins Protocol Passed - 2/3/2020  9:06 AM        Passed - LDL on file in past 12 months     Recent Labs   Lab Test 01/21/20  1018   LDL 76             Passed - No abnormal creatine kinase in past 12 months     No lab results found.             Passed - Recent (12 mo) or future (30 days) visit within the authorizing provider's specialty     Patient has had an office visit with the authorizing provider or a provider within the authorizing providers department within the previous 12 mos or has a future within next 30 days. See \"Patient Info\" tab in inbasket, or \"Choose Columns\" in Meds & Orders section of the refill encounter.              Passed - Medication is active on med list        Passed - Patient is age 18 or older        Passed - No active pregnancy on record        Passed - No positive pregnancy test in past 12 months          "

## 2020-02-10 ENCOUNTER — ANCILLARY PROCEDURE (OUTPATIENT)
Dept: MAMMOGRAPHY | Facility: CLINIC | Age: 75
End: 2020-02-10
Attending: FAMILY MEDICINE
Payer: COMMERCIAL

## 2020-02-10 DIAGNOSIS — Z12.31 VISIT FOR SCREENING MAMMOGRAM: ICD-10-CM

## 2020-02-12 ENCOUNTER — MYC MEDICAL ADVICE (OUTPATIENT)
Dept: FAMILY MEDICINE | Facility: CLINIC | Age: 75
End: 2020-02-12

## 2020-02-12 DIAGNOSIS — D22.9 ATYPICAL MOLE: Primary | ICD-10-CM

## 2020-02-17 ENCOUNTER — OFFICE VISIT (OUTPATIENT)
Dept: OPHTHALMOLOGY | Facility: CLINIC | Age: 75
End: 2020-02-17
Attending: FAMILY MEDICINE
Payer: COMMERCIAL

## 2020-02-17 DIAGNOSIS — H40.003 GLAUCOMA SUSPECT OF BOTH EYES: ICD-10-CM

## 2020-02-17 DIAGNOSIS — H01.003 BLEPHARITIS OF BOTH EYES, UNSPECIFIED EYELID, UNSPECIFIED TYPE: ICD-10-CM

## 2020-02-17 DIAGNOSIS — H52.4 PRESBYOPIA OF BOTH EYES: ICD-10-CM

## 2020-02-17 DIAGNOSIS — H25.13 NUCLEAR SCLEROTIC CATARACT OF BOTH EYES: ICD-10-CM

## 2020-02-17 DIAGNOSIS — H01.006 BLEPHARITIS OF BOTH EYES, UNSPECIFIED EYELID, UNSPECIFIED TYPE: ICD-10-CM

## 2020-02-17 DIAGNOSIS — H40.003 GLAUCOMA SUSPECT OF BOTH EYES: Primary | ICD-10-CM

## 2020-02-17 ASSESSMENT — REFRACTION_MANIFEST
OS_ADD: +2.50
OS_CYLINDER: SPHERE
OS_SPHERE: -0.25
OD_CYLINDER: SPHERE
OD_SPHERE: -0.25
OD_ADD: +2.50

## 2020-02-17 ASSESSMENT — VISUAL ACUITY
OS_SC: 20/25
OD_SC: 20/25
OS_SC+: -2
METHOD: SNELLEN - LINEAR
OD_SC+: -1

## 2020-02-17 ASSESSMENT — CUP TO DISC RATIO
OD_RATIO: 0.3
OS_RATIO: 0.5

## 2020-02-17 ASSESSMENT — TONOMETRY
IOP_METHOD: TONOPEN
OS_IOP_MMHG: 17
OD_IOP_MMHG: 14

## 2020-02-17 ASSESSMENT — PACHYMETRY
OS_CT(UM): 552
OD_CT(UM): 553

## 2020-02-17 ASSESSMENT — CONF VISUAL FIELD
OD_NORMAL: 1
OS_NORMAL: 1
METHOD: COUNTING FINGERS

## 2020-02-17 ASSESSMENT — EXTERNAL EXAM - RIGHT EYE: OD_EXAM: NORMAL

## 2020-02-17 ASSESSMENT — EXTERNAL EXAM - LEFT EYE: OS_EXAM: NORMAL

## 2020-02-17 NOTE — PROGRESS NOTES
HPI  Harriet Lance is a 74 year old female here for comprehensive eye exam and glaucoma suspect testing.  Mild blurry vision with night driving.  Wears over the counter readers, not ready for any distance prescription, still doing ok.  Minimal irritation.     PMH:  Hypertension, seasonal allergies  POH:  Glasses for reading only, cataracts, glaucoma suspect, no surgery, no trauma  Oc Meds:  None (latan in the past at [facility] was taken off)  FH:  Denies any glaucoma, age related macular degeneration, or other known eye diseases       Assessment & Plan      (H40.003) Glaucoma suspect of both eyes  (primary encounter diagnosis)  Comment: based on cup to discontinue, was being followed at [facility] for glc since at least 2005, was on Travatan in past but was taken off in past -- K pachy: 553/552   Tmax: 20/22    HVF: Fluct in 2015, 2016     CDR: 0.3/0.5    HRT/OCT: RNFL WNL      FHX of Glc:  No    Gonio: open      Intolerant to:      Asthma/COPD: No, on po BB  Steroid Use: No    Kidney Stones: No    Sulfa Allergy:No      IOP targets:L20s -- IOP good  Plan: OCT Optic Nerve RNFL Spectralis OU (both eyes), stable both eyes         OVF 24-2 Dynamic each eye- Non specific defects RIGHT eye> LEFT eye with less reliability high false +/- right eye and reliable left eye Follow-up in one year    (H25.13) Nuclear sclerotic cataract of both eyes - Both Eyes  Comment: mild, not visually significant   Plan: follow     (H52.4) Presbyopia of both eyes - Both Eyes  Comment: meeting needs with over the counter readers   Plan: manifest refraction done and prescription for glasses given on file if she would like prescription instead    (H01.003,  H01.006) Blepharitis - Both Eyes  Comment:  Mild  Plan:   Warm compresses daily as needed   Lid hygiene at bedtime as needed itching   Artificial tears 4-6 times per day as needed for discomfort    -----------------------------------------------------------------------------------    Patient  disposition:   Return in about 1 year (around 2/17/2021) for Comprehensive Exam, OCT nerve (RNFL) OU, [place] 24-2. Call for sooner appointment as needed.    Complete documentation of historical and exam elements from today's encounter can be found in the full encounter summary report (not reduplicated in this progress note). I personally obtained the chief complaint(s) and history of present illness.  I have confirmed and edited as necessary the CC, HPI, PMH/PSH, social history, FMH, ROS, and exam/neuro findings as obtained by the technician or others. I have examined this patient myself and I personally viewed the image(s) and studies listed above and the documentation reflects my findings and interpretation.  I formulated and edited as necessary the assessment and plan and discussed the findings and management plan with the patient and family.     Balbina Aguilar MD

## 2020-02-17 NOTE — NURSING NOTE
Chief Complaints and History of Present Illnesses   Patient presents with     Annual Eye Exam     Chief Complaint(s) and History of Present Illness(es)     Annual Eye Exam     Laterality: both eyes    Onset: years ago    Quality: blurred vision    Severity: mild    Frequency: constantly    Timing: throughout the day    Context: distance vision and driving    Course: stable    Treatments tried: no treatments    Pain scale: 0/10              Comments     Vision a little blurry at distance for a while. Patient denies eye pain or irritation. Does not use any eye drops.    Cierra Mancilla COT 9:43 AM February 17, 2020
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
## 2020-02-27 ENCOUNTER — OFFICE VISIT (OUTPATIENT)
Dept: NEUROLOGY | Facility: CLINIC | Age: 75
End: 2020-02-27
Attending: FAMILY MEDICINE
Payer: COMMERCIAL

## 2020-02-27 VITALS
HEART RATE: 54 BPM | BODY MASS INDEX: 32.28 KG/M2 | DIASTOLIC BLOOD PRESSURE: 60 MMHG | WEIGHT: 191 LBS | OXYGEN SATURATION: 98 % | SYSTOLIC BLOOD PRESSURE: 171 MMHG

## 2020-02-27 DIAGNOSIS — M79.2 NEURALGIA: Primary | ICD-10-CM

## 2020-02-27 RX ORDER — LORAZEPAM 1 MG/1
1 TABLET ORAL EVERY 6 HOURS PRN
Qty: 1 TABLET | Refills: 0 | Status: SHIPPED | OUTPATIENT
Start: 2020-02-27 | End: 2020-10-21

## 2020-02-27 ASSESSMENT — PAIN SCALES - GENERAL: PAINLEVEL: NO PAIN (0)

## 2020-02-27 NOTE — PATIENT INSTRUCTIONS
I believe you have muscle tightness in the neck which at times may lead to a nerve on the back of your neck (occipital nerve) to be irritated and give you a nerve symptom called (neuralgia).    We need to obtain imaging to make sure there is no issue with your brain or spinal cord of the neck causing your symptoms.    I will order some massage therapy for your neck tightness.
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
## 2020-02-27 NOTE — NURSING NOTE
Chief Complaint   Patient presents with     Consult     [facility] NEW - TRIGEMINAL NEURALGIA      Mahogany Gonzalez, EMT

## 2020-03-23 ENCOUNTER — MYC MEDICAL ADVICE (OUTPATIENT)
Dept: DERMATOLOGY | Facility: CLINIC | Age: 75
End: 2020-03-23

## 2020-03-23 NOTE — TELEPHONE ENCOUNTER
Left a voicemail for Harriet asking to call back to discuss his Dermatology appointment and MyChart sent    Due to the COVID-19 virus we are minimizing any non-urgent or non life threatening appointments in the clinic. We would like to turn your current appointment into a telephone visit. We will call you at your scheduled appointment time and you will speak with Dr. Pang.

## 2020-04-29 ENCOUNTER — MYC MEDICAL ADVICE (OUTPATIENT)
Dept: FAMILY MEDICINE | Facility: CLINIC | Age: 75
End: 2020-04-29

## 2020-04-29 ENCOUNTER — VIRTUAL VISIT (OUTPATIENT)
Dept: FAMILY MEDICINE | Facility: CLINIC | Age: 75
End: 2020-04-29
Payer: COMMERCIAL

## 2020-04-29 DIAGNOSIS — I10 HYPERTENSION GOAL BP (BLOOD PRESSURE) < 140/90: ICD-10-CM

## 2020-04-29 PROCEDURE — 99213 OFFICE O/P EST LOW 20 MIN: CPT | Mod: 95 | Performed by: FAMILY MEDICINE

## 2020-04-29 RX ORDER — METOPROLOL SUCCINATE 50 MG/1
50 TABLET, EXTENDED RELEASE ORAL DAILY
Qty: 90 TABLET | Refills: 3 | Status: CANCELLED | OUTPATIENT
Start: 2020-04-29

## 2020-04-29 NOTE — PROGRESS NOTES
"Harriet Lance is a 75 year old female who is being evaluated via a billable telephone visit.      The patient has been notified of following:     \"This telephone visit will be conducted via a call between you and your physician/provider. We have found that certain health care needs can be provided without the need for a physical exam.  This service lets us provide the care you need with a short phone conversation.  If a prescription is necessary we can send it directly to your pharmacy.  If lab work is needed we can place an order for that and you can then stop by our lab to have the test done at a later time.    Telephone visits are billed at different rates depending on your insurance coverage. During this emergency period, for some insurers they may be billed the same as an in-person visit.  Please reach out to your insurance provider with any questions.    If during the course of the call the physician/provider feels a telephone visit is not appropriate, you will not be charged for this service.\"    Patient has given verbal consent for Telephone visit?  Yes    How would you like to obtain your AVS? Johanat    Subjective     Harriet Lance is a 75 year old female who presents to clinic today for the following health issues:    HPI    Telephone visit.(pt. prefers). Re:BP running high. See [employer] message from today, recent BP's in message.      Hypertension Follow-up- now that she is staying at home all day and not so much physical activities outside and stressed out about the Covid 19 pandemic , she has noticed BP running higher , she used to be in the 130s over 70s and now it is in the 142 to 150 once to 70s and 80s diastolic , she denies any headaches no SOB no other symptoms, she is on hydrochlorothiazide 25 mg and metoprolol XR 50 mg daily dose , she used to be on the 75 mg in the past and then she started to lose weight over a yr ago and they went down to 50mg w, she has gained some weight now , " thinks she needs to go up to the 75 mg of this again , she is not checking her pulse .  Her BP are in the 140s and one n 150s but pulse seems to be 50 or 51 on the current dose of the metoprolol     Do you check your blood pressure regularly outside of the clinic? Yes     Are you following a low salt diet? No    Are your blood pressures ever more than 140 on the top number (systolic) OR more   than 90 on the bottom number (diastolic), for example 140/90? Yes      Patient Active Problem List   Diagnosis     Allergic rhinitis     Diffuse cystic mastopathy     History of malignant neoplasm of large intestine     Enlarged lymph nodes     CARDIOVASCULAR SCREENING; LDL GOAL LESS THAN 100     Borderline glaucoma (glaucoma suspect)     Hypertension goal BP (blood pressure) < 140/90     Advanced directives, counseling/discussion     Arthritis of hip     HL (hearing loss)     Hyperlipidemia with target LDL less than 130     BMI 37.0-37.9, adult     Benign neoplasm of colon     Past Surgical History:   Procedure Laterality Date     AS TOTAL HIP ARTHROPLASTY Right 2017    done at [facility]     C REMOVAL COLON/ILEOSTOMY      8 inches of colon removed - not clear what section       Social History     Tobacco Use     Smoking status: Never Smoker     Smokeless tobacco: Never Used   Substance Use Topics     Alcohol use: Never     Alcohol/week: 0.0 standard drinks     Comment: rarely     Family History   Problem Relation Age of Onset     Heart Disease Mother          of heart Attack at age 75     Hypertension Mother      Cancer Father         Prostate     Diabetes Father         Type II     Prostate Cancer Father      Allergies Father      Cancer - colorectal Maternal Grandmother         Colon Resection     Breast Cancer Maternal Grandmother         Mastectomy     Cancer - colorectal Maternal Uncle      Glaucoma No family hx of      Macular Degeneration No family hx of          Current Outpatient Medications    Medication Sig Dispense Refill     ASPIRIN 81 MG OR TABS 1 tab po QD (Once per day) 100 3     Coenzyme Q10 (CO Q10) 100 MG CAPS  30 capsule      FISH OIL 1000 MG OR CAPS 2 capsules once per day  0     hydrochlorothiazide (HYDRODIURIL) 25 MG tablet TAKE 1 TABLET(25 MG) BY MOUTH DAILY 90 tablet 3     LORazepam (ATIVAN) 1 MG tablet Take 1 tablet (1 mg) by mouth every 6 hours as needed for anxiety 1 tablet 0     metoprolol succinate ER (TOPROL-XL) 50 MG 24 hr tablet Take 1 tablet (50 mg) by mouth daily 90 tablet 3     Multiple Vitamins-Minerals (I-DARBY) TABS Take 2 tablets by mouth daily 30 tablet 0     pravastatin (PRAVACHOL) 40 MG tablet TAKE 1 TABLET(40 MG) BY MOUTH DAILY 90 tablet 3     vitamin D3 (CHOLECALCIFEROL) 2000 units tablet Take 1 tablet by mouth daily 30 tablet 0     ZYRTEC 10 MG OR TABS 1 TABLET DAILY 30 11     Allergies   Allergen Reactions     Hay Fever & [A.R.M.]      Amoxicillin Rash     Onset of raised red rash on torso and arms. Face blotchy and throat scratchy.  Noted on 10/4/16     Codeine Rash     Unclear if this is a reaction to amoxicillin or codeine.     Recent Labs   Lab Test 01/21/20  1018 06/14/19  1002 04/10/19  1011 01/09/19  0851  12/15/17  0951  04/21/15  1427  11/21/12  0832   A1C  --   --  5.9* 5.8*  --   --   --   --   --   --    LDL 76  --   --  73  --  67   < >  --    < > 147*   HDL 50  --   --  52  --  51   < >  --    < > 44*   TRIG 140  --   --  157*  --  125   < >  --    < > 248*   ALT 19 21  --  20  --  20   < >  --    < > 31   CR 0.84 0.98  --  0.89   < > 0.89   < > 0.98   < > 0.86   GFRESTIMATED 69 57*  --  64   < > 62   < > 56*   < > 66   GFRESTBLACK 80 66  --  75   < > 75   < > 68   < > 80   POTASSIUM 3.8 3.5  --  3.5   < > 3.1*   < > 3.6   < > 3.7   TSH  --   --   --   --   --   --   --  1.68  --  2.47    < > = values in this interval not displayed.      BP Readings from Last 3 Encounters:   02/27/20 (!) 171/60   01/21/20 134/64   06/14/19 123/62    Wt Readings from Last  3 Encounters:   02/27/20 86.6 kg (191 lb)   01/21/20 86.8 kg (191 lb 4.8 oz)   06/14/19 86.2 kg (190 lb)                    Reviewed and updated as needed this visit by Provider         Review of Systems   ROS COMP: Constitutional, HEENT, cardiovascular, pulmonary, GI, , musculoskeletal, neuro, skin, endocrine and psych systems are negative, except as otherwise noted.       Objective   Reported vitals:  There were no vitals taken for this visit.   healthy, alert and no distress  PSYCH: Alert and oriented times 3; coherent speech, normal   rate and volume, able to articulate logical thoughts, able   to abstract reason, no tangential thoughts, no hallucinations   or delusions  Her affect is normal  RESP: No cough, no audible wheezing, able to talk in full sentences  Remainder of exam unable to be completed due to telephone visits    Diagnostic Test Results:  Labs reviewed in Epic  none         Assessment/Plan:  1. Hypertension goal BP (blood pressure) < 140/90  We discussed that her BP are ok for most of te time and I would not be willing to increase the dose of the metoprolol as her pulse is 50s and she has had dizzy spells when she was on the 75 mg in the past , so increasing the dose of the metoprolol with this pulse can give her dizziness and even a risk of passing out, but if the BP is consistently over 150 systolic , we could think about adding lisinopril or losartan   Would need to f/u in a week on the pulse and BP, also discussed increasing the amount of walking she does to help with the BP .  Pt is aware  and comfortable with the current plan.      Phone call duration:  15 minutes    Jessie Rordiguez MD

## 2020-06-02 ENCOUNTER — VIRTUAL VISIT (OUTPATIENT)
Dept: FAMILY MEDICINE | Facility: CLINIC | Age: 75
End: 2020-06-02
Payer: COMMERCIAL

## 2020-06-02 DIAGNOSIS — I10 HYPERTENSION GOAL BP (BLOOD PRESSURE) < 140/90: ICD-10-CM

## 2020-06-02 PROCEDURE — 99214 OFFICE O/P EST MOD 30 MIN: CPT | Mod: 95 | Performed by: FAMILY MEDICINE

## 2020-06-02 RX ORDER — METOPROLOL SUCCINATE 50 MG/1
TABLET, EXTENDED RELEASE ORAL
Qty: 135 TABLET | Refills: 0 | Status: SHIPPED | OUTPATIENT
Start: 2020-06-02 | End: 2020-07-14

## 2020-06-02 NOTE — PROGRESS NOTES
"Harriet Lance is a 75 year old female who is being evaluated via a billable telephone visit.      The patient has been notified of following:     \"This telephone visit will be conducted via a call between you and your physician/provider. We have found that certain health care needs can be provided without the need for a physical exam.  This service lets us provide the care you need with a short phone conversation.  If a prescription is necessary we can send it directly to your pharmacy.  If lab work is needed we can place an order for that and you can then stop by our lab to have the test done at a later time.    Telephone visits are billed at different rates depending on your insurance coverage. During this emergency period, for some insurers they may be billed the same as an in-person visit.  Please reach out to your insurance provider with any questions.    If during the course of the call the physician/provider feels a telephone visit is not appropriate, you will not be charged for this service.\"    Patient has given verbal consent for Telephone visit?  Yes    What phone number would you like to be contacted at? [phone]    How would you like to obtain your AVS? Yvan    Subjective     Harriet Lance is a 75 year old female who presents via phone visit today for the following health issues:    HPI  Hypertension Follow-up      Do you check your blood pressure regularly outside of the clinic? Yes     Are you following a low salt diet? No    Are your blood pressures ever more than 140 on the top number (systolic) OR more   than 90 on the bottom number (diastolic), for example 140/90? Yes      How many servings of fruits and vegetables do you eat daily?  4 or more    On average, how many sweetened beverages do you drink each day (Examples: soda, juice, sweet tea, etc.  Do NOT count diet or artificially sweetened beverages)?   0    How many days per week do you exercise enough to make your heart beat " faster? 3 or less    How many minutes a day do you exercise enough to make your heart beat faster? 9 or less    How many days per week do you miss taking your medication? 0     took blood pressure at home and was 249/105  Took second a few hours later and 249/74- pulse was 58    Had patient check blood pressure while talking to her -   169/85 pulse 73  Wonders if dehydrated and overheated    -------------------------------------    Patient Active Problem List   Diagnosis     Allergic rhinitis     Diffuse cystic mastopathy     History of malignant neoplasm of large intestine     Enlarged lymph nodes     CARDIOVASCULAR SCREENING; LDL GOAL LESS THAN 100     Borderline glaucoma (glaucoma suspect)     Hypertension goal BP (blood pressure) < 140/90     Advanced directives, counseling/discussion     Arthritis of hip     HL (hearing loss)     Hyperlipidemia with target LDL less than 130     BMI 37.0-37.9, adult     Benign neoplasm of colon     Past Surgical History:   Procedure Laterality Date     AS TOTAL HIP ARTHROPLASTY Right 2017    done at [facility]     C REMOVAL COLON/ILEOSTOMY      8 inches of colon removed - not clear what section       Social History     Tobacco Use     Smoking status: Never Smoker     Smokeless tobacco: Never Used   Substance Use Topics     Alcohol use: Never     Alcohol/week: 0.0 standard drinks     Comment: rarely     Family History   Problem Relation Age of Onset     Heart Disease Mother          of heart Attack at age 75     Hypertension Mother      Cancer Father         Prostate     Diabetes Father         Type II     Prostate Cancer Father      Allergies Father      Cancer - colorectal Maternal Grandmother         Colon Resection     Breast Cancer Maternal Grandmother         Mastectomy     Cancer - colorectal Maternal Uncle      Glaucoma No family hx of      Macular Degeneration No family hx of            Reviewed and updated as needed this visit by Provider         Review  of Systems   Constitutional, HEENT, cardiovascular, pulmonary, GI, , musculoskeletal, neuro, skin, endocrine and psych systems are negative, except as otherwise noted.       Objective   Reported vitals:  There were no vitals taken for this visit.   healthy, alert and no distress  PSYCH: Alert and oriented times 3; coherent speech, normal   rate and volume, able to articulate logical thoughts, able   to abstract reason, no tangential thoughts, no hallucinations   or delusions  Her affect is normal  RESP: No cough, no audible wheezing, able to talk in full sentences  Remainder of exam unable to be completed due to telephone visits    Diagnostic Test Results:  Labs reviewed in Epic        Assessment/Plan:  1. Hypertension goal BP (blood pressure) < 140/90  Hypertensive urgency -   Recheck while she was on the phone with me was much better - in the 160's systolic but able to manage as an outpatient -   Pt did have chinese takeout this weekend - encouraged to cut out sodium   Will have her dose of metoprolol increased from 50mg at bedtime to 25mg AM and 50 at bedtime -   She will call next week with BP and pulse readings  Suspect has to do with protests/quarantine and COVID compounded  - metoprolol succinate ER (TOPROL-XL) 50 MG 24 hr tablet; Take 25mg (1/2 tablet) in the AM and 50mg in the PM  Dispense: 135 tablet; Refill: 0    No follow-ups on file.      Phone call duration:  14 minutes    Jolanta Wilkerson, DO

## 2020-06-08 ENCOUNTER — MYC MEDICAL ADVICE (OUTPATIENT)
Dept: FAMILY MEDICINE | Facility: CLINIC | Age: 75
End: 2020-06-08

## 2020-06-08 DIAGNOSIS — I10 HYPERTENSION GOAL BP (BLOOD PRESSURE) < 140/90: Primary | ICD-10-CM

## 2020-06-09 RX ORDER — AMLODIPINE BESYLATE 5 MG/1
5 TABLET ORAL DAILY
Qty: 30 TABLET | Refills: 0 | Status: SHIPPED | OUTPATIENT
Start: 2020-06-09 | End: 2020-06-26

## 2020-06-10 ENCOUNTER — MYC MEDICAL ADVICE (OUTPATIENT)
Dept: FAMILY MEDICINE | Facility: CLINIC | Age: 75
End: 2020-06-10

## 2020-06-10 NOTE — TELEPHONE ENCOUNTER
Have her check again on Thursday and let me know - how many days has she taken the metoprolol 25mg AM and 50mg at bedtime?  Thanks  PN

## 2020-06-11 ENCOUNTER — NURSE TRIAGE (OUTPATIENT)
Dept: NURSING | Facility: CLINIC | Age: 75
End: 2020-06-11

## 2020-06-11 NOTE — TELEPHONE ENCOUNTER
Please have her increase her metoprolol XL to 50mg twice a day   BPs are still too high and pulse can tolerate going up on the dose  Thanks  PN

## 2020-06-11 NOTE — TELEPHONE ENCOUNTER
"Pt confused about which blood pressure meds to take. Previous [employer] messages state pt was taking metoprolol 25 mg am and 50 mg pm for past two weeks and amlodipine 5 mg was started last night. Pt's blood pressure continued to be high so Dr. Wilkerson increased metoprolol to 50 mg twice daily however pt reports she had been taking metoprolol 75 mg at bedtime and thought the amlodipine was to replace that so she only took 5 mg amlodipine last night. Pt reports she has not taken any blood pressure meds yet today. Pt reports she is \"Feeling ok\" right now. Pt reports her blood pressure 194/101 pulse 81 just now.     Writer contacted Dr. Barr who advises pt should take 50 mg metoprolol and 5 mg amlodipine tonight and follow up with PCP tomorrow. Take 50 mg metoprolol in the morning unless hearing otherwise from PCP. Writer contacted pt and advised her of on call provider Dr. Barr advice.     Pt verbalizes understanding and agrees to plan.     Reason for Disposition    Caller has urgent medication question about med that PCP prescribed and triager unable to answer question    Protocols used: MEDICATION QUESTION CALL-A-AH      "

## 2020-06-15 ENCOUNTER — MYC MEDICAL ADVICE (OUTPATIENT)
Dept: FAMILY MEDICINE | Facility: CLINIC | Age: 75
End: 2020-06-15

## 2020-06-22 ENCOUNTER — MYC MEDICAL ADVICE (OUTPATIENT)
Dept: FAMILY MEDICINE | Facility: CLINIC | Age: 75
End: 2020-06-22

## 2020-06-22 DIAGNOSIS — I10 HYPERTENSION GOAL BP (BLOOD PRESSURE) < 140/90: ICD-10-CM

## 2020-06-25 DIAGNOSIS — I10 HYPERTENSION GOAL BP (BLOOD PRESSURE) < 140/90: ICD-10-CM

## 2020-06-25 RX ORDER — HYDROCHLOROTHIAZIDE 25 MG/1
TABLET ORAL
Refills: 0
Start: 2020-06-25

## 2020-06-26 RX ORDER — AMLODIPINE BESYLATE 10 MG/1
10 TABLET ORAL DAILY
Qty: 90 TABLET | Refills: 1 | Status: SHIPPED | OUTPATIENT
Start: 2020-06-26 | End: 2020-12-08

## 2020-06-29 ENCOUNTER — ANCILLARY PROCEDURE (OUTPATIENT)
Dept: MRI IMAGING | Facility: CLINIC | Age: 75
End: 2020-06-29
Attending: PSYCHIATRY & NEUROLOGY
Payer: COMMERCIAL

## 2020-06-29 ENCOUNTER — MYC MEDICAL ADVICE (OUTPATIENT)
Dept: FAMILY MEDICINE | Facility: CLINIC | Age: 75
End: 2020-06-29

## 2020-06-30 NOTE — TELEPHONE ENCOUNTER
"PN  Please see [employer]argott    Had VV with you for HTN on 6/2/20  \"Will have her dose of metoprolol increased from 50mg at bedtime to 25mg AM and 50 at bedtime -\"    Did you want another VV?  Thank you,  Cris Felipe RN    "

## 2020-07-01 ENCOUNTER — MYC MEDICAL ADVICE (OUTPATIENT)
Dept: FAMILY MEDICINE | Facility: CLINIC | Age: 75
End: 2020-07-01

## 2020-07-03 NOTE — TELEPHONE ENCOUNTER
Sent pt message in other encounter  Think she needs some type of virtual visit because BP is too high.  PN

## 2020-07-04 ENCOUNTER — MYC MEDICAL ADVICE (OUTPATIENT)
Dept: FAMILY MEDICINE | Facility: CLINIC | Age: 75
End: 2020-07-04

## 2020-07-05 NOTE — PROGRESS NOTES
[facility] Neurology Follow Up Visit    Harriet Lance MRN# [id number]   Age: 75 year old YOB: 1945     Brief history of symptoms: The patient was initially seen in neurologic consultation on 2/27/2020 for evaluation of facial and sensory changes with concern for Trigeminal Neuralgia. Please see the comprehensive neurologic consultation note from that date in the Epic records for details. Impression was for right occipital neuralgia, paroxysmal.  Her dannielle-cranial symptoms didn't radiate into her face, and it was thought that she may benefit from alternative therapy for relief while awaiting further imaging.  MRI brain was relatively normal, but MRI cervical spine did show multiple levels of cervical spondylosis with moderate spinal canal stenosis at C5-7, and moderate right neural foraminal stenosis at C3-4.    Interval history: The patient has less head pain or tingling on the right side, but still is having tightness with pain in her right shoulder, neck and upper arm.  The pain isn't triggered by specific movements, but she does not that prolonged staring at her computer (neck flexion) leads to worsened shoulder and neck pain by the end of the day.      Past Medical History:     Patient Active Problem List   Diagnosis     Allergic rhinitis     Diffuse cystic mastopathy     History of malignant neoplasm of large intestine     Enlarged lymph nodes     CARDIOVASCULAR SCREENING; LDL GOAL LESS THAN 100     Borderline glaucoma (glaucoma suspect)     Hypertension goal BP (blood pressure) < 140/90     Advanced directives, counseling/discussion     Arthritis of hip     HL (hearing loss)     Hyperlipidemia with target LDL less than 130     BMI 37.0-37.9, adult     Benign neoplasm of colon     Past Medical History:   Diagnosis Date     Allergic rhinitis, cause unspecified      Chronic renal disease     normal creatinine since 2011     Colon cancer (H) 1998    chemotherapy     Diffuse cystic mastopathy      calcifications on the right breast - followed closely     Hypertension      Personal history of malignant neoplasm of large intestine 1998    had chemotherapy after colon resection -      Unspecified glaucoma(365.9) 2005    Early - followed by  Opthamologist - both eyes      Medications:     Current Outpatient Medications   Medication Sig     amLODIPine (NORVASC) 10 MG tablet Take 1 tablet (10 mg) by mouth daily     ASPIRIN 81 MG OR TABS 1 tab po QD (Once per day)     Coenzyme Q10 (CO Q10) 100 MG CAPS      FISH OIL 1000 MG OR CAPS 2 capsules once per day     hydrochlorothiazide (HYDRODIURIL) 25 MG tablet TAKE 1 TABLET(25 MG) BY MOUTH DAILY     LORazepam (ATIVAN) 1 MG tablet Take 1 tablet (1 mg) by mouth every 6 hours as needed for anxiety (Patient not taking: Reported on 6/2/2020)     metoprolol succinate ER (TOPROL-XL) 50 MG 24 hr tablet Take 25mg (1/2 tablet) in the AM and 50mg in the PM     Multiple Vitamins-Minerals (I-DARBY) TABS Take 2 tablets by mouth daily     pravastatin (PRAVACHOL) 40 MG tablet TAKE 1 TABLET(40 MG) BY MOUTH DAILY     vitamin D3 (CHOLECALCIFEROL) 2000 units tablet Take 1 tablet by mouth daily     ZYRTEC 10 MG OR TABS 1 TABLET DAILY      Review of Systems:   A comprehensive 10 point review of systems (constitutional, ENT, cardiac, peripheral vascular, lymphatic, respiratory, GI, , Musculoskeletal, skin, Neurological) was performed and found to be negative except as described in this note.     Pertinent Investigations since last visit:   MRI cervical spine: 6/29/2020  Findings:  There is straightening of the cervical lordosis. Subtle grade 1  anterolisthesis of C4 on C5 and grade 1 anterolisthesis of C7 on T1.  Multilevel disc space height loss most pronounced at C5-6 where it is  moderate. There is normal signal within and normal contour of the  cervical spinal cord.  The findings on a level by level basis are as  follows:  C2-3:  Uncovertebral spurring and bilateral facet hypertrophy. No  significant spinal canal or neural foraminal stenosis.  C3-4: Right eccentric disc osteophyte complex and right greater than left uncovertebral spurring. Right greater than left facet hypertrophy. Mild left and moderate right neural foraminal stenosis. Mild spinal canal stenosis.   C4-5:  Right eccentric disc osteophyte complex and right greater than left facet hypertrophy. Mild to moderate right neural foraminal stenosis. No left neural foraminal stenosis. Mild-to-moderate spinal canal stenosis..  C5-6: Disc osteophyte complex uncovertebral spurring. Mild bilateral neuroforaminal stenosis. Moderate spinal canal stenosis.  C6-7: Disc osteophyte complex. Moderate spinal canal stenosis. Mild to moderate bilateral neural stenosis.  C7-T1:  No spinal canal or neural foraminal stenosis.  No abnormality of the paraspinous soft tissues. Bilateral mastoid effusions.                                                             Impression:   Multilevel cervical spondylosis with moderate spinal canal stenosis at C5-6 and C6-C7 and mild-to-moderate at C4-5. Moderate right neural foraminal stenosis at C3-4.    MRI brain w/wout: 6/29/2020                                                                Impression:   1. No definite mass lesion, acute infarction or hydrocephalus  2. No definite evidence for intracranial metastatic disease, however small metastasis could be inconspicuous on the noncontrast examination.  3. Mild Leukoaraiosis.         Assessment and Plan:   Assessment:  - Cervicalgia    The patient has symptoms of right shoulder/neck tenderness/dysesthesia but is without weakness or other radiating sensory deficits.  She has imaging supportive for R-C3/4 neural foraminal stenosis as wel as moderate spinal canal stenosis of C5-7.  Together, the patient likely has some cervicalgia leading to soreness of musculature and then headaches as a result.  I would like her to see Neurosurgery to evaluate possible injection  therapies or surgery that could be helpful for her ongoing symptoms.  If no intervention is recommended, the patient may benefit from botox or steroid injection at levels of neural foraminal stenosis.  If symptoms progress, an EMG may be helpful to localize acute or chronic radiculopath.      Plan:  -NSGY referral    Future:  EMG, greater occipital nerve block/cervical nerve block    Follow up in Neurology clinic in 2 months or earlier as needed should new concerns arise.    ОЛЬГА Méndez D.O.   of Neurology    Greater than 50% of the total time (30 min) in this patient encounter was spent on counseling and/or coordination of care. We reviewed diagnostic results, impressions, and discussed other possible tests if symptoms do not improve. We discussed the implications of the diagnosis, as well as risks and benefits of management options. We reviewed treatment instructions and our scheduled follow-up as specified in the discharge plan. We also discussed the importance of compliance with the chosen course of treatment. The patient is in agreement with this plan and has no further questions.

## 2020-07-06 ENCOUNTER — OFFICE VISIT (OUTPATIENT)
Dept: NEUROLOGY | Facility: CLINIC | Age: 75
End: 2020-07-06
Payer: COMMERCIAL

## 2020-07-06 VITALS — SYSTOLIC BLOOD PRESSURE: 119 MMHG | HEART RATE: 59 BPM | DIASTOLIC BLOOD PRESSURE: 72 MMHG | OXYGEN SATURATION: 94 %

## 2020-07-06 DIAGNOSIS — M54.2 CERVICALGIA: Primary | ICD-10-CM

## 2020-07-06 RX ORDER — AMLODIPINE BESYLATE 5 MG/1
5 TABLET ORAL DAILY
COMMUNITY
Start: 2020-06-09 | End: 2020-07-14 | Stop reason: DRUGHIGH

## 2020-07-06 ASSESSMENT — PAIN SCALES - GENERAL: PAINLEVEL: NO PAIN (0)

## 2020-07-06 NOTE — TELEPHONE ENCOUNTER
Pn,  Please see below amd see next [employer] encounter with readings 7/5 and 7/6.  Did you want a virtual visit?  Please advise.  Thanks,  Elizabeth Noe RN

## 2020-07-06 NOTE — NURSING NOTE
Chief Complaint   Patient presents with     RECHECK     P VIDEO RETURN - FOLLOW UP AFTER MRI     Mahogany Gonzalez, EMT

## 2020-07-06 NOTE — LETTER
7/6/2020       RE: Harriet Lance  [address] [facility] [address]     Dear Colleague,    Thank you for referring your patient, Harriet Lance, to the [facility] NEUROLOGY at [facility]. Please see a copy of my visit note below.    [facility] Neurology Follow Up Visit    Harriet Lance MRN# [id number]   Age: 75 year old YOB: 1945     Brief history of symptoms: The patient was initially seen in neurologic consultation on 2/27/2020 for evaluation of facial and sensory changes with concern for Trigeminal Neuralgia. Please see the comprehensive neurologic consultation note from that date in the Epic records for details. Impression was for right occipital neuralgia, paroxysmal.  Her dannielle-cranial symptoms didn't radiate into her face, and it was thought that she may benefit from alternative therapy for relief while awaiting further imaging.  MRI brain was relatively normal, but MRI cervical spine did show multiple levels of cervical spondylosis with moderate spinal canal stenosis at C5-7, and moderate right neural foraminal stenosis at C3-4.    Interval history: The patient has less head pain or tingling on the right side, but still is having tightness with pain in her right shoulder, neck and upper arm.  The pain isn't triggered by specific movements, but she does not that prolonged staring at her computer (neck flexion) leads to worsened shoulder and neck pain by the end of the day.      Past Medical History:     Patient Active Problem List   Diagnosis     Allergic rhinitis     Diffuse cystic mastopathy     History of malignant neoplasm of large intestine     Enlarged lymph nodes     CARDIOVASCULAR SCREENING; LDL GOAL LESS THAN 100     Borderline glaucoma (glaucoma suspect)     Hypertension goal BP (blood pressure) < 140/90     Advanced directives, counseling/discussion     Arthritis of hip     HL (hearing loss)     Hyperlipidemia with target LDL less  than 130     BMI 37.0-37.9, adult     Benign neoplasm of colon     Past Medical History:   Diagnosis Date     Allergic rhinitis, cause unspecified      Chronic renal disease     normal creatinine since 2011     Colon cancer (H) 1998    chemotherapy     Diffuse cystic mastopathy     calcifications on the right breast - followed closely     Hypertension      Personal history of malignant neoplasm of large intestine 1998    had chemotherapy after colon resection -      Unspecified glaucoma(365.9) 2005    Early - followed by  Opthamologist - both eyes      Medications:     Current Outpatient Medications   Medication Sig     amLODIPine (NORVASC) 10 MG tablet Take 1 tablet (10 mg) by mouth daily     ASPIRIN 81 MG OR TABS 1 tab po QD (Once per day)     Coenzyme Q10 (CO Q10) 100 MG CAPS      FISH OIL 1000 MG OR CAPS 2 capsules once per day     hydrochlorothiazide (HYDRODIURIL) 25 MG tablet TAKE 1 TABLET(25 MG) BY MOUTH DAILY     LORazepam (ATIVAN) 1 MG tablet Take 1 tablet (1 mg) by mouth every 6 hours as needed for anxiety (Patient not taking: Reported on 6/2/2020)     metoprolol succinate ER (TOPROL-XL) 50 MG 24 hr tablet Take 25mg (1/2 tablet) in the AM and 50mg in the PM     Multiple Vitamins-Minerals (I-DARBY) TABS Take 2 tablets by mouth daily     pravastatin (PRAVACHOL) 40 MG tablet TAKE 1 TABLET(40 MG) BY MOUTH DAILY     vitamin D3 (CHOLECALCIFEROL) 2000 units tablet Take 1 tablet by mouth daily     ZYRTEC 10 MG OR TABS 1 TABLET DAILY      Review of Systems:   A comprehensive 10 point review of systems (constitutional, ENT, cardiac, peripheral vascular, lymphatic, respiratory, GI, , Musculoskeletal, skin, Neurological) was performed and found to be negative except as described in this note.     Pertinent Investigations since last visit:   MRI cervical spine: 6/29/2020  Findings:  There is straightening of the cervical lordosis. Subtle grade 1  anterolisthesis of C4 on C5 and grade 1 anterolisthesis of C7 on  T1.  Multilevel disc space height loss most pronounced at C5-6 where it is  moderate. There is normal signal within and normal contour of the  cervical spinal cord.  The findings on a level by level basis are as  follows:  C2-3:  Uncovertebral spurring and bilateral facet hypertrophy. No significant spinal canal or neural foraminal stenosis.  C3-4: Right eccentric disc osteophyte complex and right greater than left uncovertebral spurring. Right greater than left facet hypertrophy. Mild left and moderate right neural foraminal stenosis. Mild spinal canal stenosis.   C4-5:  Right eccentric disc osteophyte complex and right greater than left facet hypertrophy. Mild to moderate right neural foraminal stenosis. No left neural foraminal stenosis. Mild-to-moderate spinal canal stenosis..  C5-6: Disc osteophyte complex uncovertebral spurring. Mild bilateral neuroforaminal stenosis. Moderate spinal canal stenosis.  C6-7: Disc osteophyte complex. Moderate spinal canal stenosis. Mild to moderate bilateral neural stenosis.  C7-T1:  No spinal canal or neural foraminal stenosis.  No abnormality of the paraspinous soft tissues. Bilateral mastoid effusions.                                                             Impression:   Multilevel cervical spondylosis with moderate spinal canal stenosis at C5-6 and C6-C7 and mild-to-moderate at C4-5. Moderate right neural foraminal stenosis at C3-4.    MRI brain w/wout: 6/29/2020                                                                Impression:   1. No definite mass lesion, acute infarction or hydrocephalus  2. No definite evidence for intracranial metastatic disease, however small metastasis could be inconspicuous on the noncontrast examination.  3. Mild Leukoaraiosis.         Assessment and Plan:   Assessment:  - Cervicalgia    The patient has symptoms of right shoulder/neck tenderness/dysesthesia but is without weakness or other radiating sensory deficits.  She has imaging  supportive for R-C3/4 neural foraminal stenosis as wel as moderate spinal canal stenosis of C5-7.  Together, the patient likely has some cervicalgia leading to soreness of musculature and then headaches as a result.  I would like her to see Neurosurgery to evaluate possible injection therapies or surgery that could be helpful for her ongoing symptoms.  If no intervention is recommended, the patient may benefit from botox or steroid injection at levels of neural foraminal stenosis.  If symptoms progress, an EMG may be helpful to localize acute or chronic radiculopath.      Plan:  -NSGY referral    Future:  EMG, greater occipital nerve block/cervical nerve block    Follow up in Neurology clinic in 2 months or earlier as needed should new concerns arise.    ОЛЬГА Méndez D.O.   of Neurology    Greater than 50% of the total time (30 min) in this patient encounter was spent on counseling and/or coordination of care. We reviewed diagnostic results, impressions, and discussed other possible tests if symptoms do not improve. We discussed the implications of the diagnosis, as well as risks and benefits of management options. We reviewed treatment instructions and our scheduled follow-up as specified in the discharge plan. We also discussed the importance of compliance with the chosen course of treatment. The patient is in agreement with this plan and has no further questions.

## 2020-07-09 ENCOUNTER — TELEPHONE (OUTPATIENT)
Dept: NEUROSURGERY | Facility: CLINIC | Age: 75
End: 2020-07-09

## 2020-07-13 ENCOUNTER — MYC MEDICAL ADVICE (OUTPATIENT)
Dept: FAMILY MEDICINE | Facility: CLINIC | Age: 75
End: 2020-07-13

## 2020-07-14 ENCOUNTER — VIRTUAL VISIT (OUTPATIENT)
Dept: FAMILY MEDICINE | Facility: CLINIC | Age: 75
End: 2020-07-14
Payer: COMMERCIAL

## 2020-07-14 DIAGNOSIS — I10 HYPERTENSION GOAL BP (BLOOD PRESSURE) < 140/90: ICD-10-CM

## 2020-07-14 PROCEDURE — 99213 OFFICE O/P EST LOW 20 MIN: CPT | Mod: 95 | Performed by: FAMILY MEDICINE

## 2020-07-14 RX ORDER — LOSARTAN POTASSIUM 25 MG/1
25 TABLET ORAL DAILY
Qty: 30 TABLET | Refills: 1 | Status: SHIPPED | OUTPATIENT
Start: 2020-07-14 | End: 2020-09-08

## 2020-07-14 RX ORDER — METOPROLOL SUCCINATE 50 MG/1
TABLET, EXTENDED RELEASE ORAL
Qty: 135 TABLET | Refills: 0 | COMMUNITY
Start: 2020-07-14 | End: 2020-10-20

## 2020-07-14 NOTE — PROGRESS NOTES
"Harriet Lance is a 75 year old female who is being evaluated via a billable video visit.      The patient has been notified of following:     \"This video visit will be conducted via a call between you and your physician/provider. We have found that certain health care needs can be provided without the need for an in-person physical exam.  This service lets us provide the care you need with a video conversation.  If a prescription is necessary we can send it directly to your pharmacy.  If lab work is needed we can place an order for that and you can then stop by our lab to have the test done at a later time.    Video visits are billed at different rates depending on your insurance coverage.  Please reach out to your insurance provider with any questions.    If during the course of the call the physician/provider feels a video visit is not appropriate, you will not be charged for this service.\"    Patient has given verbal consent for Video visit? Yes  How would you like to obtain your AVS? [place]  Patient would like the video invitation sent by: Text to cell phone: [phone]  Will anyone else be joining your video visit? No     Subjective     Harriet Lance is a 75 year old female who presents today via video visit for the following health issues:    HPI  Hypertension Follow-up      Do you check your blood pressure regularly outside of the clinic? Yes     Are you following a low salt diet? Yes    Are your blood pressures ever more than 140 on the top number (systolic) OR more   than 90 on the bottom number (diastolic), for example 140/90? Yes      How many servings of fruits and vegetables do you eat daily?  2-3    On average, how many sweetened beverages do you drink each day (Examples: soda, juice, sweet tea, etc.  Do NOT count diet or artificially sweetened beverages)?   0    How many days per week do you exercise enough to make your heart beat faster? 3 or less    How many minutes a day do you exercise " enough to make your heart beat faster? 20 - 29    How many days per week do you miss taking your medication? 0    This morning was high 160/80  Over the past few months has gained weight   Now weight is up about 30 pounds  During this time she has had increase in meds  Metoprolol is 50mg BID  norvasc 10mg daily and hydrochlorothiazide  Still BP is too high       Video Start Time: 12:25pm      Patient Active Problem List   Diagnosis     Allergic rhinitis     Diffuse cystic mastopathy     History of malignant neoplasm of large intestine     Enlarged lymph nodes     CARDIOVASCULAR SCREENING; LDL GOAL LESS THAN 100     Borderline glaucoma (glaucoma suspect)     Hypertension goal BP (blood pressure) < 140/90     Advanced directives, counseling/discussion     Arthritis of hip     HL (hearing loss)     Hyperlipidemia with target LDL less than 130     BMI 37.0-37.9, adult     Benign neoplasm of colon     Past Surgical History:   Procedure Laterality Date     AS TOTAL HIP ARTHROPLASTY Right 2017    done at [facility]     C REMOVAL COLON/ILEOSTOMY      8 inches of colon removed - not clear what section       Social History     Tobacco Use     Smoking status: Never Smoker     Smokeless tobacco: Never Used   Substance Use Topics     Alcohol use: Never     Alcohol/week: 0.0 standard drinks     Comment: rarely     Family History   Problem Relation Age of Onset     Heart Disease Mother          of heart Attack at age 75     Hypertension Mother      Cancer Father         Prostate     Diabetes Father         Type II     Prostate Cancer Father      Allergies Father      Cancer - colorectal Maternal Grandmother         Colon Resection     Breast Cancer Maternal Grandmother         Mastectomy     Cancer - colorectal Maternal Uncle      Glaucoma No family hx of      Macular Degeneration No family hx of            Reviewed and updated as needed this visit by Provider         Review of Systems   Constitutional, HEENT,  "cardiovascular, pulmonary, GI, , musculoskeletal, neuro, skin, endocrine and psych systems are negative, except as otherwise noted.      Objective             Physical Exam     GENERAL: Healthy, alert and no distress  EYES: Eyes grossly normal to inspection.  No discharge or erythema, or obvious scleral/conjunctival abnormalities.  RESP: No audible wheeze, cough, or visible cyanosis.  No visible retractions or increased work of breathing.    SKIN: Visible skin clear. No significant rash, abnormal pigmentation or lesions.  NEURO: Cranial nerves grossly intact.  Mentation and speech appropriate for age.  PSYCH: Mentation appears normal, affect normal/bright, judgement and insight intact, normal speech and appearance well-groomed.      Diagnostic Test Results:  Labs reviewed in Epic        Assessment & Plan     1. Hypertension goal BP (blood pressure) < 140/90  BP is still not controlled on current meds   Will add losartan 25mg daily to the current meds  Will recheck BP in 2 weeks after adding and let me know  Also plan to check lab in 4-6 weeks  - metoprolol succinate ER (TOPROL-XL) 50 MG 24 hr tablet; Take 50mg in the AM and 50mg in the PM  Dispense: 135 tablet; Refill: 0  - losartan (COZAAR) 25 MG tablet; Take 1 tablet (25 mg) by mouth daily  Dispense: 30 tablet; Refill: 1  - **Comprehensive metabolic panel FUTURE anytime; Future     BMI:   Estimated body mass index is 32.28 kg/m  as calculated from the following:    Height as of 1/21/20: 1.638 m (5' 4.5\").    Weight as of 2/27/20: 86.6 kg (191 lb).   Weight management plan: Discussed healthy diet and exercise guidelines          No follow-ups on file.    Jolanta Wilkerson DO  [facility]      Video-Visit Details    Type of service:  Video Visit    Video End Time:12:40pm    Originating Location (pt. Location): Home    Distant Location (provider location):  [facility]     Platform used for Video Visit: Geoff    No follow-ups on file. "       Jolanta Wilkerson, DO

## 2020-07-20 ENCOUNTER — MYC MEDICAL ADVICE (OUTPATIENT)
Dept: FAMILY MEDICINE | Facility: CLINIC | Age: 75
End: 2020-07-20

## 2020-07-20 ENCOUNTER — VIRTUAL VISIT (OUTPATIENT)
Dept: NEUROSURGERY | Facility: CLINIC | Age: 75
End: 2020-07-20
Payer: COMMERCIAL

## 2020-07-20 DIAGNOSIS — M54.2 NECK DISCOMFORT: Primary | ICD-10-CM

## 2020-07-20 NOTE — PATIENT INSTRUCTIONS
~Continue with Neurology.    ~May benefit from Botox injection for occipital neualgia , Headache  ~Mailed Neck Exercise booklet for strengthening, stretching and range of motion exercises     (patient has done her own therapy in the past and has had good benefit)  ~Unless she develops new pain or symptoms, she will be discharged from the Neurosurgery Clinic at this time, and can return on an as-needed basis.       At the end of the visit, all the patient's questions and concerns had been addressed and the patient was agreeable with the plan of care as outlined above. The patient has our office contact information at [phone], and knows to call with any questions, concerns, or changes in condition.

## 2020-07-20 NOTE — LETTER
"7/20/2020       RE: Harriet Lance  [address] [facility] [address]     Dear Colleague,    Thank you for referring your patient, Harriet Lance, to the [facility] NEUROSURGERY at [facility]. Please see a copy of my visit note below.    Harriet Lance is a 75 year old female who is being evaluated via a billable video visit.    This is a video/telephone visit conducted due to [facility] restrictions on non-urgent clinic visits due to risk of the spread of COVID-19 pandemic virus. The patient did not identify any urgent or red-flag symptoms that would require in-person evaluation.        History of present illness:  Ms. Lance is a 75 yr-old females with PMH  Chronic renal disease, colon CA, Hypertension, glaucoma and bilateral hip replacement who is seen at the request of Dr. Méndez in Neurology following her cervical spine MRI imaging.   She does not really have a hx of significant neck pain.  She started a new job and was sitting on her bed working on a small computer and was \"crunching\" her neck for a couple weeks.   She was having some discomfort on the right side of her neck. She feels it may have been r/t to her neck positioning.     She was having some Headache (thought assoc w/the neck discomfort) and seen in Neurology.   No signifiant radiating pain, numbness, or tingling in upper extremities.  No weakness.   No significant  numbness or tingling in hands/fingers.     No bowel or bladder concerns or complaints   No difficulty with balance or gait.   No numbness or tinging in feet or toes.     Review of systems: 10 point ROS negative except for as detailed in HPI    Past Medical History:   Past Medical History:   Diagnosis Date     Allergic rhinitis, cause unspecified      Chronic renal disease     normal creatinine since 2011     Colon cancer (H) 1998    chemotherapy     Diffuse cystic mastopathy     " calcifications on the right breast - followed closely     Hypertension      Personal history of malignant neoplasm of large intestine     had chemotherapy after colon resection -      Unspecified glaucoma(365.9)     Early - followed by  Opthamologist - both eyes     Surgical History:   Past Surgical History:   Procedure Laterality Date     AS TOTAL HIP ARTHROPLASTY Right 2017     C REMOVAL COLON/ILEOSTOMY      8 inches of colon removed - not clear what section     Medications:  Current Outpatient Medications   Medication     amLODIPine (NORVASC) 10 MG tablet     ASPIRIN 81 MG OR TABS     Coenzyme Q10 (CO Q10) 100 MG CAPS     FISH OIL 1000 MG OR CAPS     hydrochlorothiazide (HYDRODIURIL) 25 MG tablet     LORazepam (ATIVAN) 1 MG tablet     losartan (COZAAR) 25 MG tablet     metoprolol succinate ER (TOPROL-XL) 50 MG 24 hr tablet     Multiple Vitamins-Minerals (I-DARBY) TABS     pravastatin (PRAVACHOL) 40 MG tablet     vitamin D3 (CHOLECALCIFEROL) 2000 units tablet     ZYRTEC 10 MG OR TABS     No current facility-administered medications for this visit.        Social History     Tobacco Use     Smoking status: Never Smoker     Smokeless tobacco: Never Used   Substance Use Topics     Alcohol use: Never     Alcohol/week: 0.0 standard drinks     Comment: rarely     Drug use: No     Family History   Problem Relation Age of Onset     Heart Disease Mother          of heart Attack at age 75     Hypertension Mother      Cancer Father         Prostate     Diabetes Father         Type II     Prostate Cancer Father      Allergies Father      Cancer - colorectal Maternal Grandmother         Colon Resection     Breast Cancer Maternal Grandmother         Mastectomy     Cancer - colorectal Maternal Uncle          Physical exam:   There were no vitals taken for this visit.    General    Alert, cooperative.  No acute distress  Pulmonary:   Breathing comfortably on room air. No cough, or shortness of breath  Skin:     Visible skin without lesions or obvious rash  Speech is fluent  Maintains eye contact  Musculoskeletal:    Moving extremities freely with good strength  Excellent and full cervical range of motion   She is able to raise her arms above her head.     Imaging:  MR CERVICAL SPINE W/O CONTRAST 6/29/2020 11:39 AM     Provided History: neck pain, atypical occpitial neuralgia symptoms;  Neuralgia  ICD-10: Neuralgia     Comparison: None     Technique: Sagittal T1-weighted, sagittal T2-weighted, sagittal STIR,  sagittal diffusion weighted, axial T2-weighted, and axial T2* gradient  echo images of the cervical spine were obtained without intravenous  contrast.     Findings:  There is straightening of the cervical lordosis. Subtle grade 1  anterolisthesis of C4 on C5 and grade 1 anterolisthesis of C7 on T1.  Multilevel disc space height loss most pronounced at C5-6 where it is  moderate. There is normal signal within and normal contour of the  cervical spinal cord.  The findings on a level by level basis are as  follows:     C2-3:  Uncovertebral spurring and bilateral facet hypertrophy. No  significant spinal canal or neural foraminal stenosis.     C3-4: Right eccentric disc osteophyte complex and right greater than  left uncovertebral spurring. Right greater than left facet  hypertrophy. Mild left and moderate right neural foraminal stenosis.  Mild spinal canal stenosis.      C4-5:  Right eccentric disc osteophyte complex and right greater than  left facet hypertrophy. Mild to moderate right neural foraminal  stenosis. No left neural foraminal stenosis. Mild-to-moderate spinal  canal stenosis..     C5-6: Disc osteophyte complex uncovertebral spurring. Mild bilateral  neuroforaminal stenosis. Moderate spinal canal stenosis.     C6-7: Disc osteophyte complex. Moderate spinal canal stenosis. Mild to  moderate bilateral neural stenosis.     C7-T1:  No spinal canal or neural foraminal stenosis.     No abnormality of the  paraspinous soft tissues. Bilateral mastoid  effusions.                                                                      Impression:   Multilevel cervical spondylosis with moderate spinal canal stenosis at  C5-6 and C6-C7 and mild-to-moderate at C4-5. Moderate right neural  foraminal stenosis at C3-4.     I have personally reviewed the examination and initial interpretation  and I agree with the findings.     NORIS DONALDSON MD    Assessment:    ~Mild to moderate spinal canal stenosis at C5-6 and C6-C7 and  ~Moderate right neural foraminal stenosis at C3-4.  ~No myelopathic symptoms or signs/symptoms of cervical radiculopathy       Plan:  ~Continue with Neurology.    ~May benefit from Botox injection for occipital neualgia  ~Mailed Neck Exercise booklet for strengthening, stretching and range of motion exercises     (patient has done her own therapy in the past and had good benefit)  ~Unless she develops new pain or symptoms, she will be discharged from the Neurosurgery Clinic at this time, and can return on an as-needed basis.       At the end of the visit, all the patient's questions and concerns had been addressed and the patient was agreeable with the plan of care as outlined above. The patient has our office contact information at [phone], and knows to call with any questions, concerns, or changes in condition.        Lashell Mcdonald DNP  Neurosurgery Nurse Practitioner  [facility]  [phone]    Video-Visit Details    Type of service:  Video Visit    Video Duration:   21  Minutes     Originating Location (pt. Location): Home    Distant Location (provider location):  [facility] NEUROSURGERY     Platform used for Video Visit :Geoff

## 2020-07-20 NOTE — PROGRESS NOTES
"Harriet Lance is a 75 year old female who is being evaluated via a billable video visit.    This is a video/telephone visit conducted due to [facility] restrictions on non-urgent clinic visits due to risk of the spread of COVID-19 pandemic virus. The patient did not identify any urgent or red-flag symptoms that would require in-person evaluation.    The patient has been notified of following:     \"This video visit will be conducted via a call between you and your physician/provider. We have found that certain health care needs can be provided without the need for an in-person physical exam.  This service lets us provide the care you need with a video conversation.  If a prescription is necessary we can send it directly to your pharmacy.  If lab work is needed we can place an order for that and you can then stop by our lab to have the test done at a later time.    Video visits are billed at different rates depending on your insurance coverage.  Please reach out to your insurance provider with any questions.    If during the course of the call the physician/provider feels a video visit is not appropriate, you will not be charged for this service.\"    Patient has given verbal consent for Video visit?    YES  How would you like to obtain your AVS? MyChart  If you are dropped from the video visit, the video invite should be resent to: [phone]  Will anyone else be joining your video visit? no        History of present illness:  Ms. Lance is a 75 yr-old females with PMH  Chronic renal disease, colon CA, Hypertension, glaucoma and bilateral hip replacement who is seen at the request of Dr. Méndez in Neurology following her cervical spine MRI imaging.   She does not really have a hx of significant neck pain.  She started a new job and was sitting on her bed working on a small computer and was \"crunching\" her neck for a couple weeks.   She was having some discomfort on the " right side of her neck. She feels it may have been r/t to her neck positioning.     She was having some Headache (thought assoc w/the neck discomfort) and seen in Neurology.   No signifiant radiating pain, numbness, or tingling in upper extremities.  No weakness.   No significant  numbness or tingling in hands/fingers.     No bowel or bladder concerns or complaints   No difficulty with balance or gait.   No numbness or tinging in feet or toes.     Review of systems: 10 point ROS negative except for as detailed in HPI    Past Medical History:   Past Medical History:   Diagnosis Date     Allergic rhinitis, cause unspecified      Chronic renal disease     normal creatinine since 2011     Colon cancer (H) 1998    chemotherapy     Diffuse cystic mastopathy     calcifications on the right breast - followed closely     Hypertension      Personal history of malignant neoplasm of large intestine 1998    had chemotherapy after colon resection -      Unspecified glaucoma(365.9) 2005    Early - followed by  Opthamologist - both eyes     Surgical History:   Past Surgical History:   Procedure Laterality Date     AS TOTAL HIP ARTHROPLASTY Right 04/13/2017     C REMOVAL COLON/ILEOSTOMY  1998    8 inches of colon removed - not clear what section     Medications:  Current Outpatient Medications   Medication     amLODIPine (NORVASC) 10 MG tablet     ASPIRIN 81 MG OR TABS     Coenzyme Q10 (CO Q10) 100 MG CAPS     FISH OIL 1000 MG OR CAPS     hydrochlorothiazide (HYDRODIURIL) 25 MG tablet     LORazepam (ATIVAN) 1 MG tablet     losartan (COZAAR) 25 MG tablet     metoprolol succinate ER (TOPROL-XL) 50 MG 24 hr tablet     Multiple Vitamins-Minerals (I-DARBY) TABS     pravastatin (PRAVACHOL) 40 MG tablet     vitamin D3 (CHOLECALCIFEROL) 2000 units tablet     ZYRTEC 10 MG OR TABS     No current facility-administered medications for this visit.        Social History     Tobacco Use     Smoking status: Never Smoker     Smokeless tobacco:  Never Used   Substance Use Topics     Alcohol use: Never     Alcohol/week: 0.0 standard drinks     Comment: rarely     Drug use: No     Family History   Problem Relation Age of Onset     Heart Disease Mother          of heart Attack at age 75     Hypertension Mother      Cancer Father         Prostate     Diabetes Father         Type II     Prostate Cancer Father      Allergies Father      Cancer - colorectal Maternal Grandmother         Colon Resection     Breast Cancer Maternal Grandmother         Mastectomy     Cancer - colorectal Maternal Uncle          Physical exam:   There were no vitals taken for this visit.    General    Alert, cooperative.  No acute distress  Pulmonary:   Breathing comfortably on room air. No cough, or shortness of breath  Skin:    Visible skin without lesions or obvious rash  Speech is fluent  Maintains eye contact  Musculoskeletal:    Moving extremities freely with good strength  Excellent and full cervical range of motion   She is able to raise her arms above her head.     Imaging:  MR CERVICAL SPINE W/O CONTRAST 2020 11:39 AM     Provided History: neck pain, atypical occpitial neuralgia symptoms;  Neuralgia  ICD-10: Neuralgia     Comparison: None     Technique: Sagittal T1-weighted, sagittal T2-weighted, sagittal STIR,  sagittal diffusion weighted, axial T2-weighted, and axial T2* gradient  echo images of the cervical spine were obtained without intravenous  contrast.     Findings:  There is straightening of the cervical lordosis. Subtle grade 1  anterolisthesis of C4 on C5 and grade 1 anterolisthesis of C7 on T1.  Multilevel disc space height loss most pronounced at C5-6 where it is  moderate. There is normal signal within and normal contour of the  cervical spinal cord.  The findings on a level by level basis are as  follows:     C2-3:  Uncovertebral spurring and bilateral facet hypertrophy. No  significant spinal canal or neural foraminal stenosis.     C3-4: Right eccentric  disc osteophyte complex and right greater than  left uncovertebral spurring. Right greater than left facet  hypertrophy. Mild left and moderate right neural foraminal stenosis.  Mild spinal canal stenosis.      C4-5:  Right eccentric disc osteophyte complex and right greater than  left facet hypertrophy. Mild to moderate right neural foraminal  stenosis. No left neural foraminal stenosis. Mild-to-moderate spinal  canal stenosis..     C5-6: Disc osteophyte complex uncovertebral spurring. Mild bilateral  neuroforaminal stenosis. Moderate spinal canal stenosis.     C6-7: Disc osteophyte complex. Moderate spinal canal stenosis. Mild to  moderate bilateral neural stenosis.     C7-T1:  No spinal canal or neural foraminal stenosis.     No abnormality of the paraspinous soft tissues. Bilateral mastoid  effusions.                                                                      Impression:   Multilevel cervical spondylosis with moderate spinal canal stenosis at  C5-6 and C6-C7 and mild-to-moderate at C4-5. Moderate right neural  foraminal stenosis at C3-4.     I have personally reviewed the examination and initial interpretation  and I agree with the findings.     NORIS DONALDSON MD    Assessment:    ~Mild to moderate spinal canal stenosis at C5-6 and C6-C7 and  ~Moderate right neural foraminal stenosis at C3-4.  ~No myelopathic symptoms or signs/symptoms of cervical radiculopathy       Plan:  ~Continue with Neurology.    ~May benefit from Botox injection for occipital neualgia  ~Mailed Neck Exercise booklet for strengthening, stretching and range of motion exercises     (patient has done her own therapy in the past and had good benefit)  ~Unless she develops new pain or symptoms, she will be discharged from the Neurosurgery Clinic at this time, and can return on an as-needed basis.       At the end of the visit, all the patient's questions and concerns had been addressed and the patient was agreeable with the plan of  care as outlined above. The patient has our office contact information at [phone], and knows to call with any questions, concerns, or changes in condition.        Lashell Mcdonald DNP  Neurosurgery Nurse Practitioner  [facility] [place]  [phone]    Video-Visit Details    Type of service:  Video Visit    Video Duration:   21  Minutes     Originating Location (pt. Location): Home    Distant Location (provider location):  [facility] NEUROSURGERY     Platform used for Video Visit :BetyTagora

## 2020-07-22 ENCOUNTER — MYC MEDICAL ADVICE (OUTPATIENT)
Dept: FAMILY MEDICINE | Facility: CLINIC | Age: 75
End: 2020-07-22

## 2020-07-28 ENCOUNTER — MYC MEDICAL ADVICE (OUTPATIENT)
Dept: FAMILY MEDICINE | Facility: CLINIC | Age: 75
End: 2020-07-28

## 2020-08-02 ENCOUNTER — MYC MEDICAL ADVICE (OUTPATIENT)
Dept: FAMILY MEDICINE | Facility: CLINIC | Age: 75
End: 2020-08-02

## 2020-08-06 ENCOUNTER — MYC MEDICAL ADVICE (OUTPATIENT)
Dept: FAMILY MEDICINE | Facility: CLINIC | Age: 75
End: 2020-08-06

## 2020-08-24 ENCOUNTER — MYC MEDICAL ADVICE (OUTPATIENT)
Dept: FAMILY MEDICINE | Facility: CLINIC | Age: 75
End: 2020-08-24

## 2020-08-25 NOTE — TELEPHONE ENCOUNTER
PN,    Please see setObject message below.  Do you want to see patient or have her come in for just X-ray?    Enid Shin RN

## 2020-09-07 DIAGNOSIS — I10 HYPERTENSION GOAL BP (BLOOD PRESSURE) < 140/90: ICD-10-CM

## 2020-09-08 RX ORDER — LOSARTAN POTASSIUM 25 MG/1
TABLET ORAL
Qty: 90 TABLET | Refills: 1 | Status: SHIPPED | OUTPATIENT
Start: 2020-09-08 | End: 2021-02-17

## 2020-09-08 NOTE — TELEPHONE ENCOUNTER
"PN,    Routing refill request to provider for review/approval because:  Patient has been sending Mycharts with updated BP's since starting Losartan.    Thanks,  Enid Shin RN    Last Written Prescription Date:  7/14/2020  Last Fill Quantity: 30,  # refills: 1   Last office visit: 1/21/2020 with prescribing provider:  Yes, PN - wellness visit   Future Office Visit:        Requested Prescriptions   Pending Prescriptions Disp Refills     losartan (COZAAR) 25 MG tablet [Pharmacy Med Name: LOSARTAN 25MG TABLETS] 30 tablet 1     Sig: TAKE 1 TABLET(25 MG) BY MOUTH DAILY       Angiotensin-II Receptors Passed - 9/7/2020  3:22 AM        Passed - Last blood pressure under 140/90 in past 12 months     BP Readings from Last 3 Encounters:   07/06/20 119/72   02/27/20 (!) 171/60   01/21/20 134/64                 Passed - Recent (12 mo) or future (30 days) visit within the authorizing provider's specialty     Patient has had an office visit with the authorizing provider or a provider within the authorizing providers department within the previous 12 mos or has a future within next 30 days. See \"Patient Info\" tab in inbasket, or \"Choose Columns\" in Meds & Orders section of the refill encounter.              Passed - Medication is active on med list        Passed - Patient is age 18 or older        Passed - No active pregnancy on record        Passed - Normal serum creatinine on file in past 12 months     Recent Labs   Lab Test 01/21/20  1018   CR 0.84       Ok to refill medication if creatinine is low          Passed - Normal serum potassium on file in past 12 months     Recent Labs   Lab Test 01/21/20  1018   POTASSIUM 3.8                    Passed - No positive pregnancy test in past 12 months           "

## 2020-09-14 ENCOUNTER — TELEPHONE (OUTPATIENT)
Dept: FAMILY MEDICINE | Facility: CLINIC | Age: 75
End: 2020-09-14

## 2020-09-14 DIAGNOSIS — I10 HYPERTENSION GOAL BP (BLOOD PRESSURE) < 140/90: ICD-10-CM

## 2020-09-14 LAB
ALBUMIN SERPL-MCNC: 3.7 G/DL (ref 3.4–5)
ALP SERPL-CCNC: 78 U/L (ref 40–150)
ALT SERPL W P-5'-P-CCNC: 23 U/L (ref 0–50)
ANION GAP SERPL CALCULATED.3IONS-SCNC: 10 MMOL/L (ref 3–14)
AST SERPL W P-5'-P-CCNC: 17 U/L (ref 0–45)
BILIRUB SERPL-MCNC: 0.3 MG/DL (ref 0.2–1.3)
BUN SERPL-MCNC: 13 MG/DL (ref 7–30)
CALCIUM SERPL-MCNC: 9.2 MG/DL (ref 8.5–10.1)
CHLORIDE SERPL-SCNC: 105 MMOL/L (ref 94–109)
CO2 SERPL-SCNC: 23 MMOL/L (ref 20–32)
CREAT SERPL-MCNC: 0.86 MG/DL (ref 0.52–1.04)
GFR SERPL CREATININE-BSD FRML MDRD: 66 ML/MIN/{1.73_M2}
GLUCOSE SERPL-MCNC: 109 MG/DL (ref 70–99)
POTASSIUM SERPL-SCNC: 3.3 MMOL/L (ref 3.4–5.3)
PROT SERPL-MCNC: 8.3 G/DL (ref 6.8–8.8)
SODIUM SERPL-SCNC: 138 MMOL/L (ref 133–144)

## 2020-09-14 PROCEDURE — 36415 COLL VENOUS BLD VENIPUNCTURE: CPT | Performed by: FAMILY MEDICINE

## 2020-09-14 PROCEDURE — 80053 COMPREHEN METABOLIC PANEL: CPT | Performed by: FAMILY MEDICINE

## 2020-09-14 NOTE — TELEPHONE ENCOUNTER
Cherrie came to triage  Patient scheduled for xray only today (noted pt bringing in outside orders)  Per Cherrie we can only accept outside orders for labs, not xray, EKG, etc    PN noted in MyChart pt needed appt here to get order from us    Left message for patient to callback.  Eileen JORDAN RN

## 2020-09-15 NOTE — TELEPHONE ENCOUNTER
Explained to pt   Was told to schedule visit    Next 5 appointments (look out 90 days)    Sep 18, 2020  9:40 AM CDT  Office Visit with George Zhao PA-C  [facility] [address] [place]  [facility] [address]  [phone]        Eileen JORDAN RN

## 2020-09-15 NOTE — RESULT ENCOUNTER NOTE
Dear Harriet,   Your test results are all back -   -Liver and gallbladder tests (ALT,AST, Alk phos,bilirubin) are normal.  -Kidney function (GFR) is normal.  -Sodium is normal.  -Potassium is decreased.  ADVISE: rechecking this in 1 month.  -Calcium is normal.  -Glucose is mildly elevated but you were nonfasting and this is okay..  Let us know if you have any questions.  -Jolanta Wilkerson, DO

## 2020-09-18 ENCOUNTER — OFFICE VISIT (OUTPATIENT)
Dept: FAMILY MEDICINE | Facility: CLINIC | Age: 75
End: 2020-09-18
Payer: COMMERCIAL

## 2020-09-18 ENCOUNTER — ANCILLARY PROCEDURE (OUTPATIENT)
Dept: GENERAL RADIOLOGY | Facility: CLINIC | Age: 75
End: 2020-09-18
Attending: PHYSICIAN ASSISTANT
Payer: COMMERCIAL

## 2020-09-18 VITALS
HEIGHT: 65 IN | HEART RATE: 60 BPM | SYSTOLIC BLOOD PRESSURE: 104 MMHG | DIASTOLIC BLOOD PRESSURE: 69 MMHG | WEIGHT: 194 LBS | OXYGEN SATURATION: 98 % | RESPIRATION RATE: 16 BRPM | BODY MASS INDEX: 32.32 KG/M2

## 2020-09-18 DIAGNOSIS — Z23 NEED FOR VACCINATION: ICD-10-CM

## 2020-09-18 DIAGNOSIS — Z96.642 STATUS POST LEFT HIP REPLACEMENT: Primary | ICD-10-CM

## 2020-09-18 PROCEDURE — G0008 ADMIN INFLUENZA VIRUS VAC: HCPCS | Performed by: PHYSICIAN ASSISTANT

## 2020-09-18 PROCEDURE — 99213 OFFICE O/P EST LOW 20 MIN: CPT | Performed by: PHYSICIAN ASSISTANT

## 2020-09-18 PROCEDURE — 73502 X-RAY EXAM HIP UNI 2-3 VIEWS: CPT | Mod: FY

## 2020-09-18 PROCEDURE — 90662 IIV NO PRSV INCREASED AG IM: CPT | Performed by: PHYSICIAN ASSISTANT

## 2020-09-18 ASSESSMENT — MIFFLIN-ST. JEOR: SCORE: 1372.92

## 2020-09-18 NOTE — PROGRESS NOTES
"Subjective     Harriet Lance is a 75 year old female who presents to clinic today for the following health issues:    HPI     Pt states she is here to discuss with provider possible order of x-ray  Ordered by [place]     She has been doing well with her hip, just needs yearly xray per her ortho in [place].  We have order.    Due for flu shot.            Review of Systems   Constitutional, HEENT, cardiovascular, pulmonary, gi and gu systems are negative, except as otherwise noted.      Objective    /69   Pulse 60   Resp 16   Ht 1.638 m (5' 4.5\")   Wt 88 kg (194 lb)   SpO2 98%   BMI 32.79 kg/m    Body mass index is 32.79 kg/m .  Physical Exam   GENERAL: alert and no distress  EYES: Eyes grossly normal to inspection  PSYCH: mentation appears normal, affect normal/bright    Results for orders placed or performed in visit on 09/18/20   XR Pelvis and Hip Left 1 View     Status: None    Narrative    XR PELVIS AND HIP LEFT 1 VIEW 9/18/2020 12:03 PM     HISTORY: Status post left hip replacement    COMPARISON: 6/6/2019      Impression    IMPRESSION: Bilateral KAZ. Components are well seated. No fracture or  osseous lesion. Findings are stable in the interval.    ROCHELLE TAYLOR MD           Assessment & Plan     Status post left hip replacement  Will forward to [place]  - XR Pelvis and Hip Left 1 View    Need for vaccination    - HC FLU VACCINE, INCREASED ANTIGEN, PRESV FREE  - ADMIN 1st VACCINE           No follow-ups on file.    George Zhao PA-C  [facility]    "

## 2020-09-21 NOTE — RESULT ENCOUNTER NOTE
Dear Harriet    Your test results are attached, feel free to contact me via Medminder[facility]     Everything looks just fine on your xray, Ill make sure [facility] gets a copy.    Rony Zhao PA-C

## 2020-09-27 ASSESSMENT — ENCOUNTER SYMPTOMS: BREAST PAIN: 1

## 2020-09-28 ENCOUNTER — OFFICE VISIT (OUTPATIENT)
Dept: NEUROLOGY | Facility: CLINIC | Age: 75
End: 2020-09-28
Payer: COMMERCIAL

## 2020-09-28 VITALS
BODY MASS INDEX: 32.32 KG/M2 | RESPIRATION RATE: 16 BRPM | SYSTOLIC BLOOD PRESSURE: 112 MMHG | DIASTOLIC BLOOD PRESSURE: 73 MMHG | HEART RATE: 56 BPM | HEIGHT: 65 IN | OXYGEN SATURATION: 98 % | WEIGHT: 194 LBS

## 2020-09-28 DIAGNOSIS — M54.2 CERVICALGIA: Primary | ICD-10-CM

## 2020-09-28 ASSESSMENT — MIFFLIN-ST. JEOR: SCORE: 1372.92

## 2020-09-28 ASSESSMENT — PAIN SCALES - GENERAL: PAINLEVEL: NO PAIN (0)

## 2020-09-28 NOTE — PROGRESS NOTES
[facility] Neurology Follow Up Visit    Harriet Lance MRN# [id number]   Age: 75 year old YOB: 1945     Brief history of symptoms: The patient was initially seen in neurologic consultation on 2/27/2020 and for follow up on 7/6/2020 for evaluation of facial and sensory changes. Please see the comprehensive neurologic consultation note from that date in the Epic records for details. Impression initially was for right occipital neuralgia, with MRI brain and cervical spine showing multiple levels cervical spondylosis and moderate spinal canal stenosis at C5-7 and moderate right neural foraminal stenosis at C3-4.  Upon most recent follow up, the patient was still having head pain with tingling on her right, as well as tightness with pain in her right shoulder/neck/upper arm.  She was to follow with neurosurgery and have consideration toward EMG/nerve block for occipital neuralgia or cervical neuralgia in the future pending evaluation.  She was seen on 7/20/2020 with Neurosurgery and no surgical intervention was recommended.     Interval history: The patient reports that her pain is lessened since getting her blood pressure under control.  Averaged over two weeks, she is having about one episode of right side (temporal-occipital) pain which lasts under a few minutes and isn't triggered by any specific movement or light/sound.  Her right sided lower neck pain is all but gone (stiffness and ache in neck).  There are no new symptoms otherwise to report.      Past Medical History:     Past Medical History:   Diagnosis Date     Allergic rhinitis, cause unspecified      Chronic renal disease     normal creatinine since 2011     Colon cancer (H) 1998    chemotherapy     Diffuse cystic mastopathy     calcifications on the right breast - followed closely     Hypertension      Personal history of malignant neoplasm of large intestine 1998    had chemotherapy after colon resection -      Unspecified glaucoma(365.9) 2005     "Early - followed by  Opthamologist - both eyes      Medications:     Current Outpatient Medications   Medication Sig     amLODIPine (NORVASC) 10 MG tablet Take 1 tablet (10 mg) by mouth daily     ASPIRIN 81 MG OR TABS 1 tab po QD (Once per day)     Coenzyme Q10 (CO Q10) 100 MG CAPS      FISH OIL 1000 MG OR CAPS 2 capsules once per day     hydrochlorothiazide (HYDRODIURIL) 25 MG tablet TAKE 1 TABLET(25 MG) BY MOUTH DAILY     LORazepam (ATIVAN) 1 MG tablet Take 1 tablet (1 mg) by mouth every 6 hours as needed for anxiety     losartan (COZAAR) 25 MG tablet TAKE 1 TABLET(25 MG) BY MOUTH DAILY     metoprolol succinate ER (TOPROL-XL) 50 MG 24 hr tablet Take 50mg in the AM and 50mg in the PM     Multiple Vitamins-Minerals (I-DARBY) TABS Take 2 tablets by mouth daily     pravastatin (PRAVACHOL) 40 MG tablet TAKE 1 TABLET(40 MG) BY MOUTH DAILY     vitamin D3 (CHOLECALCIFEROL) 2000 units tablet Take 1 tablet by mouth daily     ZYRTEC 10 MG OR TABS 1 TABLET DAILY      Review of Systems:   A comprehensive 10 point review of systems (constitutional, ENT, cardiac, peripheral vascular, lymphatic, respiratory, GI, , Musculoskeletal, skin, Neurological) was performed and found to be negative except as described in this note.      Physical Exam:   Vitals: /73   Pulse 56   Resp 16   Ht 1.638 m (5' 4.5\")   Wt 88 kg (194 lb)   SpO2 98%   BMI 32.79 kg/m     General: Seated comfortably in no acute distress. Very well appearing, well dressed, serge disposition.  HEENT: Neck supple with normal range of motion.   Skin: No rashes  Neurologic:     Mental Status: Fully alert, attentive and oriented. Speech clear and fluent, no paraphasic errors.     Cranial Nerves: EOMI with normal smooth pursuit. Facial movements symmetric. Hearing not formally tested but intact to conversation.  No dysarthria.     Motor: No tremors or other abnormal movements observed.      Sensory:Negative Romberg.      Coordination: Finger-nose-finger " without dysmetria.     Gait: Normal, steady casual gait.         Assessment and Plan:   Assessment:  Musculoskeletal neck pain leading to worsened headaches    The patient is doing quite well after using some of the stretches recommended through Neurosurgery.  She has no signs indicating a radiculopathy of the arm, and her neck pain is all but resolved. Her headaches on the left side remain, but have been decreasing in severity and frequency with resolution of the neck pain.  At this point, use of ibuprofen may help the headaches she has but I wouldn't necessarily start a prophylactic mediation given the reduced frequency and severity of her pain.  She may benefit from massage therapy.  I discussed with the patient that she could follow up as needed or sooner, and to contact me if she developed any shooting pain the arm or neck/weakness of arm or neck/sensory changes of arm or neck, or increased frequency/change in headaches.     Plan:  Massage therapy    Follow up in Neurology clinic as needed should new concerns arise.    ОЛЬГА Méndez D.O.   of Neurology      Greater than 50% of the total time (14 min) in this patient encounter was spent on counseling and/or coordination of care. We reviewed diagnostic results, impressions, and discussed other possible tests if symptoms do not improve. We discussed the implications of the diagnosis, as well as risks and benefits of management options. We reviewed treatment instructions and our scheduled follow-up as specified in the discharge plan. We also discussed the importance of compliance with the chosen course of treatment. The patient is in agreement with this plan and has no further questions.

## 2020-09-28 NOTE — LETTER
9/28/2020       RE: Harriet Lance  [address] [facility] [address]     Dear Colleague,    Thank you for referring your patient, Harriet Lance, to the [facility] NEUROLOGY at [facility]. Please see a copy of my visit note below.    [facility] Neurology Follow Up Visit    Harriet Lance MRN# [id number]   Age: 75 year old YOB: 1945     Brief history of symptoms: The patient was initially seen in neurologic consultation on 2/27/2020 and for follow up on 7/6/2020 for evaluation of facial and sensory changes. Please see the comprehensive neurologic consultation note from that date in the Epic records for details. Impression initially was for right occipital neuralgia, with MRI brain and cervical spine showing multiple levels cervical spondylosis and moderate spinal canal stenosis at C5-7 and moderate right neural foraminal stenosis at C3-4.  Upon most recent follow up, the patient was still having head pain with tingling on her right, as well as tightness with pain in her right shoulder/neck/upper arm.  She was to follow with neurosurgery and have consideration toward EMG/nerve block for occipital neuralgia or cervical neuralgia in the future pending evaluation.  She was seen on 7/20/2020 with Neurosurgery and no surgical intervention was recommended.     Interval history: The patient reports that her pain is lessened since getting her blood pressure under control.  Averaged over two weeks, she is having about one episode of right side (temporal-occipital) pain which lasts under a few minutes and isn't triggered by any specific movement or light/sound.  Her right sided lower neck pain is all but gone (stiffness and ache in neck).  There are no new symptoms otherwise to report.      Past Medical History:     Past Medical History:   Diagnosis Date     Allergic rhinitis, cause unspecified      Chronic renal disease     normal creatinine since 2011     Colon  "cancer (H) 1998    chemotherapy     Diffuse cystic mastopathy     calcifications on the right breast - followed closely     Hypertension      Personal history of malignant neoplasm of large intestine 1998    had chemotherapy after colon resection -      Unspecified glaucoma(365.9) 2005    Early - followed by  Opthamologist - both eyes      Medications:     Current Outpatient Medications   Medication Sig     amLODIPine (NORVASC) 10 MG tablet Take 1 tablet (10 mg) by mouth daily     ASPIRIN 81 MG OR TABS 1 tab po QD (Once per day)     Coenzyme Q10 (CO Q10) 100 MG CAPS      FISH OIL 1000 MG OR CAPS 2 capsules once per day     hydrochlorothiazide (HYDRODIURIL) 25 MG tablet TAKE 1 TABLET(25 MG) BY MOUTH DAILY     LORazepam (ATIVAN) 1 MG tablet Take 1 tablet (1 mg) by mouth every 6 hours as needed for anxiety     losartan (COZAAR) 25 MG tablet TAKE 1 TABLET(25 MG) BY MOUTH DAILY     metoprolol succinate ER (TOPROL-XL) 50 MG 24 hr tablet Take 50mg in the AM and 50mg in the PM     Multiple Vitamins-Minerals (I-DARBY) TABS Take 2 tablets by mouth daily     pravastatin (PRAVACHOL) 40 MG tablet TAKE 1 TABLET(40 MG) BY MOUTH DAILY     vitamin D3 (CHOLECALCIFEROL) 2000 units tablet Take 1 tablet by mouth daily     ZYRTEC 10 MG OR TABS 1 TABLET DAILY      Review of Systems:   A comprehensive 10 point review of systems (constitutional, ENT, cardiac, peripheral vascular, lymphatic, respiratory, GI, , Musculoskeletal, skin, Neurological) was performed and found to be negative except as described in this note.      Physical Exam:   Vitals: /73   Pulse 56   Resp 16   Ht 1.638 m (5' 4.5\")   Wt 88 kg (194 lb)   SpO2 98%   BMI 32.79 kg/m     General: Seated comfortably in no acute distress. Very well appearing, well dressed, serge disposition.  HEENT: Neck supple with normal range of motion.   Skin: No rashes  Neurologic:     Mental Status: Fully alert, attentive and oriented. Speech clear and fluent, no paraphasic " errors.     Cranial Nerves: EOMI with normal smooth pursuit. Facial movements symmetric. Hearing not formally tested but intact to conversation.  No dysarthria.     Motor: No tremors or other abnormal movements observed.      Sensory:Negative Romberg.      Coordination: Finger-nose-finger without dysmetria.     Gait: Normal, steady casual gait.           Assessment and Plan:   Assessment:  Musculoskeletal neck pain leading to worsened headaches    The patient is doing quite well after using some of the stretches recommended through Neurosurgery.  She has no signs indicating a radiculopathy of the arm, and her neck pain is all but resolved. Her headaches on the left side remain, but have been decreasing in severity and frequency with resolution of the neck pain.  At this point, use of ibuprofen may help the headaches she has but I wouldn't necessarily start a prophylactic mediation given the reduced frequency and severity of her pain.  She may benefit from massage therapy.  I discussed with the patient that she could follow up as needed or sooner, and to contact me if she developed any shooting pain the arm or neck/weakness of arm or neck/sensory changes of arm or neck, or increased frequency/change in headaches.     Plan:  Massage therapy    Follow up in Neurology clinic as needed should new concerns arise.      Greater than 50% of the total time (14 min) in this patient encounter was spent on counseling and/or coordination of care. We reviewed diagnostic results, impressions, and discussed other possible tests if symptoms do not improve. We discussed the implications of the diagnosis, as well as risks and benefits of management options. We reviewed treatment instructions and our scheduled follow-up as specified in the discharge plan. We also discussed the importance of compliance with the chosen course of treatment. The patient is in agreement with this plan and has no further questions.      Again, thank you for  allowing me to participate in the care of your patient.  Sincerely,    ОЛЬГА Méndez D.O.   of Neurology

## 2020-09-28 NOTE — LETTER
9/28/2020       RE: Harriet Lance  [address] [facility] [address]     Dear Colleague,    Thank you for referring your patient, Harriet Lance, to the [facility] NEUROLOGY at [facility]. Please see a copy of my visit note below.    [facility] Neurology Follow Up Visit    Harriet Lance MRN# [id number]   Age: 75 year old YOB: 1945     Brief history of symptoms: The patient was initially seen in neurologic consultation on 2/27/2020 and for follow up on 7/6/2020 for evaluation of facial and sensory changes. Please see the comprehensive neurologic consultation note from that date in the Epic records for details. Impression initially was for right occipital neuralgia, with MRI brain and cervical spine showing multiple levels cervical spondylosis and moderate spinal canal stenosis at C5-7 and moderate right neural foraminal stenosis at C3-4.  Upon most recent follow up, the patient was still having head pain with tingling on her right, as well as tightness with pain in her right shoulder/neck/upper arm.  She was to follow with neurosurgery and have consideration toward EMG/nerve block for occipital neuralgia or cervical neuralgia in the future pending evaluation.  She was seen on 7/20/2020 with Neurosurgery and no surgical intervention was recommended.     Interval history: The patient reports that her pain is lessened since getting her blood pressure under control.  Averaged over two weeks, she is having about one episode of right side (temporal-occipital) pain which lasts under a few minutes and isn't triggered by any specific movement or light/sound.  Her right sided lower neck pain is all but gone (stiffness and ache in neck).  There are no new symptoms otherwise to report.      Past Medical History:     Past Medical History:   Diagnosis Date     Allergic rhinitis, cause unspecified      Chronic renal disease     normal creatinine since 2011     Colon  "cancer (H) 1998    chemotherapy     Diffuse cystic mastopathy     calcifications on the right breast - followed closely     Hypertension      Personal history of malignant neoplasm of large intestine 1998    had chemotherapy after colon resection -      Unspecified glaucoma(365.9) 2005    Early - followed by  Opthamologist - both eyes      Medications:     Current Outpatient Medications   Medication Sig     amLODIPine (NORVASC) 10 MG tablet Take 1 tablet (10 mg) by mouth daily     ASPIRIN 81 MG OR TABS 1 tab po QD (Once per day)     Coenzyme Q10 (CO Q10) 100 MG CAPS      FISH OIL 1000 MG OR CAPS 2 capsules once per day     hydrochlorothiazide (HYDRODIURIL) 25 MG tablet TAKE 1 TABLET(25 MG) BY MOUTH DAILY     LORazepam (ATIVAN) 1 MG tablet Take 1 tablet (1 mg) by mouth every 6 hours as needed for anxiety     losartan (COZAAR) 25 MG tablet TAKE 1 TABLET(25 MG) BY MOUTH DAILY     metoprolol succinate ER (TOPROL-XL) 50 MG 24 hr tablet Take 50mg in the AM and 50mg in the PM     Multiple Vitamins-Minerals (I-DARBY) TABS Take 2 tablets by mouth daily     pravastatin (PRAVACHOL) 40 MG tablet TAKE 1 TABLET(40 MG) BY MOUTH DAILY     vitamin D3 (CHOLECALCIFEROL) 2000 units tablet Take 1 tablet by mouth daily     ZYRTEC 10 MG OR TABS 1 TABLET DAILY      Review of Systems:   A comprehensive 10 point review of systems (constitutional, ENT, cardiac, peripheral vascular, lymphatic, respiratory, GI, , Musculoskeletal, skin, Neurological) was performed and found to be negative except as described in this note.      Physical Exam:   Vitals: /73   Pulse 56   Resp 16   Ht 1.638 m (5' 4.5\")   Wt 88 kg (194 lb)   SpO2 98%   BMI 32.79 kg/m     General: Seated comfortably in no acute distress. Very well appearing, well dressed, serge disposition.  HEENT: Neck supple with normal range of motion.   Skin: No rashes  Neurologic:     Mental Status: Fully alert, attentive and oriented. Speech clear and fluent, no paraphasic " errors.     Cranial Nerves: EOMI with normal smooth pursuit. Facial movements symmetric. Hearing not formally tested but intact to conversation.  No dysarthria.     Motor: No tremors or other abnormal movements observed.      Sensory:Negative Romberg.      Coordination: Finger-nose-finger without dysmetria.     Gait: Normal, steady casual gait.         Assessment and Plan:   Assessment:  Musculoskeletal neck pain leading to worsened headaches    The patient is doing quite well after using some of the stretches recommended through Neurosurgery.  She has no signs indicating a radiculopathy of the arm, and her neck pain is all but resolved. Her headaches on the left side remain, but have been decreasing in severity and frequency with resolution of the neck pain.  At this point, use of ibuprofen may help the headaches she has but I wouldn't necessarily start a prophylactic mediation given the reduced frequency and severity of her pain.  She may benefit from massage therapy.  I discussed with the patient that she could follow up as needed or sooner, and to contact me if she developed any shooting pain the arm or neck/weakness of arm or neck/sensory changes of arm or neck, or increased frequency/change in headaches.     Plan:  Massage therapy    Follow up in Neurology clinic as needed should new concerns arise.    ОЛЬГА Méndez D.O.   of Neurology      Greater than 50% of the total time (14 min) in this patient encounter was spent on counseling and/or coordination of care. We reviewed diagnostic results, impressions, and discussed other possible tests if symptoms do not improve. We discussed the implications of the diagnosis, as well as risks and benefits of management options. We reviewed treatment instructions and our scheduled follow-up as specified in the discharge plan. We also discussed the importance of compliance with the chosen course of treatment. The patient is in agreement with this  plan and has no further questions.      Again, thank you for allowing me to participate in the care of your patient.      Sincerely,    Nathan Méndez, DO

## 2020-09-28 NOTE — NURSING NOTE
Chief Complaint   Patient presents with     RECHECK     [facility] RETURN 2 MONTH F/U       Ozzie Walters, EMT

## 2020-10-20 ENCOUNTER — MYC REFILL (OUTPATIENT)
Dept: FAMILY MEDICINE | Facility: CLINIC | Age: 75
End: 2020-10-20

## 2020-10-20 DIAGNOSIS — I10 HYPERTENSION GOAL BP (BLOOD PRESSURE) < 140/90: ICD-10-CM

## 2020-10-21 RX ORDER — METOPROLOL SUCCINATE 50 MG/1
TABLET, EXTENDED RELEASE ORAL
Qty: 180 TABLET | Refills: 1 | Status: SHIPPED | OUTPATIENT
Start: 2020-10-21 | End: 2021-01-25

## 2020-10-21 NOTE — TELEPHONE ENCOUNTER
Routing refill request to provider for review/approval because:  Medication is reported/historical  Eileen JORDAN RN

## 2020-11-01 ENCOUNTER — MYC MEDICAL ADVICE (OUTPATIENT)
Dept: FAMILY MEDICINE | Facility: CLINIC | Age: 75
End: 2020-11-01

## 2020-11-01 DIAGNOSIS — N64.4 MASTODYNIA: Primary | ICD-10-CM

## 2020-11-03 NOTE — TELEPHONE ENCOUNTER
I can order a diagnostic mammogram if I know where her pain is -   We can start with that and they can do an ultrasound and the time to look for cysts.  Thanks  PN

## 2020-11-16 ENCOUNTER — ANCILLARY PROCEDURE (OUTPATIENT)
Dept: MAMMOGRAPHY | Facility: CLINIC | Age: 75
End: 2020-11-16
Payer: COMMERCIAL

## 2020-11-16 DIAGNOSIS — N64.4 MASTODYNIA: ICD-10-CM

## 2020-11-16 PROCEDURE — 77066 DX MAMMO INCL CAD BI: CPT | Performed by: RADIOLOGY

## 2020-11-16 PROCEDURE — 76642 ULTRASOUND BREAST LIMITED: CPT | Mod: RT | Performed by: RADIOLOGY

## 2020-11-16 PROCEDURE — G0279 TOMOSYNTHESIS, MAMMO: HCPCS | Performed by: RADIOLOGY

## 2020-12-30 ENCOUNTER — MYC MEDICAL ADVICE (OUTPATIENT)
Dept: FAMILY MEDICINE | Facility: CLINIC | Age: 75
End: 2020-12-30

## 2021-01-05 ENCOUNTER — MYC MEDICAL ADVICE (OUTPATIENT)
Dept: FAMILY MEDICINE | Facility: CLINIC | Age: 76
End: 2021-01-05

## 2021-01-17 ENCOUNTER — MYC MEDICAL ADVICE (OUTPATIENT)
Dept: FAMILY MEDICINE | Facility: CLINIC | Age: 76
End: 2021-01-17

## 2021-01-24 ASSESSMENT — ACTIVITIES OF DAILY LIVING (ADL): CURRENT_FUNCTION: NO ASSISTANCE NEEDED

## 2021-01-26 ENCOUNTER — OFFICE VISIT (OUTPATIENT)
Dept: FAMILY MEDICINE | Facility: CLINIC | Age: 76
End: 2021-01-26
Payer: COMMERCIAL

## 2021-01-26 VITALS
SYSTOLIC BLOOD PRESSURE: 129 MMHG | HEART RATE: 50 BPM | TEMPERATURE: 98.5 F | OXYGEN SATURATION: 98 % | DIASTOLIC BLOOD PRESSURE: 72 MMHG | HEIGHT: 65 IN | WEIGHT: 194.4 LBS | BODY MASS INDEX: 32.39 KG/M2 | RESPIRATION RATE: 15 BRPM

## 2021-01-26 DIAGNOSIS — I10 HYPERTENSION GOAL BP (BLOOD PRESSURE) < 140/90: ICD-10-CM

## 2021-01-26 DIAGNOSIS — E78.5 HYPERLIPIDEMIA WITH TARGET LDL LESS THAN 130: ICD-10-CM

## 2021-01-26 DIAGNOSIS — R73.09 ELEVATED GLUCOSE: ICD-10-CM

## 2021-01-26 DIAGNOSIS — R30.0 DYSURIA: ICD-10-CM

## 2021-01-26 DIAGNOSIS — Z00.00 ENCOUNTER FOR MEDICARE ANNUAL WELLNESS EXAM: Primary | ICD-10-CM

## 2021-01-26 LAB
ALBUMIN SERPL-MCNC: 3.7 G/DL (ref 3.4–5)
ALBUMIN UR-MCNC: NEGATIVE MG/DL
ALP SERPL-CCNC: 68 U/L (ref 40–150)
ALT SERPL W P-5'-P-CCNC: 26 U/L (ref 0–50)
ANION GAP SERPL CALCULATED.3IONS-SCNC: 2 MMOL/L (ref 3–14)
APPEARANCE UR: CLEAR
AST SERPL W P-5'-P-CCNC: 19 U/L (ref 0–45)
BACTERIA #/AREA URNS HPF: ABNORMAL /HPF
BILIRUB SERPL-MCNC: 0.2 MG/DL (ref 0.2–1.3)
BILIRUB UR QL STRIP: NEGATIVE
BUN SERPL-MCNC: 13 MG/DL (ref 7–30)
CALCIUM SERPL-MCNC: 10.1 MG/DL (ref 8.5–10.1)
CHLORIDE SERPL-SCNC: 109 MMOL/L (ref 94–109)
CHOLEST SERPL-MCNC: 145 MG/DL
CO2 SERPL-SCNC: 30 MMOL/L (ref 20–32)
COLOR UR AUTO: YELLOW
CREAT SERPL-MCNC: 1 MG/DL (ref 0.52–1.04)
CREAT UR-MCNC: 93 MG/DL
GFR SERPL CREATININE-BSD FRML MDRD: 55 ML/MIN/{1.73_M2}
GLUCOSE SERPL-MCNC: 94 MG/DL (ref 70–99)
GLUCOSE UR STRIP-MCNC: NEGATIVE MG/DL
HBA1C MFR BLD: 5.8 % (ref 0–5.6)
HDLC SERPL-MCNC: 55 MG/DL
HGB UR QL STRIP: ABNORMAL
HYALINE CASTS #/AREA URNS LPF: ABNORMAL /LPF
KETONES UR STRIP-MCNC: NEGATIVE MG/DL
LDLC SERPL CALC-MCNC: 61 MG/DL
LEUKOCYTE ESTERASE UR QL STRIP: ABNORMAL
MICROALBUMIN UR-MCNC: 23 MG/L
MICROALBUMIN/CREAT UR: 24.65 MG/G CR (ref 0–25)
NITRATE UR QL: NEGATIVE
NON-SQ EPI CELLS #/AREA URNS LPF: ABNORMAL /LPF
NONHDLC SERPL-MCNC: 90 MG/DL
PH UR STRIP: 5.5 PH (ref 5–7)
POTASSIUM SERPL-SCNC: 3.8 MMOL/L (ref 3.4–5.3)
PROT SERPL-MCNC: 8.1 G/DL (ref 6.8–8.8)
RBC #/AREA URNS AUTO: ABNORMAL /HPF
SODIUM SERPL-SCNC: 141 MMOL/L (ref 133–144)
SOURCE: ABNORMAL
SP GR UR STRIP: 1.01 (ref 1–1.03)
TRIGL SERPL-MCNC: 147 MG/DL
UROBILINOGEN UR STRIP-ACNC: 0.2 EU/DL (ref 0.2–1)
WBC #/AREA URNS AUTO: ABNORMAL /HPF

## 2021-01-26 PROCEDURE — 36415 COLL VENOUS BLD VENIPUNCTURE: CPT | Performed by: FAMILY MEDICINE

## 2021-01-26 PROCEDURE — 99397 PER PM REEVAL EST PAT 65+ YR: CPT | Performed by: FAMILY MEDICINE

## 2021-01-26 PROCEDURE — 81001 URINALYSIS AUTO W/SCOPE: CPT | Performed by: FAMILY MEDICINE

## 2021-01-26 PROCEDURE — 87086 URINE CULTURE/COLONY COUNT: CPT | Performed by: FAMILY MEDICINE

## 2021-01-26 PROCEDURE — 80053 COMPREHEN METABOLIC PANEL: CPT | Performed by: FAMILY MEDICINE

## 2021-01-26 PROCEDURE — 80061 LIPID PANEL: CPT | Performed by: FAMILY MEDICINE

## 2021-01-26 PROCEDURE — 83036 HEMOGLOBIN GLYCOSYLATED A1C: CPT | Performed by: FAMILY MEDICINE

## 2021-01-26 PROCEDURE — 82043 UR ALBUMIN QUANTITATIVE: CPT | Performed by: FAMILY MEDICINE

## 2021-01-26 ASSESSMENT — MIFFLIN-ST. JEOR: SCORE: 1374.73

## 2021-01-26 ASSESSMENT — ACTIVITIES OF DAILY LIVING (ADL): CURRENT_FUNCTION: NO ASSISTANCE NEEDED

## 2021-01-26 NOTE — PATIENT INSTRUCTIONS
Patient Education   Personalized Prevention Plan  You are due for the preventive services outlined below.  Your care team is available to assist you in scheduling these services.  If you have already completed any of these items, please share that information with your care team to update in your medical record.  Health Maintenance Due   Topic Date Due     FALL RISK ASSESSMENT  01/21/2021     Annual Wellness Visit  01/21/2021

## 2021-01-26 NOTE — PROGRESS NOTES
"SUBJECTIVE:   Harriet Lance is a 75 year old female who presents for Preventive Visit.      Patient has been advised of split billing requirements and indicates understanding: Yes   Are you in the first 12 months of your Medicare coverage?  No    Healthy Habits:     In general, how would you rate your overall health?  Good    Frequency of exercise:  2-3 days/week    Duration of exercise:  15-30 minutes    Do you usually eat at least 4 servings of fruit and vegetables a day, include whole grains    & fiber and avoid regularly eating high fat or \"junk\" foods?  Yes    Ability to successfully perform activities of daily living:  No assistance needed    Home Safety:  No safety concerns identified    Hearing Impairment:  Difficulty understanding soft or whispered speech    In the past 6 months, have you been bothered by leaking of urine?  No    In general, how would you rate your overall mental or emotional health?  Excellent      PHQ-2 Total Score: 0    Additional concerns today:  Yes    Do you feel safe in your environment? Yes    Have you ever done Advance Care Planning? (For example, a Health Directive, POLST, or a discussion with a medical provider or your loved ones about your wishes):        Fall risk       Cognitive Screening   1) Repeat 3 items (Leader, Season, Table)    2) Clock draw: NORMAL  3) 3 item recall: Recalls 3 objects  Results: 3 items recalled: COGNITIVE IMPAIRMENT LESS LIKELY    Mini-CogTM Copyright CARTER Randhawa. Licensed by the author for use in [facility]; reprinted with permission (stefan@.[facility]). All rights reserved.      Do you have sleep apnea, excessive snoring or daytime drowsiness?: pt has been told that she snores; doesn't always get good sleep.    Reviewed and updated as needed this visit by clinical staff  Tobacco  Allergies  Meds  Problems  Med Hx  Surg Hx  Fam Hx          Reviewed and updated as needed this visit by Provider                Social History     Tobacco " Use     Smoking status: Never Smoker     Smokeless tobacco: Never Used   Substance Use Topics     Alcohol use: Never     Alcohol/week: 0.0 standard drinks     Comment: rarely     If you drink alcohol do you typically have >3 drinks per day or >7 drinks per week? No    Alcohol Use 1/24/2021   Prescreen: >3 drinks/day or >7 drinks/week? No   Prescreen: >3 drinks/day or >7 drinks/week? -   No flowsheet data found.        -------------------------------------    Current providers sharing in care for this patient include:    Patient Care Team:  Jolanta Wilkerson DO as PCP - General  Amaury Ledesma MD as MD (Family Medicine - Sports Medicine)  Jolanta Wilkerson DO as Assigned PCP  Balbina Aguilar MD as MD (Ophthalmology)  Nathan Méndez DO as Assigned Neuroscience Provider  Balbina Aguilar MD as Assigned Surgical Provider    The following health maintenance items are reviewed in Epic and correct as of today:  Health Maintenance   Topic Date Due     FALL RISK ASSESSMENT  01/21/2021     MEDICARE ANNUAL WELLNESS VISIT  01/26/2022     COLORECTAL CANCER SCREENING  10/02/2022     LIPID  01/21/2025     ADVANCE CARE PLANNING  01/21/2025     DTAP/TDAP/TD IMMUNIZATION (5 - Td) 12/15/2027     DEXA  01/07/2031     HEPATITIS C SCREENING  Completed     PHQ-2  Completed     INFLUENZA VACCINE  Completed     Pneumococcal Vaccine: 65+ Years  Completed     ZOSTER IMMUNIZATION  Completed     Pneumococcal Vaccine: Pediatrics (0 to 5 Years) and At-Risk Patients (6 to 64 Years)  Aged Out     IPV IMMUNIZATION  Aged Out     MENINGITIS IMMUNIZATION  Aged Out     HEPATITIS B IMMUNIZATION  Aged Out     Patient Active Problem List   Diagnosis     Allergic rhinitis     Diffuse cystic mastopathy     History of malignant neoplasm of large intestine     Enlarged lymph nodes     CARDIOVASCULAR SCREENING; LDL GOAL LESS THAN 100     Borderline glaucoma (glaucoma suspect)     Hypertension goal BP (blood pressure)  "< 140/90     Advanced directives, counseling/discussion     Arthritis of hip     HL (hearing loss)     Hyperlipidemia with target LDL less than 130     BMI 37.0-37.9, adult     Benign neoplasm of colon     Past Surgical History:   Procedure Laterality Date     AS TOTAL HIP ARTHROPLASTY Right 2017    done at [facility]     C REMOVAL COLON/ILEOSTOMY      8 inches of colon removed - not clear what section       Social History     Tobacco Use     Smoking status: Never Smoker     Smokeless tobacco: Never Used   Substance Use Topics     Alcohol use: Never     Alcohol/week: 0.0 standard drinks     Comment: rarely     Family History   Problem Relation Age of Onset     Heart Disease Mother          of heart Attack at age 75     Hypertension Mother      Cancer Father         Prostate     Diabetes Father         Type II     Prostate Cancer Father      Allergies Father      Cancer - colorectal Maternal Grandmother         Colon Resection     Breast Cancer Maternal Grandmother         Mastectomy     Cancer - colorectal Maternal Uncle      Glaucoma No family hx of      Macular Degeneration No family hx of          Mammogram Screening: Recommended mammography every 1-2 years with patient discussion and risk factor consideration    Review of Systems  Constitutional, HEENT, cardiovascular, pulmonary, GI, , musculoskeletal, neuro, skin, endocrine and psych systems are negative, except as otherwise noted.    OBJECTIVE:   There were no vitals taken for this visit. Estimated body mass index is 32.79 kg/m  as calculated from the following:    Height as of 20: 1.638 m (5' 4.5\").    Weight as of 20: 88 kg (194 lb).  Physical Exam  GENERAL APPEARANCE: healthy, alert and no distress  EYES: Eyes grossly normal to inspection, PERRL and conjunctivae and sclerae normal  HENT: both ears: occluded with wax   NECK: no adenopathy, no asymmetry, masses, or scars and thyroid normal to palpation  RESP: lungs clear to " "auscultation - no rales, rhonchi or wheezes  BREAST: normal without masses, tenderness or nipple discharge and no palpable axillary masses or adenopathy  CV: regular rate and rhythm, normal S1 S2, no S3 or S4, no murmur, click or rub, no peripheral edema and peripheral pulses strong  ABDOMEN: soft, nontender, no hepatosplenomegaly, no masses and bowel sounds normal  MS: no musculoskeletal defects are noted and gait is age appropriate without ataxia  SKIN: many dark moles vs seb keratosis   NEURO: Normal strength and tone, sensory exam grossly normal, mentation intact and speech normal  PSYCH: mentation appears normal and affect normal/bright    Diagnostic Test Results:  Labs reviewed in Epic    ASSESSMENT / PLAN:   1. Encounter for Medicare annual wellness exam     - Comprehensive metabolic panel (BMP + Alb, Alk Phos, ALT, AST, Total. Bili, TP)  - UA with Microscopic reflex to Culture    2. Hypertension goal BP (blood pressure) < 140/90  BP is well controlled   - Comprehensive metabolic panel (BMP + Alb, Alk Phos, ALT, AST, Total. Bili, TP)  - Albumin Random Urine Quantitative with Creat Ratio  - UA with Microscopic reflex to Culture    3. Hyperlipidemia with target LDL less than 130  Lipids are pending   On statin  - Lipid panel reflex to direct LDL Fasting  - Comprehensive metabolic panel (BMP + Alb, Alk Phos, ALT, AST, Total. Bili, TP)  - UA with Microscopic reflex to Culture    4. Elevated glucose     - Comprehensive metabolic panel (BMP + Alb, Alk Phos, ALT, AST, Total. Bili, TP)  - Hemoglobin A1c  - UA with Microscopic reflex to Culture    Patient has been advised of split billing requirements and indicates understanding: Yes  COUNSELING:      Estimated body mass index is 32.79 kg/m  as calculated from the following:    Height as of 9/28/20: 1.638 m (5' 4.5\").    Weight as of 9/28/20: 88 kg (194 lb).    Weight management plan: Discussed healthy diet and exercise guidelines    She reports that she has never " smoked. She has never used smokeless tobacco.      Appropriate preventive services were discussed with this patient, including applicable screening as appropriate for cardiovascular disease, diabetes, osteopenia/osteoporosis, and glaucoma.  As appropriate for age/gender, discussed screening for colorectal cancer, prostate cancer, breast cancer, and cervical cancer. Checklist reviewing preventive services available has been given to the patient.    Reviewed patients plan of care and provided an AVS. The Basic Care Plan (routine screening as documented in Health Maintenance) for Harriet meets the Care Plan requirement. This Care Plan has been established and reviewed with the Patient.    Counseling Resources:  ATP IV Guidelines  Pooled Cohorts Equation Calculator  Breast Cancer Risk Calculator  Breast Cancer: Medication to Reduce Risk  FRAX Risk Assessment  ICSI Preventive Guidelines  Dietary Guidelines for Americans, 2010  USDA's MyPlate  ASA Prophylaxis  Lung CA Screening    Jolanta Wilkerson DO  [facility]    Identified Health Risks:

## 2021-01-26 NOTE — NURSING NOTE
"Chief Complaint   Patient presents with     Medicare Visit     /72   Pulse 50   Resp 15   Ht 1.638 m (5' 4.5\")   Wt 88.2 kg (194 lb 6.4 oz)   SpO2 98%   BMI 32.85 kg/m   Estimated body mass index is 32.85 kg/m  as calculated from the following:    Height as of this encounter: 1.638 m (5' 4.5\").    Weight as of this encounter: 88.2 kg (194 lb 6.4 oz).  Medication Reconciliation: complete        Health Maintenance Due Pending Provider Review:  NONE    n/a    Barb Archer MA  [facility]  [phone]  "

## 2021-01-28 LAB
BACTERIA SPEC CULT: NORMAL
Lab: NORMAL
SPECIMEN SOURCE: NORMAL

## 2021-01-28 NOTE — RESULT ENCOUNTER NOTE
Dear Harriet,   Your test results are all back -   -Cholesterol levels (LDL,HDL, Triglycerides) are normal.  ADVISE: rechecking in 1 year.   -Liver and gallbladder tests are normal (ALT,AST, Alk phos, bilirubin), kidney function is normal (Cr, GFR), sodium is normal, potassium is normal, calcium is normal, glucose is normal.  -A1C (diabetic test) is elevated and a sign of PRE-diabetes.  ADVISE plan to recheck in 6 months - keep working and exercise and healthy diet  -Urine culture is normal.  There is no need for antibiotics at this point.  If new, worsening or persistent symptoms occur, then you should call or return for a recheck.  Let us know if you have any questions.  -Jolanta Wilkerson, DO

## 2021-02-15 DIAGNOSIS — I10 HYPERTENSION GOAL BP (BLOOD PRESSURE) < 140/90: ICD-10-CM

## 2021-02-15 RX ORDER — HYDROCHLOROTHIAZIDE 25 MG/1
TABLET ORAL
Qty: 90 TABLET | Refills: 3 | Status: SHIPPED | OUTPATIENT
Start: 2021-02-15 | End: 2021-11-30

## 2021-02-17 DIAGNOSIS — I10 HYPERTENSION GOAL BP (BLOOD PRESSURE) < 140/90: ICD-10-CM

## 2021-02-17 RX ORDER — LOSARTAN POTASSIUM 25 MG/1
25 TABLET ORAL DAILY
Qty: 90 TABLET | Refills: 2 | Status: SHIPPED | OUTPATIENT
Start: 2021-02-17 | End: 2021-11-16

## 2021-02-22 ENCOUNTER — OFFICE VISIT (OUTPATIENT)
Dept: OPHTHALMOLOGY | Facility: CLINIC | Age: 76
End: 2021-02-22
Payer: COMMERCIAL

## 2021-02-22 DIAGNOSIS — H52.4 PRESBYOPIA OF BOTH EYES: ICD-10-CM

## 2021-02-22 DIAGNOSIS — H40.003 GLAUCOMA SUSPECT OF BOTH EYES: ICD-10-CM

## 2021-02-22 DIAGNOSIS — H52.13 MYOPIA OF BOTH EYES: Primary | ICD-10-CM

## 2021-02-22 DIAGNOSIS — H01.006 BLEPHARITIS OF BOTH EYES, UNSPECIFIED EYELID, UNSPECIFIED TYPE: ICD-10-CM

## 2021-02-22 DIAGNOSIS — H01.003 BLEPHARITIS OF BOTH EYES, UNSPECIFIED EYELID, UNSPECIFIED TYPE: ICD-10-CM

## 2021-02-22 DIAGNOSIS — H25.13 NUCLEAR SCLEROTIC CATARACT OF BOTH EYES: ICD-10-CM

## 2021-02-22 PROCEDURE — 92083 EXTENDED VISUAL FIELD XM: CPT | Performed by: OPHTHALMOLOGY

## 2021-02-22 PROCEDURE — 92014 COMPRE OPH EXAM EST PT 1/>: CPT | Performed by: OPHTHALMOLOGY

## 2021-02-22 PROCEDURE — 92133 CPTRZD OPH DX IMG PST SGM ON: CPT | Performed by: OPHTHALMOLOGY

## 2021-02-22 PROCEDURE — 92015 DETERMINE REFRACTIVE STATE: CPT | Performed by: OPHTHALMOLOGY

## 2021-02-22 ASSESSMENT — CONF VISUAL FIELD
OS_NORMAL: 1
OD_NORMAL: 1
METHOD: COUNTING FINGERS

## 2021-02-22 ASSESSMENT — CUP TO DISC RATIO
OS_RATIO: 0.55
OD_RATIO: 0.35

## 2021-02-22 ASSESSMENT — REFRACTION_MANIFEST
OS_CYLINDER: +0.50
OD_SPHERE: -0.50
OS_ADD: +2.50
OD_ADD: +2.50
OS_AXIS: 015
OS_SPHERE: -0.50
OD_CYLINDER: SPHERE

## 2021-02-22 ASSESSMENT — VISUAL ACUITY
OD_SC+: -1
OS_SC+: -1
METHOD: SNELLEN - LINEAR
OS_SC: 20/30
OD_SC: 20/25

## 2021-02-22 ASSESSMENT — TONOMETRY
OD_IOP_MMHG: 15
IOP_METHOD: TONOPEN
OS_IOP_MMHG: 16
IOP_METHOD: APPLANATION
OS_IOP_MMHG: 20
OD_IOP_MMHG: 12

## 2021-02-22 ASSESSMENT — PACHYMETRY
OD_CT(UM): 553
OS_CT(UM): 552

## 2021-02-22 ASSESSMENT — EXTERNAL EXAM - LEFT EYE: OS_EXAM: NORMAL

## 2021-02-22 ASSESSMENT — REFRACTION_WEARINGRX
OD_SPHERE: +2.50
SPECS_TYPE: OTC
OS_SPHERE: +2.50

## 2021-02-22 ASSESSMENT — EXTERNAL EXAM - RIGHT EYE: OD_EXAM: NORMAL

## 2021-02-22 NOTE — NURSING NOTE
Chief Complaints and History of Present Illnesses   Patient presents with     COMPREHENSIVE EYE EXAM     Chief Complaint(s) and History of Present Illness(es)     COMPREHENSIVE EYE EXAM     Laterality: both eyes    Onset: years ago    Frequency: constantly    Course: stable    Treatments tried: no treatments    Pain scale: 0/10              Comments     Annual exam. Pt states vision stable. Only wearing glasses for reading.     OCT&VF today.     THANH Baker COT 9:17 AM February 22, 2021

## 2021-02-22 NOTE — PROGRESS NOTES
[facility]  Harriet Lance is a 75 year old female here for comprehensive eye exam and glaucoma suspect testing.  Mild blurry vision with night driving, used to be able to see very far away without correction and now she cannot.  Wears over the counter readers. No eye pain or irritation.    PMH:  Hypertension, seasonal allergies  POH:  Glasses for reading only, cataracts, glaucoma suspect, no surgery, no trauma  Oc Meds:  None ([place] [facility] [place]  FH:  Denies any glaucoma, age related macular degeneration, or other known eye diseases       Assessment & Plan      (H40.003) Glaucoma suspect of both eyes  (primary encounter diagnosis)  Comment: based on cup to discontinue, was [place] [facility] [place] at least 2005, was on Travatan in past but was taken off in past -- K pachy: 553/552   Tmax: 20/22    HVF: Fluct in 2015, 2016     CDR: 0.3/0.5    HRT/OCT: RNFL WNL      FHX of Glc:  No    Gonio: open      Intolerant to:      Asthma/COPD: No, on po BB  Steroid Use: No    Kidney Stones: No    Sulfa Allergy:No      IOP targets:L20s -- IOP good mid to low teens  Plan: OCT Optic Nerve RNFL Spectralis OU (both eyes), stable (all green)both eyes         OVF 24-2 Dynamic each eye- Non specific defects left eye more than right eye, patient said eyes got watery during the test, pretty good reliability despite that  Follow-up in one year    (H25.13) Nuclear sclerotic cataract of both eyes - Both Eyes  Comment: mild, not visually significant   Plan: follow     (H52.13) Myopia of both eyes  (primary encounter diagnosis)  (H52.4) Presbyopia of both eyes - Both Eyes  Comment: mild prescription for distance   Plan: manifest refraction done and prescription for glasses given     (H01.003,  H01.006) Blepharitis of both eyes, unspecified eyelid, unspecified type - Both Eyes  Comment: asymptomatic   Plan: warm moist compresses as needed,  follow      -----------------------------------------------------------------------------------    Patient disposition:   Return in about 1 year (around 2/22/2022) for Comprehensive Exam, OCT nerve (RNFL) OU, Octopus 24-2. Call for sooner appointment as needed.    Complete documentation of historical and exam elements from today's encounter can be found in the full encounter summary report (not reduplicated in this progress note). I personally obtained the chief complaint(s) and history of present illness.  I have confirmed and edited as necessary the CC, HPI, PMH/PSH, social history, FMH, ROS, and exam/neuro findings as obtained by the technician or others. I have examined this patient myself and I personally viewed the image(s) and studies listed above and the documentation reflects my findings and interpretation.  I formulated and edited as necessary the assessment and plan and discussed the findings and management plan with the patient and family.     Balbina Aguilar MD

## 2021-02-25 DIAGNOSIS — I10 HYPERTENSION GOAL BP (BLOOD PRESSURE) < 140/90: ICD-10-CM

## 2021-02-26 RX ORDER — AMLODIPINE BESYLATE 10 MG/1
10 TABLET ORAL DAILY
Qty: 90 TABLET | Refills: 3 | Status: SHIPPED | OUTPATIENT
Start: 2021-02-26 | End: 2022-02-15

## 2021-10-14 ENCOUNTER — MYC MEDICAL ADVICE (OUTPATIENT)
Dept: FAMILY MEDICINE | Facility: CLINIC | Age: 76
End: 2021-10-14

## 2021-10-23 ENCOUNTER — HEALTH MAINTENANCE LETTER (OUTPATIENT)
Age: 76
End: 2021-10-23

## 2021-11-16 DIAGNOSIS — I10 HYPERTENSION GOAL BP (BLOOD PRESSURE) < 140/90: ICD-10-CM

## 2021-11-16 RX ORDER — LOSARTAN POTASSIUM 25 MG/1
TABLET ORAL
Qty: 90 TABLET | Refills: 0 | Status: SHIPPED | OUTPATIENT
Start: 2021-11-16 | End: 2022-02-15

## 2021-11-16 RX ORDER — HYDROCHLOROTHIAZIDE 25 MG/1
TABLET ORAL
Start: 2021-11-16

## 2021-11-16 NOTE — TELEPHONE ENCOUNTER
Losartan:    Prescription approved per [facility] Refill Protocol.      Hydrochlorothiazide:    Too soon  Rx sent 2/15/21 for #90 with 3 refills.    Enid Shin RN

## 2021-11-29 ENCOUNTER — MYC MEDICAL ADVICE (OUTPATIENT)
Dept: FAMILY MEDICINE | Facility: CLINIC | Age: 76
End: 2021-11-29
Payer: COMMERCIAL

## 2021-11-29 DIAGNOSIS — I10 HYPERTENSION GOAL BP (BLOOD PRESSURE) < 140/90: ICD-10-CM

## 2021-11-30 RX ORDER — HYDROCHLOROTHIAZIDE 25 MG/1
TABLET ORAL
Qty: 90 TABLET | Refills: 0 | Status: SHIPPED | OUTPATIENT
Start: 2021-11-30 | End: 2022-02-15

## 2021-12-07 ENCOUNTER — ANCILLARY PROCEDURE (OUTPATIENT)
Dept: MAMMOGRAPHY | Facility: CLINIC | Age: 76
End: 2021-12-07
Payer: COMMERCIAL

## 2021-12-07 DIAGNOSIS — Z12.31 VISIT FOR SCREENING MAMMOGRAM: ICD-10-CM

## 2021-12-07 PROCEDURE — 77063 BREAST TOMOSYNTHESIS BI: CPT | Mod: GC | Performed by: RADIOLOGY

## 2021-12-07 PROCEDURE — 77067 SCR MAMMO BI INCL CAD: CPT | Mod: GC | Performed by: RADIOLOGY

## 2021-12-18 NOTE — NURSING NOTE
"Chief Complaint   Patient presents with     Medicare Visit     BP (!) 144/84   Pulse (!) 48   Temp 97.4  F (36.3  C) (Oral)   Ht 1.638 m (5' 4.5\")   Wt 86.8 kg (191 lb 4.8 oz)   SpO2 98%   BMI 32.33 kg/m   Estimated body mass index is 32.33 kg/m  as calculated from the following:    Height as of this encounter: 1.638 m (5' 4.5\").    Weight as of this encounter: 86.8 kg (191 lb 4.8 oz).  bp completed using cuff size: large      Health Maintenance addressed:  Mammogram    Pt will schedule mammo appt.    Sybil Beltran MA    " Pt to er with c/o vomiting chilss , fever and fatigue onset 2 days ago

## 2022-01-25 NOTE — TELEPHONE ENCOUNTER
FUTURE VISIT INFORMATION      FUTURE VISIT INFORMATION:    Date: 2/24/22    Time: 9:00am    Location: [facility]  REFERRAL INFORMATION:    Referring provider:  self    Referring providers clinic:  N/A    Reason for visit/diagnosis  Annual eye exam    RECORDS REQUESTED FROM:       Clinic name Comments Records Status Imaging Status   [facility] 2/22/21, 2/17/20, 9/4/18, 2/19/16 EPIC

## 2022-02-15 DIAGNOSIS — I10 HYPERTENSION GOAL BP (BLOOD PRESSURE) < 140/90: ICD-10-CM

## 2022-02-15 RX ORDER — AMLODIPINE BESYLATE 10 MG/1
TABLET ORAL
Qty: 30 TABLET | Refills: 0 | Status: SHIPPED | OUTPATIENT
Start: 2022-02-15 | End: 2022-04-04

## 2022-02-15 RX ORDER — LOSARTAN POTASSIUM 25 MG/1
TABLET ORAL
Qty: 30 TABLET | Refills: 0 | Status: SHIPPED | OUTPATIENT
Start: 2022-02-15 | End: 2022-04-25

## 2022-02-15 RX ORDER — HYDROCHLOROTHIAZIDE 25 MG/1
TABLET ORAL
Qty: 30 TABLET | Refills: 0 | Status: SHIPPED | OUTPATIENT
Start: 2022-02-15 | End: 2022-04-25

## 2022-02-15 NOTE — TELEPHONE ENCOUNTER
Medication is being filled for 1 time refill only due to:  Patient needs to be seen because it has been more than one year since last visit.     Due for physical.  Last 1/26/21.  Enid Shin RN

## 2022-02-24 ENCOUNTER — OFFICE VISIT (OUTPATIENT)
Dept: OPHTHALMOLOGY | Facility: CLINIC | Age: 77
End: 2022-02-24
Payer: COMMERCIAL

## 2022-02-24 ENCOUNTER — PRE VISIT (OUTPATIENT)
Dept: OPHTHALMOLOGY | Facility: CLINIC | Age: 77
End: 2022-02-24

## 2022-02-24 VITALS — WEIGHT: 210 LBS | BODY MASS INDEX: 35.85 KG/M2 | HEIGHT: 64 IN

## 2022-02-24 DIAGNOSIS — H52.13 MYOPIC ASTIGMATISM OF BOTH EYES: ICD-10-CM

## 2022-02-24 DIAGNOSIS — H25.813 MIXED TYPE AGE-RELATED CATARACT, BOTH EYES: ICD-10-CM

## 2022-02-24 DIAGNOSIS — H52.4 PRESBYOPIA OF BOTH EYES: ICD-10-CM

## 2022-02-24 DIAGNOSIS — H40.003 GLAUCOMA SUSPECT OF BOTH EYES: Primary | ICD-10-CM

## 2022-02-24 DIAGNOSIS — H52.203 MYOPIC ASTIGMATISM OF BOTH EYES: ICD-10-CM

## 2022-02-24 DIAGNOSIS — H40.003 GLAUCOMA SUSPECT OF BOTH EYES: ICD-10-CM

## 2022-02-24 PROCEDURE — 92133 CPTRZD OPH DX IMG PST SGM ON: CPT | Performed by: OPHTHALMOLOGY

## 2022-02-24 PROCEDURE — 99214 OFFICE O/P EST MOD 30 MIN: CPT | Performed by: OPHTHALMOLOGY

## 2022-02-24 PROCEDURE — 92015 DETERMINE REFRACTIVE STATE: CPT | Performed by: OPHTHALMOLOGY

## 2022-02-24 PROCEDURE — 92083 EXTENDED VISUAL FIELD XM: CPT | Performed by: OPHTHALMOLOGY

## 2022-02-24 ASSESSMENT — CUP TO DISC RATIO
OD_RATIO: 0.35
OS_RATIO: 0.55

## 2022-02-24 ASSESSMENT — REFRACTION_WEARINGRX
OS_SPHERE: -0.50
OS_CYLINDER: +0.50
OD_CYLINDER: SPHERE
OD_SPHERE: +2.75
OS_CYLINDER: SPHERE
OD_SPHERE: -0.50
OD_CYLINDER: SPHERE
OS_AXIS: 015
SPECS_TYPE: SVL
OS_SPHERE: +2.75

## 2022-02-24 ASSESSMENT — REFRACTION_MANIFEST
OS_ADD: +2.75
OS_SPHERE: -1.25
OS_AXIS: 015
OS_CYLINDER: +1.00
OD_CYLINDER: SPHERE
OD_SPHERE: -1.00
OD_ADD: +2.75

## 2022-02-24 ASSESSMENT — PACHYMETRY
OS_CT(UM): 552
OD_CT(UM): 553

## 2022-02-24 ASSESSMENT — EXTERNAL EXAM - RIGHT EYE: OD_EXAM: NORMAL

## 2022-02-24 ASSESSMENT — REFRACTION
OD_ADD: +2.75
OD_SPHERE: -1.25
OS_SPHERE: -1.25
OS_AXIS: 025
OS_ADD: +2.75
OD_CYLINDER: +0.50
OD_AXIS: 015
OS_CYLINDER: +0.75

## 2022-02-24 ASSESSMENT — VISUAL ACUITY
OS_CC: 20/50
OS_PH_CC: 20/40
OS_CC: J2
METHOD: SNELLEN - LINEAR
CORRECTION_TYPE: GLASSES
OD_CC: J2
OS_CC+: +1
OD_CC: 20/30
OD_CC+: +2

## 2022-02-24 ASSESSMENT — TONOMETRY
IOP_METHOD: APPLANATION
OD_IOP_MMHG: 17
OS_IOP_MMHG: 18

## 2022-02-24 ASSESSMENT — CONF VISUAL FIELD
OD_NORMAL: 1
COMMENTS: SOME PEAKING OU
OS_NORMAL: 1
METHOD: COUNTING FINGERS

## 2022-02-24 ASSESSMENT — EXTERNAL EXAM - LEFT EYE: OS_EXAM: NORMAL

## 2022-02-24 NOTE — PROGRESS NOTES
HPI  Harriet Lance is a 76 year old female here for comprehensive eye exam and glaucoma suspect testing.  Mild blurry vision with current glasses but mostly doesn't wear distance glasses, meeting most visual demands.  No eye pain or irritation.    PMH:  Hypertension, seasonal allergies  POH:  Glasses for reading only, cataracts, glaucoma suspect, no surgery, no trauma  Oc Meds:  None (latan in the past at [facility] [place] was taken off)  FH:  Denies any glaucoma, age related macular degeneration, or other known eye diseases       Assessment & Plan      (H40.003) Glaucoma suspect of both eyes  (primary encounter diagnosis)  Comment: based on cup to discontinue, was being followed at [facility] for glc since at least 2005, was on Travatan in past but was taken off in past -- K pachy: 553/552   Tmax: 20/22    HVF: Fluct in 2015, 2016     CDR: 0.3/0.5    HRT/OCT: RNFL WNL      FHX of Glc:  No    Gonio: open      Intolerant to:      Asthma/COPD: No, on po BB  Steroid Use: No    Kidney Stones: No    Sulfa Allergy:No      IOP targets:L20s -- IOP good mid to low teens  Plan: OCT Optic Nerve RNFL Spectralis OU (both eyes), stable (all green)both eyes         OVF 24-2 Dynamic each eye- Non specific defects both eyes, reliability issues, over likely stable both eyes and good intraocular pressure and oct so will follow-up in one year    (H25.813) Mixed type age-related cataract, both eyes - Both Eyes  Comment: mildly visually significant , limiting best corrected visual acuity some  Plan: follow     (H52.13) Myopia of both eyes    (H52.4) Presbyopia of both eyes - Both Eyes  Comment: mild change   Plan: manifest refraction done and prescription for glasses given to fill as needed       -----------------------------------------------------------------------------------    Patient disposition:   Return in about 1 year (around 2/24/2023) for Comprehensive Exam, OCT nerve (RNFL) OU, Octopus 24-2, cataracts R>L, glc suspect. Call for sooner  appointment as needed.    Complete documentation of historical and exam elements from today's encounter can be found in the full encounter summary report (not reduplicated in this progress note). I personally obtained the chief complaint(s) and history of present illness.  I have confirmed and edited as necessary the CC, HPI, PMH/PSH, social history, FMH, ROS, and exam/neuro findings as obtained by the technician or others. I have examined this patient myself and I personally viewed the image(s) and studies listed above and the documentation reflects my findings and interpretation.  I formulated and edited as necessary the assessment and plan and discussed the findings and management plan with the patient and family.     Balbina Aguilar MD

## 2022-02-24 NOTE — NURSING NOTE
Chief Complaints and History of Present Illnesses   Patient presents with     COMPREHENSIVE EYE EXAM     Glaucoma Suspect/ Cataracts OU     Chief Complaint(s) and History of Present Illness(es)     COMPREHENSIVE EYE EXAM     Laterality: both eyes    Associated symptoms: Negative for dryness, eye pain, tearing and discharge    Treatments tried: no treatments    Pain scale: 0/10    Comments: Glaucoma Suspect/ Cataracts OU              Comments     Did update glasses Rx last year. Those are working OK for distance. Questions if they are crisp as they could be.   OTC readers for near used. Finds that she needs to bring print too close to read.     Aaliyah Walters, COT COT 9:08 AM February 24, 2022

## 2022-04-09 ENCOUNTER — HEALTH MAINTENANCE LETTER (OUTPATIENT)
Age: 77
End: 2022-04-09

## 2022-05-02 NOTE — PROGRESS NOTES
"SUBJECTIVE:   Harriet Lance is a 77 year old female who presents for Preventive Visit.       Patient has been advised of split billing requirements and indicates understanding: Yes  Are you in the first 12 months of your Medicare coverage?  No    Healthy Habits:     In general, how would you rate your overall health?  Good    Frequency of exercise:  1 day/week    Duration of exercise:  30-45 minutes    Do you usually eat at least 4 servings of fruit and vegetables a day, include whole grains    & fiber and avoid regularly eating high fat or \"junk\" foods?  No    Taking medications regularly:  Yes    Medication side effects:  None    Ability to successfully perform activities of daily living:  No assistance needed    Home Safety:  No safety concerns identified    Hearing Impairment:  Difficulty following a conversation in a noisy restaurant or crowded room, difficult to understand a speaker at a public meeting or [employer] service and difficulty understanding speech on the telephone    In the past 6 months, have you been bothered by leaking of urine?  No    In general, how would you rate your overall mental or emotional health?  Good      PHQ-2 Total Score: 0    Additional concerns today:  Yes    Do you feel safe in your environment? Yes    Have you ever done Advance Care Planning? (For example, a Health Directive, POLST, or a discussion with a medical provider or your loved ones about your wishes): Yes, advance care planning is on file.       Fall risk  Fallen 2 or more times in the past year?: No  Any fall with injury in the past year?: No    Cognitive Screening   1) Repeat 3 items (Leader, Season, Table)    2) Clock draw: ABNORMAL Hands of clock inappropriate lengths  3) 3 item recall: Recalls 3 objects  Results: 3 items recalled: COGNITIVE IMPAIRMENT LESS LIKELY    Mini-CogTM Copyright CARTER Randhawa. Licensed by the author for use in [facility]; reprinted with permission (stefan@.[facility]). All rights " reserved.      Do you have sleep apnea, excessive snoring or daytime drowsiness?: yes; snores     Reviewed and updated as needed this visit by clinical staff   Tobacco  Allergies  Meds  Problems  Med Hx  Surg Hx  Fam Hx  Soc   Hx          Reviewed and updated as needed this visit by Provider   Tobacco  Allergies  Meds  Problems  Med Hx  Surg Hx  Fam Hx           Social History     Tobacco Use     Smoking status: Never Smoker     Smokeless tobacco: Never Used   Substance Use Topics     Alcohol use: Never     Comment: rarely          No flowsheet data found.        PROBLEMS TO ADD ON...  -------------------------------------    Chest pressure -   Intermittent a few times  Not clear if heart burn or other  Resolved with time    Back pain by scapula - either side will have it  Feels like something is stabbing from the inside    Current providers sharing in care for this patient include:   Patient Care Team:  Jolanta Wilkerson DO as PCP - General  Amaury Ledesma MD as MD (Family Medicine - Sports Medicine)  Jolanta Wilkerson DO as Assigned PCP  Balbina Aguilar MD as MD (Ophthalmology)  Balbina Aguilar MD as Assigned Surgical Provider  Fredis Galvan MD as MD (Dermatology)    The following health maintenance items are reviewed in Epic and correct as of today:  Health Maintenance Due   Topic Date Due     DTAP/TDAP/TD IMMUNIZATION (1 - Tdap) 12/15/2017     HEMOGLOBIN  01/21/2021     MICROALBUMIN  01/26/2022     COVID-19 Vaccine (4 - Booster for Moderna series) 03/05/2022     Patient Active Problem List   Diagnosis     Allergic rhinitis     Diffuse cystic mastopathy     History of malignant neoplasm of large intestine     Enlarged lymph nodes     CARDIOVASCULAR SCREENING; LDL GOAL LESS THAN 100     Borderline glaucoma (glaucoma suspect)     Hypertension goal BP (blood pressure) < 140/90     Advanced directives, counseling/discussion     Arthritis of hip     HL (hearing loss)      Hyperlipidemia with target LDL less than 130     BMI 37.0-37.9, adult     Benign neoplasm of colon     Stage 3 chronic kidney disease, unspecified whether stage 3a or 3b CKD (H)     Past Surgical History:   Procedure Laterality Date     AS TOTAL HIP ARTHROPLASTY Right 2017    done at [facility]     BIOPSY  1975    Age 30     ORTHOPEDIC SURGERY  ,     ZZC REMOVAL COLON/ILEOSTOMY  1998    8 inches of colon removed - not clear what section       Social History     Tobacco Use     Smoking status: Never Smoker     Smokeless tobacco: Never Used   Substance Use Topics     Alcohol use: Never     Comment: rarely     Family History   Problem Relation Age of Onset     Heart Disease Mother          of heart Attack at age 75     Hypertension Mother      Cancer Father         Prostate     Diabetes Father      Prostate Cancer Father      Allergies Father      Cancer - colorectal Maternal Grandmother         Colon Resection     Breast Cancer Maternal Grandmother      Cancer - colorectal Maternal Uncle      Glaucoma No family hx of      Macular Degeneration No family hx of          Pneumonia Vaccine:UTD  Mammogram Screening: Mammogram Screening - Patient over age 75, has elected to continue with screening.      Pertinent mammograms are reviewed under the imaging tab.    Review of Systems   Constitutional: Negative for chills and fever.   HENT: Positive for hearing loss. Negative for congestion, ear pain and sore throat.    Eyes: Positive for visual disturbance. Negative for pain.   Respiratory: Negative for cough and shortness of breath.    Cardiovascular: Positive for chest pain. Negative for palpitations and peripheral edema.   Gastrointestinal: Positive for heartburn. Negative for abdominal pain, constipation, diarrhea, hematochezia and nausea.   Breasts:  Positive for tenderness. Negative for breast mass and discharge.   Genitourinary: Negative for dysuria, frequency, genital sores, hematuria, pelvic pain,  "urgency, vaginal bleeding and vaginal discharge.   Musculoskeletal: Positive for arthralgias. Negative for joint swelling and myalgias.   Skin: Negative for rash.   Neurological: Negative for dizziness, weakness, headaches and paresthesias.   Psychiatric/Behavioral: Negative for mood changes. The patient is not nervous/anxious.          OBJECTIVE:   /62   Pulse 54   Temp 97.7  F (36.5  C)   Resp 18   Ht 1.635 m (5' 4.37\")   Wt 91.2 kg (201 lb)   SpO2 100%   BMI 34.11 kg/m   Estimated body mass index is 34.11 kg/m  as calculated from the following:    Height as of this encounter: 1.635 m (5' 4.37\").    Weight as of this encounter: 91.2 kg (201 lb).  Physical Exam  GENERAL APPEARANCE: healthy, alert and no distress  EYES: Eyes grossly normal to inspection, PERRL and conjunctivae and sclerae normal  HENT: ear canals and TM's normal and both ears: occluded with wax and removed with lighted curette and water irrigation by myself and RN  NECK: no adenopathy, no asymmetry, masses, or scars and thyroid normal to palpation  RESP: lungs clear to auscultation - no rales, rhonchi or wheezes  BREAST: normal without masses, tenderness or nipple discharge and no palpable axillary masses or adenopathy  CV: regular rate and rhythm, normal S1 S2, no S3 or S4, no murmur, click or rub, no peripheral edema and peripheral pulses strong  ABDOMEN: soft, nontender, no hepatosplenomegaly, no masses and bowel sounds normal  MS: no musculoskeletal defects are noted and gait is age appropriate without ataxia  SKIN: many moles  NEURO: Normal strength and tone, sensory exam grossly normal, mentation intact and speech normal  PSYCH: mentation appears normal and affect normal/bright    Diagnostic Test Results:  Labs reviewed in Epic    ASSESSMENT / PLAN:   (Z00.00) Encounter for Medicare annual wellness exam  (primary encounter diagnosis)  Comment:    Plan:      (I10) Hypertension goal BP (blood pressure) < 140/90  Comment: BP is well " "controlled with this combination   Plan: amLODIPine (NORVASC) 10 MG tablet,         hydrochlorothiazide (HYDRODIURIL) 25 MG tablet,        losartan (COZAAR) 25 MG tablet, metoprolol         succinate ER (TOPROL-XL) 50 MG 24 hr tablet,         Comprehensive metabolic panel (BMP + Alb, Alk         Phos, ALT, AST, Total. Bili, TP), Albumin         Random Urine Quantitative with Creat Ratio             (E78.5) Hyperlipidemia with target LDL less than 130  Comment: lipids pending - refilled med   Plan: pravastatin (PRAVACHOL) 40 MG tablet, Lipid         panel reflex to direct LDL Fasting,         Comprehensive metabolic panel (BMP + Alb, Alk         Phos, ALT, AST, Total. Bili, TP)             (R07.89) Chest pressure  Comment: EKG today shows sinus jesse but unchanged from previous and no signs.    Infrequent but if happening more she needs further evaluation   She can try maalox as this could be GERD   Advised to call or RTC if this worsens   Plan: EKG 12-lead complete w/read - Clinics              (Z12.11) Special screening for malignant neoplasms, colon  Comment: referral for colonoscopy -   Hx of colon cancer remote -   Plan: Adult Gastro Ref - Procedure Only             (H61.23) Bilateral impacted cerumen  Comment: removed today with lighted curette and water irrigation   Plan: REMOVE IMPACTED CERUMEN             (Z12.83) Skin exam, screening for cancer  Comment: many moles   Plan: Adult Dermatology Referral             (N18.30) Stage 3 chronic kidney disease, unspecified whether stage 3a or 3b CKD (H)  Comment:    Plan:      Patient has been advised of split billing requirements and indicates understanding: Yes    COUNSELING:  Reviewed preventive health counseling, as reflected in patient instructions    Estimated body mass index is 34.11 kg/m  as calculated from the following:    Height as of this encounter: 1.635 m (5' 4.37\").    Weight as of this encounter: 91.2 kg (201 lb).    Weight management plan: Discussed " healthy diet and exercise guidelines    She reports that she has never smoked. She has never used smokeless tobacco.      Appropriate preventive services were discussed with this patient, including applicable screening as appropriate for cardiovascular disease, diabetes, osteopenia/osteoporosis, and glaucoma.  As appropriate for age/gender, discussed screening for colorectal cancer, prostate cancer, breast cancer, and cervical cancer. Checklist reviewing preventive services available has been given to the patient.    Reviewed patients plan of care and provided an AVS. The Basic Care Plan (routine screening as documented in Health Maintenance) for [facility] meets the Care Plan requirement. This Care Plan has been established and reviewed with the Patient.    Counseling Resources:  ATP IV Guidelines  Pooled Cohorts Equation Calculator  Breast Cancer Risk Calculator  Breast Cancer: Medication to Reduce Risk  FRAX Risk Assessment  ICSI Preventive Guidelines  Dietary Guidelines for Americans, 2010  USDA's MyPlate  ASA Prophylaxis  Lung CA Screening    Jolanta Wilkerson DO  [facility]    Identified Health Risks:

## 2022-05-03 ASSESSMENT — ENCOUNTER SYMPTOMS
CONSTIPATION: 0
SORE THROAT: 0
ABDOMINAL PAIN: 0
FREQUENCY: 0
PARESTHESIAS: 0
DYSURIA: 0
ARTHRALGIAS: 1
NERVOUS/ANXIOUS: 0
HEARTBURN: 1
MYALGIAS: 0
FEVER: 0
WEAKNESS: 0
NAUSEA: 0
DIARRHEA: 0
CHILLS: 0
HEMATURIA: 0
PALPITATIONS: 0
EYE PAIN: 0
SHORTNESS OF BREATH: 0
BREAST MASS: 0
JOINT SWELLING: 0
HEMATOCHEZIA: 0
HEADACHES: 0
COUGH: 0
DIZZINESS: 0

## 2022-05-03 ASSESSMENT — ACTIVITIES OF DAILY LIVING (ADL): CURRENT_FUNCTION: NO ASSISTANCE NEEDED

## 2022-05-04 ENCOUNTER — OFFICE VISIT (OUTPATIENT)
Dept: FAMILY MEDICINE | Facility: CLINIC | Age: 77
End: 2022-05-04
Payer: COMMERCIAL

## 2022-05-04 VITALS
HEIGHT: 64 IN | DIASTOLIC BLOOD PRESSURE: 62 MMHG | OXYGEN SATURATION: 100 % | TEMPERATURE: 97.7 F | WEIGHT: 201 LBS | HEART RATE: 54 BPM | RESPIRATION RATE: 18 BRPM | BODY MASS INDEX: 34.31 KG/M2 | SYSTOLIC BLOOD PRESSURE: 125 MMHG

## 2022-05-04 DIAGNOSIS — H61.23 BILATERAL IMPACTED CERUMEN: ICD-10-CM

## 2022-05-04 DIAGNOSIS — N18.30 STAGE 3 CHRONIC KIDNEY DISEASE, UNSPECIFIED WHETHER STAGE 3A OR 3B CKD (H): ICD-10-CM

## 2022-05-04 DIAGNOSIS — Z12.11 SPECIAL SCREENING FOR MALIGNANT NEOPLASMS, COLON: ICD-10-CM

## 2022-05-04 DIAGNOSIS — Z12.83 SKIN EXAM, SCREENING FOR CANCER: ICD-10-CM

## 2022-05-04 DIAGNOSIS — I10 HYPERTENSION GOAL BP (BLOOD PRESSURE) < 140/90: ICD-10-CM

## 2022-05-04 DIAGNOSIS — E78.5 HYPERLIPIDEMIA WITH TARGET LDL LESS THAN 130: ICD-10-CM

## 2022-05-04 DIAGNOSIS — R07.89 CHEST PRESSURE: ICD-10-CM

## 2022-05-04 DIAGNOSIS — Z00.00 ENCOUNTER FOR MEDICARE ANNUAL WELLNESS EXAM: Primary | ICD-10-CM

## 2022-05-04 LAB
ALBUMIN SERPL-MCNC: 3.7 G/DL (ref 3.4–5)
ALP SERPL-CCNC: 65 U/L (ref 40–150)
ALT SERPL W P-5'-P-CCNC: 27 U/L (ref 0–50)
ANION GAP SERPL CALCULATED.3IONS-SCNC: 4 MMOL/L (ref 3–14)
AST SERPL W P-5'-P-CCNC: 20 U/L (ref 0–45)
BILIRUB SERPL-MCNC: 0.3 MG/DL (ref 0.2–1.3)
BUN SERPL-MCNC: 14 MG/DL (ref 7–30)
CALCIUM SERPL-MCNC: 9.4 MG/DL (ref 8.5–10.1)
CHLORIDE BLD-SCNC: 107 MMOL/L (ref 94–109)
CHOLEST SERPL-MCNC: 135 MG/DL
CO2 SERPL-SCNC: 29 MMOL/L (ref 20–32)
CREAT SERPL-MCNC: 0.96 MG/DL (ref 0.52–1.04)
CREAT UR-MCNC: 91 MG/DL
FASTING STATUS PATIENT QL REPORTED: YES
GFR SERPL CREATININE-BSD FRML MDRD: 61 ML/MIN/1.73M2
GLUCOSE BLD-MCNC: 108 MG/DL (ref 70–99)
HDLC SERPL-MCNC: 67 MG/DL
LDLC SERPL CALC-MCNC: 41 MG/DL
MICROALBUMIN UR-MCNC: 19 MG/L
MICROALBUMIN/CREAT UR: 20.88 MG/G CR (ref 0–25)
NONHDLC SERPL-MCNC: 68 MG/DL
POTASSIUM BLD-SCNC: 3.5 MMOL/L (ref 3.4–5.3)
PROT SERPL-MCNC: 8.1 G/DL (ref 6.8–8.8)
SODIUM SERPL-SCNC: 140 MMOL/L (ref 133–144)
TRIGL SERPL-MCNC: 134 MG/DL

## 2022-05-04 PROCEDURE — 99214 OFFICE O/P EST MOD 30 MIN: CPT | Mod: 25 | Performed by: FAMILY MEDICINE

## 2022-05-04 PROCEDURE — 82043 UR ALBUMIN QUANTITATIVE: CPT | Performed by: FAMILY MEDICINE

## 2022-05-04 PROCEDURE — 93000 ELECTROCARDIOGRAM COMPLETE: CPT | Mod: 59 | Performed by: FAMILY MEDICINE

## 2022-05-04 PROCEDURE — 80053 COMPREHEN METABOLIC PANEL: CPT | Performed by: FAMILY MEDICINE

## 2022-05-04 PROCEDURE — 80061 LIPID PANEL: CPT | Performed by: FAMILY MEDICINE

## 2022-05-04 PROCEDURE — 99397 PER PM REEVAL EST PAT 65+ YR: CPT | Mod: 25 | Performed by: FAMILY MEDICINE

## 2022-05-04 PROCEDURE — 69210 REMOVE IMPACTED EAR WAX UNI: CPT | Performed by: FAMILY MEDICINE

## 2022-05-04 PROCEDURE — 36415 COLL VENOUS BLD VENIPUNCTURE: CPT | Performed by: FAMILY MEDICINE

## 2022-05-04 RX ORDER — AMLODIPINE BESYLATE 10 MG/1
10 TABLET ORAL DAILY
Qty: 90 TABLET | Refills: 3 | Status: SHIPPED | OUTPATIENT
Start: 2022-05-04 | End: 2023-06-16

## 2022-05-04 RX ORDER — PRAVASTATIN SODIUM 40 MG
40 TABLET ORAL DAILY
Qty: 90 TABLET | Refills: 3 | Status: SHIPPED | OUTPATIENT
Start: 2022-05-04 | End: 2023-05-23

## 2022-05-04 RX ORDER — LOSARTAN POTASSIUM 25 MG/1
25 TABLET ORAL DAILY
Qty: 90 TABLET | Refills: 3 | Status: SHIPPED | OUTPATIENT
Start: 2022-05-04 | End: 2023-05-23

## 2022-05-04 RX ORDER — METOPROLOL SUCCINATE 50 MG/1
TABLET, EXTENDED RELEASE ORAL
Qty: 180 TABLET | Refills: 3 | Status: SHIPPED | OUTPATIENT
Start: 2022-05-04 | End: 2023-05-23

## 2022-05-04 RX ORDER — HYDROCHLOROTHIAZIDE 25 MG/1
25 TABLET ORAL DAILY
Qty: 90 TABLET | Refills: 3 | Status: SHIPPED | OUTPATIENT
Start: 2022-05-04 | End: 2023-05-23

## 2022-05-04 ASSESSMENT — ENCOUNTER SYMPTOMS
BREAST MASS: 0
PARESTHESIAS: 0
SORE THROAT: 0
SHORTNESS OF BREATH: 0
EYE PAIN: 0
COUGH: 0
HEADACHES: 0
PALPITATIONS: 0
DYSURIA: 0
HEARTBURN: 1
FEVER: 0
WEAKNESS: 0
NAUSEA: 0
DIARRHEA: 0
HEMATOCHEZIA: 0
MYALGIAS: 0
ABDOMINAL PAIN: 0
DIZZINESS: 0
FREQUENCY: 0
HEMATURIA: 0
ARTHRALGIAS: 1
CHILLS: 0
JOINT SWELLING: 0
CONSTIPATION: 0
NERVOUS/ANXIOUS: 0

## 2022-05-04 ASSESSMENT — ACTIVITIES OF DAILY LIVING (ADL): CURRENT_FUNCTION: NO ASSISTANCE NEEDED

## 2022-05-04 NOTE — PATIENT INSTRUCTIONS
Patient Education   Personalized Prevention Plan  You are due for the preventive services outlined below.  Your care team is available to assist you in scheduling these services.  If you have already completed any of these items, please share that information with your care team to update in your medical record.  Health Maintenance Due   Topic Date Due     ANNUAL REVIEW OF HM ORDERS  Never done     Diptheria Tetanus Pertussis (DTAP/TDAP/TD) Vaccine (1 - Tdap) 12/15/2017     COVID-19 Vaccine (4 - Booster for Moderna series) 03/05/2022       Exercise for a Healthier Heart  You may wonder how you can improve the health of your heart. If you re thinking about exercise, you re on the right track. You don t need to become an athlete. But you do need a certain amount of brisk exercise to help strengthen your heart. If you have been diagnosed with a heart condition, your healthcare provider may advise exercise to help stabilize your condition. To help make exercise a habit, choose safe, fun activities.      Exercise with a friend. When activity is fun, you're more likely to stick with it.   Before you start  Check with your healthcare provider before starting an exercise program. This is especially important if you have not been active for a while. It's also important if you have a long-term (chronic) health problem such as heart disease, diabetes, or obesity. Or if you are at high risk for having these problems.   Why exercise?  Exercising regularly offers many healthy rewards. It can help you do all of the following:     Improve your blood cholesterol level to help prevent further heart trouble    Lower your blood pressure to help prevent a stroke or heart attack    Control diabetes, or reduce your risk of getting this disease    Improve your heart and lung function    Reach and stay at a healthy weight    Make your muscles stronger so you can stay active    Prevent falls and fractures by slowing the loss of bone mass  (osteoporosis)    Manage stress better    Reduce your blood pressure    Improve your sense of self and your body image  Exercise tips      Ease into your routine. Set small goals. Then build on them. If you are not sure what your activity level should be, talk with your healthcare provider first before starting an exercise routine.    Exercise on most days. Aim for a total of 150 minutes (2 hours and 30 minutes) or more of moderate-intensity aerobic activity each week. Or 75 minutes (1 hour and 15 minutes) or more of vigorous-intensity aerobic activity each week. Or try for a combination of both. Moderate activity means that you breathe heavier and your heart rate increases but you can still talk. Think about doing 40 minutes of moderate exercise, 3 to 4 times a week. For best results, activity should last for about 40 minutes to lower blood pressure and cholesterol. It's OK to work up to the 40-minute period over time. Examples of moderate-intensity activity are walking 1 mile in 15 minutes. Or doing 30 to 45 minutes of yard work.    Step up your daily activity level.  Along with your exercise program, try being more active the whole day. Walk instead of drive. Or park further away so that you take more steps each day. Do more household tasks or yard work. You may not be able to meet the advised mount of physical activity. But doing some moderate- or vigorous-intensity aerobic activity can help reduce your risk for heart disease. Your healthcare provider can help you figure out what is best for you.    Choose 1 or more activities you enjoy.  Walking is one of the easiest things you can do. You can also try swimming, riding a bike, dancing, or taking an exercise class.    When to call your healthcare provider  Call your healthcare provider if you have any of these:     Chest pain or feel dizzy or lightheaded    Burning, tightness, pressure, or heaviness in your chest, neck, shoulders, back, or arms    Abnormal  shortness of breath    More joint or muscle pain    A very fast or irregular heartbeat (palpitations)  [employer] last reviewed this educational content on 7/1/2019 2000-2021 [employer]. All rights reserved. This information is not intended as a substitute for professional medical care. Always follow your healthcare professional's instructions.          Understanding USDA MyPlate  The USDA has guidelines to help you make healthy food choices. These are called MyPlate. MyPlate shows the food groups that make up healthy meals using the image of a place setting. Before you eat, think about the healthiest choices for what to put on your plate or in your cup or bowl. To learn more about building a healthy plate, visit www.choosemyplate.gov.    The food groups    Fruits. Any fruit or 100% fruit juice counts as part of the Fruit Group. Fruits may be fresh, canned, frozen, or dried, and may be whole, cut-up, or pureed. Make 1/2 of your plate fruits and vegetables.    Vegetables. Any vegetable or 100% vegetable juice counts as a member of the Vegetable Group. Vegetables may be fresh, frozen, canned, or dried. They can be served raw or cooked and may be whole, cut-up, or mashed. Make 1/2 of your plate fruits and vegetables.    Grains. All foods made from grains are part of the Grains Group. These include wheat, rice, oats, cornmeal, and barley. Grains are often used to make foods such as bread, pasta, oatmeal, cereal, tortillas, and grits. Grains should be no more than 1/4 of your plate. At least half of your grains should be whole grains.    Protein. This group includes meat, poultry, seafood, beans and peas, eggs, processed soy products (such as tofu), nuts (including nut butters), and seeds. Make protein choices no more than 1/4 of your plate. Meat and poultry choices should be lean or low fat.    Dairy. The Dairy Group includes all fluid milk products and foods made from milk that contain calcium, such as  yogurt and cheese. (Foods that have little calcium, such as cream, butter, and cream cheese, are not part of this group.) Most dairy choices should be low-fat or fat-free.    Oils. Oils aren't a food group, but they do contain essential nutrients. However it's important to watch your intake of oils. These are fats that are liquid at room temperature. They include canola, corn, olive, soybean, vegetable, and sunflower oil. Foods that are mainly oil include mayonnaise, certain salad dressings, and soft margarines. You likely already get your daily oil allowance from the foods you eat.  Things to limit  Eating healthy also means limiting these things in your diet:       Salt (sodium). Many processed foods have a lot of sodium. To keep sodium intake down, eat fresh vegetables, meats, poultry, and seafood when possible. Purchase low-sodium, reduced-sodium, or no-salt-added food products at the store. And don't add salt to your meals at home. Instead, season them with herbs and spices such as dill, oregano, cumin, and paprika. Or try adding flavor with lemon or lime zest and juice.    Saturated fat. Saturated fats are most often found in animal products such as beef, pork, and chicken. They are often solid at room temperature, such as butter. To reduce your saturated fat intake, choose leaner cuts of meat and poultry. And try healthier cooking methods such as grilling, broiling, roasting, or baking. For a simple lower-fat swap, use plain nonfat yogurt instead of mayonnaise when making potato salad or macaroni salad.    Added sugars. These are sugars added to foods. They are in foods such as ice cream, candy, soda, fruit drinks, sports drinks, energy drinks, cookies, pastries, jams, and syrups. Cut down on added sugars by sharing sweet treats with a family member or friend. You can also choose fruit for dessert, and drink water or other unsweetened beverages.     [employer] last reviewed this educational content on  6/1/2020 2000-2021 [employer]. All rights reserved. This information is not intended as a substitute for professional medical care. Always follow your healthcare professional's instructions.          Signs of Hearing Loss      Hearing much better with one ear can be a sign of hearing loss.   Hearing loss is a problem shared by many people. In fact, it is one of the most common health problems, particularly as people age. Most people age 65 and older have some hearing loss. By age 80, almost everyone does. Hearing loss often occurs slowly over the years. So you may not realize your hearing has gotten worse.  Have your hearing checked  Call your healthcare provider if you:    Have to strain to hear normal conversation    Have to watch other people s faces very carefully to follow what they re saying    Need to ask people to repeat what they ve said    Often misunderstand what people are saying    Turn the volume of the television or radio up so high that others complain    Feel that people are mumbling when they re talking to you    Find that the effort to hear leaves you feeling tired and irritated    Notice, when using the phone, that you hear better with one ear than the other  [employer] last reviewed this educational content on 1/1/2020 2000-2021 [employer]. All rights reserved. This information is not intended as a substitute for professional medical care. Always follow your healthcare professional's instructions.

## 2022-05-04 NOTE — PROGRESS NOTES
She is at risk for lack of exercise and has been provided with information to increase physical activity for the benefit of her well-being.  The patient was counseled and encouraged to consider modifying their diet and eating habits. She was provided with information on recommended healthy diet options.  The patient was provided with written information regarding signs of hearing loss.

## 2022-05-10 ENCOUNTER — TELEPHONE (OUTPATIENT)
Dept: GASTROENTEROLOGY | Facility: CLINIC | Age: 77
End: 2022-05-10
Payer: COMMERCIAL

## 2022-05-10 DIAGNOSIS — Z12.11 ENCOUNTER FOR SCREENING COLONOSCOPY: Primary | ICD-10-CM

## 2022-05-10 NOTE — TELEPHONE ENCOUNTER
Screening Questions  BlueKIND OF PREP RedLOCATION [review exclusion criteria] GreenSEDATION TYPE    1. Are you active on mychart? Y    2. Ordering/Referring Provider: Jolanta Wilkerson DO    3. What insurance is in the chart? [employer]ARE     4. Do you have a legal guardian or medical Power of ?  Are you able to give consent for your medical care? N   (Sedation review/consideration needed)    3.   BMI 34.11 [BMI OVER 40-EXTENDED PREP]  If greater than 40 review exclusion criteria [PAC APPT IF @ [facility]]      4. Have you had a positive covid test in the last 90 days? N     5.  Respiratory Screening :  [If yes to any of the following HOSPITAL setting only]     Do you use daily home oxygen? N  Do you have mod to severe Obstructive Sleep Apnea? N [OKAY @ [facility]]   Do you have Pulmonary Hypertension? N   Do you have UNCONTROLLED asthma? N      6.   Have you had a heart or lung transplant? N      7.   Are you currently on dialysis? N [ If yes, G-PREP & HOSPITAL setting only]     8.   Do you have chronic kidney disease? N[ If yes, G-PREP ]    9.   Have you had a stroke or Transient ischemic attack (TIA - aka  mini stroke ) within 6 months?  N(If yes, please review exclusion criteria)    10.   In the past 6 months, have you had any heart related issues including cardiomyopathy or heart attack? N          If yes, did it require cardiac stenting or other implantable device? N      11.   Do you have any implantable devices in your body (pacemaker, defib, LVAD)? N (If yes, please review exclusion criteria)    12.   Do you take nitroglycerin? N           If yes, how often? N  (if yes, HOSPITAL setting ONLY)    13.   Are you currently taking any blood thinners? N           [IF YES, INFORM PATIENT TO FOLLOW UP W/ ORDERING PROVIDER FOR BRIDGING INSTRUCTIONS]     14.   Do you have a diagnosis of diabetes? N [ If yes, G-PREP ]    15.   [FEMALES] Are you currently pregnant? N    If yes, how many weeks? N    16.    Are you taking any prescription pain medications on a routine schedule?  n [ If yes, EXTENDED PREP.] [If yes, MAC]    17.   Do you have any chemical dependencies such as alcohol, street drugs, or methadone?  N [If yes, MAC]    18.   Do you have any history of post-traumatic stress syndrome, severe anxiety or history of psychosis?  N [If yes, MAC]    19.   Do you transfer independently?  N    20.  On a regular basis do you go 3-5 days between bowel movements? N [ If yes, EXTENDED PREP.]    21.   Preferred LOCAL Pharmacy for Pre Prescription   [id number] [address] AT [address] [place] [address]      Scheduling Details      Caller :  [employer]   (Please ask for phone number if not scheduled by patient)    Type of Procedure Scheduled: Lower Endoscopy [Colonoscopy]  Which Colonoscopy Prep was Sent?: ashelyep    Surgeon: khushi  Date of Procedure: 10/10 8am  [place] [facility] [place]: CS    Conscious Sedation- Needs  for 6 hours after the procedure  MAC/General-Needs  for 24 hours after procedure    Pre-op Required at [facility] [place], [place] and OR for MAC sedation: n  (advise patient they will need a pre-op prior to procedure -)      Informed patient they will need an adult  y  Cannot take any type of public or medical transportation alone    Pre-Pro[facility] or Externally:  Sent reminder closer to date to call and set up    Confirmed Nurse will call to complete assessment y    Additional comments:

## 2022-05-10 NOTE — RESULT ENCOUNTER NOTE
Dear Harriet,   Your test results are all back -   -Cholesterol levels (LDL,HDL, Triglycerides) are normal.  ADVISE: rechecking in 1 year.   -Liver and gallbladder tests (ALT,AST, Alk phos,bilirubin) are normal.  -Kidney function (GFR) is normal.  -Sodium is normal.  -Potassium is normal.  -Calcium is normal.  -Glucose is slight elevated and may be a sign of early diabetes (prediabetes). ADVISE:: eating a low carbohydrate diet, exercising, trying to lose weight (if necessary) and rechecking your glucose level in 12 months.  -Microalbumin (urine protein) test is normal.  ADVISE: rechecking this annually.  Let us know if you have any questions.  -Jolanta Wilkerson, DO

## 2022-05-26 DIAGNOSIS — Z11.59 ENCOUNTER FOR SCREENING FOR OTHER VIRAL DISEASES: Primary | ICD-10-CM

## 2022-06-03 ENCOUNTER — ALLIED HEALTH/NURSE VISIT (OUTPATIENT)
Dept: FAMILY MEDICINE | Facility: CLINIC | Age: 77
End: 2022-06-03
Payer: COMMERCIAL

## 2022-06-03 DIAGNOSIS — Z23 NEED FOR COVID-19 VACCINE: Primary | ICD-10-CM

## 2022-06-03 PROCEDURE — 91306 COVID-19,PF,MODERNA (18+ YRS BOOSTER .25ML): CPT

## 2022-06-03 PROCEDURE — 0064A COVID-19,PF,MODERNA (18+ YRS BOOSTER .25ML): CPT

## 2022-06-03 PROCEDURE — 99207 PR NO CHARGE NURSE ONLY: CPT

## 2022-06-13 ENCOUNTER — MYC MEDICAL ADVICE (OUTPATIENT)
Dept: FAMILY MEDICINE | Facility: CLINIC | Age: 77
End: 2022-06-13
Payer: COMMERCIAL

## 2022-06-28 ENCOUNTER — OFFICE VISIT (OUTPATIENT)
Dept: FAMILY MEDICINE | Facility: CLINIC | Age: 77
End: 2022-06-28
Payer: COMMERCIAL

## 2022-06-28 ENCOUNTER — NURSE TRIAGE (OUTPATIENT)
Dept: NURSING | Facility: CLINIC | Age: 77
End: 2022-06-28

## 2022-06-28 VITALS
BODY MASS INDEX: 32.4 KG/M2 | DIASTOLIC BLOOD PRESSURE: 68 MMHG | RESPIRATION RATE: 16 BRPM | HEIGHT: 64 IN | HEART RATE: 56 BPM | WEIGHT: 189.8 LBS | TEMPERATURE: 97.7 F | OXYGEN SATURATION: 97 % | SYSTOLIC BLOOD PRESSURE: 115 MMHG

## 2022-06-28 DIAGNOSIS — R19.7 DIARRHEA OF PRESUMED INFECTIOUS ORIGIN: Primary | ICD-10-CM

## 2022-06-28 PROCEDURE — 99213 OFFICE O/P EST LOW 20 MIN: CPT | Performed by: FAMILY MEDICINE

## 2022-06-28 PROCEDURE — 87506 IADNA-DNA/RNA PROBE TQ 6-11: CPT | Performed by: FAMILY MEDICINE

## 2022-06-28 RX ORDER — AZITHROMYCIN 500 MG/1
TABLET, FILM COATED ORAL
Qty: 3 TABLET | Refills: 0 | Status: SHIPPED | OUTPATIENT
Start: 2022-06-28 | End: 2022-08-30

## 2022-06-28 ASSESSMENT — PAIN SCALES - GENERAL: PAINLEVEL: MILD PAIN (3)

## 2022-06-28 NOTE — TELEPHONE ENCOUNTER
"Triage Call:     Is this a 2nd Level Triage? YES, LICENSED PRACTITIONER REVIEW IS REQUIRED  Routed to provider    Situation:   Pt is calling due to ongoing diarrhea since last Wednesday, 6/22/2022  Last Wednesday patient had preprepared deli food  3-4 hours later she started having diarrhea and the chills    Next day she was OK, and get around, but slight chills  Diarrhea started again; on and off.    Pt now reports that she has been having diarrhea 10+ times a day and is \"gassy\"    Bananas   Staying hydrated  Drinking Gatorade    No recent antibiotics    Disposition: Go ot ED/[facility] not or office with PCP approval. Pt is going to the nearby [facility] today at 10am today. Pt was given the care advice for her sx. Writer is routing this message to patient's PCP as a 2nd level triage to advise if patient should go to the ED instead of the [facility]. The [facility] opens in 2 hours.     Does the patient meet one of the following criteria for ADS visit consideration? 16+ years old, with an [facility] PCP     TIP  Providers, please consider if this condition is appropriate for management at one of our Acute and Diagnostic Services sites.     If patient is a good candidate, please use dotphrase <dot>triageresponse and select Refer to ADS to document.    Liset Womack RN  [facility] Nurse Advisor 8:11 AM 6/28/2022         Reason for Disposition    SEVERE diarrhea (e.g., 7 or more times / day more than normal) and age > 60 years    Additional Information    Negative: Shock suspected (e.g., cold/pale/clammy skin, too weak to stand, low BP, rapid pulse)    Negative: Difficult to awaken or acting confused (e.g., disoriented, slurred speech)    Negative: Sounds like a life-threatening emergency to the triager    Negative: Vomiting also present and worse than the diarrhea    Negative: Blood in stool and without diarrhea    Negative: SEVERE abdominal pain (e.g., excruciating) and present > 1 hour    Negative: SEVERE abdominal pain and age > 60    " Negative: Bloody, black, or tarry bowel movements (Exception: chronic-unchanged black-grey bowel movements and is taking iron pills or Pepto-bismol)    Negative: Constant abdominal pain lasting > 2 hours    Negative: Drinking very little and has signs of dehydration (e.g., no urine > 12 hours, very dry mouth, very lightheaded)    Negative: Patient sounds very sick or weak to the triager    Negative: Fever > 101 F (38.3 C)    Negative: Blood in the stool    Protocols used: DIARRHEA-A-OH

## 2022-06-28 NOTE — NURSING NOTE
"Chief Complaint   Patient presents with     Diarrhea     /68   Pulse 56   Temp 97.7  F (36.5  C) (Temporal)   Resp 16   Ht 1.635 m (5' 4.37\")   Wt 86.1 kg (189 lb 12.8 oz)   SpO2 97%   BMI 32.21 kg/m   Estimated body mass index is 32.21 kg/m  as calculated from the following:    Height as of this encounter: 1.635 m (5' 4.37\").    Weight as of this encounter: 86.1 kg (189 lb 12.8 oz).  bp completed using cuff size: regular      Health Maintenance addressed:  NONE    n/a    Nichol Ford, RN, MA     "

## 2022-06-28 NOTE — TELEPHONE ENCOUNTER
Huddled with PCP, ok to put in 10:00 am spot (travel schedule). Spoke with patient and she will come in today for assessment.    Thank you,  Cris Felipe RN  [place]

## 2022-06-28 NOTE — PROGRESS NOTES
Assessment & Plan     Diarrhea of presumed infectious origin  Diarrhea for 7 days   Started after eating out - chili at a Deli  She has continue to have multiple loose stools daily -   Has lost almost 12 pounds in the week  No melena or hematochezia  Will check stool studies - suspect bacteria  Plan to treat with zithromax daily for 3 days  Discussed also starting probiotic  Pt will call or RTC if symptoms worsen or do not improve.   - Enteric Bacteria and Virus Panel by ABHISHEK Stool; Future  - azithromycin (ZITHROMAX) 500 MG tablet; Take one tablet daily for up to 3 days as needed for diarrhea  - Enteric Bacteria and Virus Panel by ABHISHEK Stool                 No follow-ups on file.    Jolanta CHRISTOS Wilkerson, [facility] is a 77 year old, presenting for the following health issues:  Diarrhea      History of Present Illness       Reason for visit:  Diarrhea  Symptom intensity:  Severe  Symptom progression:  Staying the same  Had these symptoms before:  No    She eats 2-3 servings of fruits and vegetables daily.She consumes 4 sweetened beverage(s) daily.She exercises with enough effort to increase her heart rate 10 to 19 minutes per day.  She exercises with enough effort to increase her heart rate 3 or less days per week. She is missing 7 dose(s) of medications per week.       Diarrhea  Onset/Duration: last Wednesday  Description:       Consistency of stool: watery, loose and explosive       Blood in stool: no       Number of loose stools past 24 hours: 20  Progression of Symptoms: same  Accompanying signs and symptoms:       Fever: no       Nausea/Vomiting: no       Abdominal pain: YES       Weight loss: YES       Episodes of constipation: no  History   Ill contacts: no  Recent use of antibiotics: no  Recent travels: no  Recent medication-new or changes(Rx or OTC): no  Precipitating or alleviating factors: Ate food from deli  Therapies tried and outcome: bananas and toast, did  "not help, she is drinking water to stay hydrated    Started after eating chili at Deli -   3 hours later started having diarrhea  Since that time has persisted  During daytime not as bothersome but at night worsens  Has been trying to stay hydrated  Persisting -   Has been eating BRAT - bananas, toast, clear broth and rice  Weight down 12 pounds since visit in May            Review of Systems   Constitutional, HEENT, cardiovascular, pulmonary, gi and gu systems are negative, except as otherwise noted.      Objective    /68   Pulse 56   Temp 97.7  F (36.5  C) (Temporal)   Resp 16   Ht 1.635 m (5' 4.37\")   Wt 86.1 kg (189 lb 12.8 oz)   SpO2 97%   BMI 32.21 kg/m    Body mass index is 32.21 kg/m .  Physical Exam   GENERAL: healthy, alert and no distress  ABDOMEN: hyperactive bowel sounds   Soft with some upper abdominal tenderness but no g/r/r                    .  ..  "

## 2022-06-28 NOTE — TELEPHONE ENCOUNTER
Provider Recommendation Follow Up:     Pt will be seen in clinic per notes.     Liset Womack RN  [facility] Nurse Advisor 9:06 AM 6/28/2022

## 2022-07-05 NOTE — RESULT ENCOUNTER NOTE
Dear Harriet,   Your test results are all back -   Stool tests for bacteria was negative.  Lets us know if you continue to have diarrhea.  Let us know if you have any questions.  -Jolanta Wilkerson, DO

## 2022-08-24 ENCOUNTER — MYC MEDICAL ADVICE (OUTPATIENT)
Dept: FAMILY MEDICINE | Facility: CLINIC | Age: 77
End: 2022-08-24

## 2022-08-26 NOTE — TELEPHONE ENCOUNTER
PN,  Please see below MyChart message and advise.  Do not see any evidence or documentation of liver disease in recent notes/visits.  Thanks,  Madeline MACHADO RN

## 2022-08-30 ENCOUNTER — OFFICE VISIT (OUTPATIENT)
Dept: DERMATOLOGY | Facility: CLINIC | Age: 77
End: 2022-08-30
Payer: COMMERCIAL

## 2022-08-30 DIAGNOSIS — D22.9 MULTIPLE BENIGN NEVI: Primary | ICD-10-CM

## 2022-08-30 DIAGNOSIS — L82.1 SEBORRHEIC KERATOSIS: ICD-10-CM

## 2022-08-30 DIAGNOSIS — L91.8 SKIN TAG: ICD-10-CM

## 2022-08-30 DIAGNOSIS — L82.1 DERMATOSIS PAPULOSA NIGRA: ICD-10-CM

## 2022-08-30 DIAGNOSIS — D18.01 CHERRY ANGIOMA: ICD-10-CM

## 2022-08-30 DIAGNOSIS — Z12.83 SKIN EXAM, SCREENING FOR CANCER: ICD-10-CM

## 2022-08-30 PROCEDURE — 99203 OFFICE O/P NEW LOW 30 MIN: CPT | Mod: 25 | Performed by: DERMATOLOGY

## 2022-08-30 PROCEDURE — 17110 DESTRUCTION B9 LES UP TO 14: CPT | Mod: GC | Performed by: DERMATOLOGY

## 2022-08-30 ASSESSMENT — PAIN SCALES - GENERAL: PAINLEVEL: NO PAIN (0)

## 2022-08-30 NOTE — NURSING NOTE
Dermatology Rooming Note    Harriet Lance's goals for this visit include:   Chief Complaint   Patient presents with     Skin Check     Harriet is here for a full body skin check.       Devorah Aguirre CMA

## 2022-08-30 NOTE — PATIENT INSTRUCTIONS
Cryotherapy    What is it?  Use of a very cold liquid, such as liquid nitrogen, to freeze and destroy abnormal skin cells that need to be removed    What should I expect?  Tenderness and redness  A small blister that might grow and fill with dark purple blood. There may be crusting.  More than one treatment may be needed if the lesions do not go away.    How do I care for the treated area?  Gently wash the area with your hands when bathing.  Use a thin layer of Vaseline to help with healing. You may use a Band-Aid.   The area should heal within 7-10 days and may leave behind a pink or lighter color.   Do not use an antibiotic or Neosporin ointment.   You may take acetaminophen (Tylenol) for pain.     Call your doctor if you have:  Severe pain  Signs of infection (warmth, redness, cloudy yellow drainage, and or a bad smell)  Questions or concerns    Who should I call with questions?      [facility]: [phone]      [facility]: [phone]      For urgent needs outside of business hours call the [facility] at [phone] and ask for the dermatology resident on call

## 2022-08-30 NOTE — LETTER
8/30/2022       RE: Harriet Lance  [address] [facility] [id number]     Dear Colleague,    Thank you for referring your patient, Harriet Lance, to the [facility] [place] at [facility]. Please see a copy of my visit note below.    [facility] Dermatology Note  Encounter Date: Aug 30, 2022  Office Visit     Dermatology Problem List:  1. Hx Colon cancer, status post resection and chemotherapy.  2. Inflamed skin tags, R neck and R arm, s/p cryo 8/30/22  ____________________________________________    Assessment & Plan:    # Inflamed skin tags, R neck and R arm  Discussed options for management including cryotherapy versus shave removal, and patient elected to proceed with cryotherapy.  - Cryotherapy performed today (see procedure note(s) below).    # Benign skin findings - seborrheic keratoses, benign nevi, dermatosis papulosa nigra.   - Reassured patient of benign skin findings. No need for treatment.    Procedures Performed:   - Cryotherapy procedure note, location(s): R neck, R arm. After verbal consent and discussion of risks and benefits including, but not limited to, dyspigmentation/scar, blister, and pain, 2 lesion(s) was(were) treated with 1-2 mm freeze border for 1-2 cycles with liquid nitrogen. Post cryotherapy instructions were provided.    Follow-up: 1 year, or sooner for new or changing lesions    Staff and Resident:    Cheyenne Smith MD  PGY2 Dermatology Resident    Staff Physician Comments:   I saw and evaluated the patient with the resident and I agree with the assessment and plan.  I was present for the entire minor procedure and examination. I have made edits if needed.    Fredis Galvan MD  Staff Dermatologist and Dermatopathologist  , Department of Dermatology   ____________________________________________    CC: Skin Check (Harriet is here for a full body skin  check.  )    HPI:  Ms. Harriet Lance is a(n) 77 year old female who presents today as a new patient for a FBSE. Last seen by Dr. Lazo on 2/18/20, at which time patient had no lesions of concern.    Harriet states that she has a few moles on her back that she would like further evaluated, as they appear to be more raised and are growing in this area. She wants to ensure that none of these moles are concerning. Additionally, she reports having quite a few skin tags, and has one on the right side of her neck and underneath the right arm that catches on necklaces and clothing, respectively, that she would like removed.     No history of blistering sunburns. No personal or familial history of any skin cancer including melanoma. She infrequently wears sunscreen.    Patient is otherwise feeling well, without additional skin concerns.    Labs Reviewed:  N/A    Physical Exam:  Vitals: There were no vitals taken for this visit.  SKIN: Full skin, which includes the head/face, both arms, chest, back, abdomen,both legs, genitalia and/or groin buttocks, digits and/or nails, was examined.   - There are dome shaped bright red papules on the bilateral arms.   - Multiple regular brown pigmented macules and papules are identified on the face, arms, legs, and trunk.   - There are waxy stuck on tan to brown papules on the face, arms, legs, and trunk.   - Numerous flesh colored pedunculated papules are noted around the neck, bilateral axillae, and inframammary folds.   - No other lesions of concern on areas examined.     Medications:  Current Outpatient Medications   Medication     amLODIPine (NORVASC) 10 MG tablet     ASPIRIN 81 MG OR TABS     Coenzyme Q10 (CO Q10) 100 MG CAPS     FISH OIL 1000 MG OR CAPS     hydrochlorothiazide (HYDRODIURIL) 25 MG tablet     losartan (COZAAR) 25 MG tablet     metoprolol succinate ER (TOPROL-XL) 50 MG 24 hr tablet     Multiple Vitamins-Minerals (I-DARBY) TABS     pravastatin (PRAVACHOL) 40 MG  tablet     vitamin D3 (CHOLECALCIFEROL) 2000 units tablet     ZYRTEC 10 MG OR TABS     No current facility-administered medications for this visit.      Past Medical History:   Patient Active Problem List   Diagnosis     Allergic rhinitis     Diffuse cystic mastopathy     History of malignant neoplasm of large intestine     Enlarged lymph nodes     CARDIOVASCULAR SCREENING; LDL GOAL LESS THAN 100     Borderline glaucoma (glaucoma suspect)     Hypertension goal BP (blood pressure) < 140/90     Advanced directives, counseling/discussion     Arthritis of hip     HL (hearing loss)     Hyperlipidemia with target LDL less than 130     BMI 37.0-37.9, adult     Benign neoplasm of colon     Stage 3 chronic kidney disease, unspecified whether stage 3a or 3b CKD (H)     Past Medical History:   Diagnosis Date     Allergic rhinitis, cause unspecified      Chronic renal disease     normal creatinine since 2011     Colon cancer (H) 1998    chemotherapy     Diffuse cystic mastopathy     calcifications on the right breast - followed closely     Hypertension      Personal history of malignant neoplasm of large intestine 1998    had chemotherapy after colon resection -      Unspecified glaucoma(365.9) 2005    Early - followed by  Opthamologist - both eyes     CC Jolanta Wilkerson,   [address] [address] on close of this encounter.

## 2022-08-30 NOTE — PROGRESS NOTES
[facility] Dermatology Note  Encounter Date: Aug 30, 2022  Office Visit     Dermatology Problem List:  1. Hx Colon cancer, status post resection and chemotherapy.  2. Inflamed skin tags, R neck and R arm, s/p cryo 8/30/22  ____________________________________________    Assessment & Plan:    # Inflamed skin tags, R neck and R arm  Discussed options for management including cryotherapy versus shave removal, and patient elected to proceed with cryotherapy.  - Cryotherapy performed today (see procedure note(s) below).    # Benign skin findings - seborrheic keratoses, benign nevi, dermatosis papulosa nigra.   - Reassured patient of benign skin findings. No need for treatment.    Procedures Performed:   - Cryotherapy procedure note, location(s): R neck, R arm. After verbal consent and discussion of risks and benefits including, but not limited to, dyspigmentation/scar, blister, and pain, 2 lesion(s) was(were) treated with 1-2 mm freeze border for 1-2 cycles with liquid nitrogen. Post cryotherapy instructions were provided.    Follow-up: 1 year, or sooner for new or changing lesions    Staff and Resident:    Cheyenne Smith MD  PGY2 Dermatology Resident    Staff Physician Comments:   I saw and evaluated the patient with the resident and I agree with the assessment and plan.  I was present for the entire minor procedure and examination. I have made edits if needed.    Fredis Galvan MD  Staff Dermatologist and Dermatopathologist  , Department of Dermatology   ____________________________________________    CC: Skin Check (Harriet is here for a full body skin check.  )    HPI:  Ms. Harriet Lance is a(n) 77 year old female who presents today as a new patient for a FBSE. Last seen by Dr. Lazo on 2/18/20, at which time patient had no lesions of concern.    Harriet states that she has a few moles on her back that she would like further evaluated, as they appear to be more raised  and are growing in this area. She wants to ensure that none of these moles are concerning. Additionally, she reports having quite a few skin tags, and has one on the right side of her neck and underneath the right arm that catches on necklaces and clothing, respectively, that she would like removed.     No history of blistering sunburns. No personal or familial history of any skin cancer including melanoma. She infrequently wears sunscreen.    Patient is otherwise feeling well, without additional skin concerns.    Labs Reviewed:  N/A    Physical Exam:  Vitals: There were no vitals taken for this visit.  SKIN: Full skin, which includes the head/face, both arms, chest, back, abdomen,both legs, genitalia and/or groin buttocks, digits and/or nails, was examined.   - There are dome shaped bright red papules on the bilateral arms.   - Multiple regular brown pigmented macules and papules are identified on the face, arms, legs, and trunk.   - There are waxy stuck on tan to brown papules on the face, arms, legs, and trunk.   - Numerous flesh colored pedunculated papules are noted around the neck, bilateral axillae, and inframammary folds.   - No other lesions of concern on areas examined.     Medications:  Current Outpatient Medications   Medication     amLODIPine (NORVASC) 10 MG tablet     ASPIRIN 81 MG OR TABS     Coenzyme Q10 (CO Q10) 100 MG CAPS     FISH OIL 1000 MG OR CAPS     hydrochlorothiazide (HYDRODIURIL) 25 MG tablet     losartan (COZAAR) 25 MG tablet     metoprolol succinate ER (TOPROL-XL) 50 MG 24 hr tablet     Multiple Vitamins-Minerals (I-DARBY) TABS     pravastatin (PRAVACHOL) 40 MG tablet     vitamin D3 (CHOLECALCIFEROL) 2000 units tablet     ZYRTEC 10 MG OR TABS     No current facility-administered medications for this visit.      Past Medical History:   Patient Active Problem List   Diagnosis     Allergic rhinitis     Diffuse cystic mastopathy     History of malignant neoplasm of large intestine      Enlarged lymph nodes     CARDIOVASCULAR SCREENING; LDL GOAL LESS THAN 100     Borderline glaucoma (glaucoma suspect)     Hypertension goal BP (blood pressure) < 140/90     Advanced directives, counseling/discussion     Arthritis of hip     HL (hearing loss)     Hyperlipidemia with target LDL less than 130     BMI 37.0-37.9, adult     Benign neoplasm of colon     Stage 3 chronic kidney disease, unspecified whether stage 3a or 3b CKD (H)     Past Medical History:   Diagnosis Date     Allergic rhinitis, cause unspecified      Chronic renal disease     normal creatinine since 2011     Colon cancer (H) 1998    chemotherapy     Diffuse cystic mastopathy     calcifications on the right breast - followed closely     Hypertension      Personal history of malignant neoplasm of large intestine 1998    had chemotherapy after colon resection -      Unspecified glaucoma(365.9) 2005    Early - followed by  Opthamologist - both eyes     CC Jolanta Wilkerson, DO  [address] [address] on close of this encounter.

## 2022-09-16 RX ORDER — BISACODYL 5 MG/1
TABLET, DELAYED RELEASE ORAL
Qty: 4 TABLET | Refills: 0 | Status: SHIPPED | OUTPATIENT
Start: 2022-09-16 | End: 2023-03-06

## 2022-09-30 ENCOUNTER — TELEPHONE (OUTPATIENT)
Dept: GASTROENTEROLOGY | Facility: CLINIC | Age: 77
End: 2022-09-30

## 2022-09-30 NOTE — TELEPHONE ENCOUNTER
Attempted to contact patient regarding upcoming colonoscopy procedure on 10.10.2022 for pre assessment questions. No answer.     Left message to return call to [phone] #4    COVID test 10.6.2022    Arrival time: 0800    Facility location: [facility]    Sedation type: cs    Indication for procedure: screening colonoscopy; hx of CRC    Bowel prep recommendation: Annette d/t CKD    Prep instructions sent via [employer] on 9.16.2022.  Prep prescription sent to [facility] #[id number] - [address] AT [address] [place] [address] on 9.16.2022    Viviana Caldwell RN

## 2022-10-05 NOTE — TELEPHONE ENCOUNTER
Pre assessment questions completed for upcoming colonoscopy procedure scheduled on 10.10.22    Discussed at home rapid antigen COVID test 1-2 days prior to procedure.    Reviewed procedural arrival time 0800 and facility location [facility].    Designated  policy reviewed. Instructed to have someone stay 6 hours post procedure.     Anticoagulation/blood thinners? No    Electronic implanted devices? No    Reviewed standard golytely prep instructions with patient. No fiber/iron supplements or foods that contain nuts/seeds prior to procedure.     Patient verbalized understanding and had no questions or concerns at this time.    Pt requests Colonoscopy prep communication via unencrypted e-mail.  Pt acknowledges that they have agreed to accept the risks and receive unencrypted communications for this incidence.    Rosa Maria Carmichael RN

## 2022-10-06 ENCOUNTER — LAB (OUTPATIENT)
Dept: LAB | Facility: CLINIC | Age: 77
End: 2022-10-06
Attending: FAMILY MEDICINE
Payer: COMMERCIAL

## 2022-10-06 DIAGNOSIS — Z01.818 PRE-OPERATIVE GENERAL PHYSICAL EXAMINATION: ICD-10-CM

## 2022-10-06 LAB — SARS-COV-2 RNA RESP QL NAA+PROBE: NEGATIVE

## 2022-10-06 PROCEDURE — U0005 INFEC AGEN DETEC AMPLI PROBE: HCPCS

## 2022-10-06 PROCEDURE — U0003 INFECTIOUS AGENT DETECTION BY NUCLEIC ACID (DNA OR RNA); SEVERE ACUTE RESPIRATORY SYNDROME CORONAVIRUS 2 (SARS-COV-2) (CORONAVIRUS DISEASE [COVID-19]), AMPLIFIED PROBE TECHNIQUE, MAKING USE OF HIGH THROUGHPUT TECHNOLOGIES AS DESCRIBED BY CMS-2020-01-R: HCPCS

## 2022-10-09 ENCOUNTER — HEALTH MAINTENANCE LETTER (OUTPATIENT)
Age: 77
End: 2022-10-09

## 2022-10-10 ENCOUNTER — HOSPITAL ENCOUNTER (OUTPATIENT)
Facility: AMBULATORY SURGERY CENTER | Age: 77
Discharge: HOME | End: 2022-10-10
Attending: INTERNAL MEDICINE | Admitting: INTERNAL MEDICINE
Payer: COMMERCIAL

## 2022-10-10 VITALS
DIASTOLIC BLOOD PRESSURE: 60 MMHG | SYSTOLIC BLOOD PRESSURE: 122 MMHG | HEIGHT: 64 IN | RESPIRATION RATE: 14 BRPM | WEIGHT: 180 LBS | TEMPERATURE: 97.2 F | OXYGEN SATURATION: 97 % | HEART RATE: 50 BPM | BODY MASS INDEX: 30.73 KG/M2

## 2022-10-10 LAB — COLONOSCOPY: NORMAL

## 2022-10-10 PROCEDURE — 45380 COLONOSCOPY AND BIOPSY: CPT | Mod: 59,PT

## 2022-10-10 PROCEDURE — 45385 COLONOSCOPY W/LESION REMOVAL: CPT | Mod: PT

## 2022-10-10 PROCEDURE — 88305 TISSUE EXAM BY PATHOLOGIST: CPT | Mod: 26 | Performed by: PATHOLOGY

## 2022-10-10 PROCEDURE — 88305 TISSUE EXAM BY PATHOLOGIST: CPT | Mod: TC | Performed by: INTERNAL MEDICINE

## 2022-10-10 RX ORDER — NALOXONE HYDROCHLORIDE 0.4 MG/ML
0.2 INJECTION, SOLUTION INTRAMUSCULAR; INTRAVENOUS; SUBCUTANEOUS
Status: CANCELLED | OUTPATIENT
Start: 2022-10-10

## 2022-10-10 RX ORDER — FLUMAZENIL 0.1 MG/ML
0.2 INJECTION, SOLUTION INTRAVENOUS
Status: CANCELLED | OUTPATIENT
Start: 2022-10-10 | End: 2022-10-10

## 2022-10-10 RX ORDER — SODIUM CHLORIDE, SODIUM LACTATE, POTASSIUM CHLORIDE, CALCIUM CHLORIDE 600; 310; 30; 20 MG/100ML; MG/100ML; MG/100ML; MG/100ML
INJECTION, SOLUTION INTRAVENOUS CONTINUOUS
Status: DISCONTINUED | OUTPATIENT
Start: 2022-10-10 | End: 2022-10-11 | Stop reason: HOSPADM

## 2022-10-10 RX ORDER — NALOXONE HYDROCHLORIDE 0.4 MG/ML
0.4 INJECTION, SOLUTION INTRAMUSCULAR; INTRAVENOUS; SUBCUTANEOUS
Status: CANCELLED | OUTPATIENT
Start: 2022-10-10

## 2022-10-10 RX ORDER — ONDANSETRON 2 MG/ML
4 INJECTION INTRAMUSCULAR; INTRAVENOUS
Status: DISCONTINUED | OUTPATIENT
Start: 2022-10-10 | End: 2022-10-11 | Stop reason: HOSPADM

## 2022-10-10 RX ORDER — ONDANSETRON 2 MG/ML
4 INJECTION INTRAMUSCULAR; INTRAVENOUS EVERY 6 HOURS PRN
Status: CANCELLED | OUTPATIENT
Start: 2022-10-10

## 2022-10-10 RX ORDER — LIDOCAINE 40 MG/G
CREAM TOPICAL
Status: DISCONTINUED | OUTPATIENT
Start: 2022-10-10 | End: 2022-10-11 | Stop reason: HOSPADM

## 2022-10-10 RX ORDER — PROCHLORPERAZINE MALEATE 5 MG
5 TABLET ORAL EVERY 6 HOURS PRN
Status: CANCELLED | OUTPATIENT
Start: 2022-10-10

## 2022-10-10 RX ORDER — ONDANSETRON 4 MG/1
4 TABLET, ORALLY DISINTEGRATING ORAL EVERY 6 HOURS PRN
Status: CANCELLED | OUTPATIENT
Start: 2022-10-10

## 2022-10-10 RX ORDER — FENTANYL CITRATE 50 UG/ML
INJECTION, SOLUTION INTRAMUSCULAR; INTRAVENOUS PRN
Status: DISCONTINUED | OUTPATIENT
Start: 2022-10-10 | End: 2022-10-10 | Stop reason: HOSPADM

## 2022-10-10 RX ADMIN — SODIUM CHLORIDE, SODIUM LACTATE, POTASSIUM CHLORIDE, CALCIUM CHLORIDE: 600; 310; 30; 20 INJECTION, SOLUTION INTRAVENOUS at 08:43

## 2022-10-10 NOTE — H&P
Harriet Lance  [id number]  female  77 year old      Reason for procedure/surgery: colon cancer surveillance    Patient Active Problem List   Diagnosis     Allergic rhinitis     Diffuse cystic mastopathy     History of malignant neoplasm of large intestine     Enlarged lymph nodes     CARDIOVASCULAR SCREENING; LDL GOAL LESS THAN 100     Borderline glaucoma (glaucoma suspect)     Hypertension goal BP (blood pressure) < 140/90     Advanced directives, counseling/discussion     Arthritis of hip     HL (hearing loss)     Hyperlipidemia with target LDL less than 130     BMI 37.0-37.9, adult     Benign neoplasm of colon     Stage 3 chronic kidney disease, unspecified whether stage 3a or 3b CKD (H)       Past Surgical History:    Past Surgical History:   Procedure Laterality Date     AS TOTAL HIP ARTHROPLASTY Right 2017    done at [facility]     BIOPSY  1975    Age 30     ORTHOPEDIC SURGERY  ,     ZZC REMOVAL COLON/ILEOSTOMY  1998    8 inches of colon removed - not clear what section       Past Medical History:   Past Medical History:   Diagnosis Date     Allergic rhinitis, cause unspecified      Chronic renal disease     normal creatinine since      Colon cancer (H)     chemotherapy     Diffuse cystic mastopathy     calcifications on the right breast - followed closely     Hypertension      Personal history of malignant neoplasm of large intestine     had chemotherapy after colon resection -      Unspecified glaucoma(365.9)     Early - followed by  Opthamologist - both eyes       Social History:   Social History     Tobacco Use     Smoking status: Never     Smokeless tobacco: Never   Substance Use Topics     Alcohol use: Never     Comment: rarely       Family History:   Family History   Problem Relation Age of Onset     Heart Disease Mother          of heart Attack at age 75     Hypertension Mother      Cancer Father         Prostate     Diabetes Father      Prostate Cancer Father       Allergies Father      Cancer - colorectal Maternal Grandmother         Colon Resection     Breast Cancer Maternal Grandmother      Cancer - colorectal Maternal Uncle      Glaucoma No family hx of      Macular Degeneration No family hx of      Melanoma No family hx of      Skin Cancer No family hx of        Allergies:   Allergies   Allergen Reactions     Hay Fever & [A.R.M.]      Amoxicillin Rash     Onset of raised red rash on torso and arms. Face blotchy and throat scratchy.  Noted on 10/4/16     Codeine Rash     Unclear if this is a reaction to amoxicillin or codeine.       Active Medications:   Current Outpatient Medications   Medication Sig Dispense Refill     amLODIPine (NORVASC) 10 MG tablet Take 1 tablet (10 mg) by mouth daily 90 tablet 3     ASPIRIN 81 MG OR TABS 1 tab po QD (Once per day) 100 3     bisacodyl (DULCOLAX) 5 MG EC tablet Take as directed. One day before exam take 2 tablets at 3 PM. Take 2 tablets at 11 PM. 4 tablet 0     Coenzyme Q10 (CO Q10) 100 MG CAPS  30 capsule      FISH OIL 1000 MG OR CAPS 2 capsules once per day  0     hydrochlorothiazide (HYDRODIURIL) 25 MG tablet Take 1 tablet (25 mg) by mouth daily 90 tablet 3     losartan (COZAAR) 25 MG tablet Take 1 tablet (25 mg) by mouth daily 90 tablet 3     metoprolol succinate ER (TOPROL-XL) 50 MG 24 hr tablet TAKE 1 TABLET BY MOUTH IN THE MORNING AND IN THE EVENING 180 tablet 3     Multiple Vitamins-Minerals (I-DARBY) TABS Take 2 tablets by mouth daily 30 tablet 0     polyethylene glycol (GOLYTELY) 236 g suspension Take as directed. One day before exam fill the jug with water. Cover and shake until well mixed. At 6 PM start drinking an 8oz glass of mixture every 15 minutes until jug is 1/2 empty. Store remainder in the refrigerator.  At 11 PM Start drinking the other half of the Golytely jug. Drink one 8-ounce glass every 15 minutes until the jug is empty. 4000 mL 0     pravastatin (PRAVACHOL) 40 MG tablet Take 1 tablet (40 mg) by mouth  "daily 90 tablet 3     vitamin D3 (CHOLECALCIFEROL) 2000 units tablet Take 1 tablet by mouth daily 30 tablet 0     ZYRTEC 10 MG OR TABS 1 TABLET DAILY 30 11       Systemic Review:   CONSTITUTIONAL: NEGATIVE for fever, chills, change in weight  ENT/MOUTH: NEGATIVE for ear, mouth and throat problems  RESP: NEGATIVE for significant cough or SOB  CV: NEGATIVE for chest pain, palpitations or peripheral edema    Physical Examination:   Vital Signs: /62 (BP Location: Right arm)   Pulse 58   Temp 97.5  F (36.4  C) (Temporal)   Resp 18   Ht 1.626 m (5' 4\")   Wt 81.6 kg (180 lb)   SpO2 98%   BMI 30.90 kg/m    GENERAL: healthy, alert and no distress  NECK: no adenopathy, no asymmetry, masses, or scars  RESP: lungs clear to auscultation - no rales, rhonchi or wheezes  CV: regular rate and rhythm, normal S1 S2, no S3 or S4, no murmur, click or rub, no peripheral edema and peripheral pulses strong  ABDOMEN: soft, nontender, no hepatosplenomegaly, no masses and bowel sounds normal  MS: no gross musculoskeletal defects noted, no edema    Plan: Appropriate to proceed as scheduled.      Dianna La MD  10/10/2022    PCP:  Jolanta Wilkerson    "

## 2022-10-11 LAB
PATH REPORT.COMMENTS IMP SPEC: NORMAL
PATH REPORT.COMMENTS IMP SPEC: NORMAL
PATH REPORT.FINAL DX SPEC: NORMAL
PATH REPORT.GROSS SPEC: NORMAL
PATH REPORT.MICROSCOPIC SPEC OTHER STN: NORMAL
PATH REPORT.RELEVANT HX SPEC: NORMAL
PHOTO IMAGE: NORMAL

## 2022-10-18 ENCOUNTER — MYC MEDICAL ADVICE (OUTPATIENT)
Dept: FAMILY MEDICINE | Facility: CLINIC | Age: 77
End: 2022-10-18

## 2022-10-19 NOTE — TELEPHONE ENCOUNTER
P.N.,  Please see below [employer] message and advise.    Per 10/10/22 notation:   Polyps retrieved  Repeat colonoscopy in 3-5 years    Thanks,  Evelia HURLEY RN

## 2022-11-05 ENCOUNTER — IMMUNIZATION (OUTPATIENT)
Dept: FAMILY MEDICINE | Facility: CLINIC | Age: 77
End: 2022-11-05
Payer: COMMERCIAL

## 2022-11-05 DIAGNOSIS — Z23 NEED FOR PROPHYLACTIC VACCINATION AND INOCULATION AGAINST INFLUENZA: Primary | ICD-10-CM

## 2022-11-05 PROCEDURE — 90662 IIV NO PRSV INCREASED AG IM: CPT

## 2022-11-05 PROCEDURE — G0008 ADMIN INFLUENZA VIRUS VAC: HCPCS

## 2022-11-05 PROCEDURE — 99207 PR NO CHARGE NURSE ONLY: CPT

## 2022-12-05 ENCOUNTER — IMMUNIZATION (OUTPATIENT)
Dept: FAMILY MEDICINE | Facility: CLINIC | Age: 77
End: 2022-12-05
Payer: COMMERCIAL

## 2022-12-05 DIAGNOSIS — Z23 NEED FOR VACCINATION: Primary | ICD-10-CM

## 2022-12-05 PROCEDURE — 0134A COVID-19 VACCINE BIVALENT BOOSTER 18+ (MODERNA): CPT

## 2022-12-05 PROCEDURE — 91313 COVID-19 VACCINE BIVALENT BOOSTER 18+ (MODERNA): CPT

## 2022-12-05 NOTE — PROGRESS NOTES
Prior to immunization administration, verified patients identity using patient s name and date of birth. Please see Immunization Activity for additional information.     Screening Questionnaire for Adult Immunization    Are you sick today?   No   Do you have allergies to medications, food, a vaccine component or latex?   No   Have you ever had a serious reaction after receiving a vaccination?   No   Do you have a long-term health problem with heart, lung, kidney, or metabolic disease (e.g., diabetes), asthma, a blood disorder, no spleen, complement component deficiency, a cochlear implant, or a spinal fluid leak?  Are you on long-term aspirin therapy?   No   Do you have cancer, leukemia, HIV/AIDS, or any other immune system problem?   No   Do you have a parent, brother, or sister with an immune system problem?   No   In the past 3 months, have you taken medications that affect  your immune system, such as prednisone, other steroids, or anticancer drugs; drugs for the treatment of rheumatoid arthritis, Crohn s disease, or psoriasis; or have you had radiation treatments?   No   Have you had a seizure, or a brain or other nervous system problem?   No   During the past year, have you received a transfusion of blood or blood    products, or been given immune (gamma) globulin or antiviral drug?   No   For women: Are you pregnant or is there a chance you could become       pregnant during the next month?   No   Have you received any vaccinations in the past 4 weeks?   No     Immunization questionnaire answers were all negative.        Per orders of Dr. Medina, injection of Bivalent Moderna given by Balbina Arroyo CMA. Patient instructed to remain in clinic for 15 minutes afterwards, and to report any adverse reaction to me immediately.       Screening performed by Balbina Arroyo CMA on 12/5/2022 at 9:57 AM.

## 2023-01-09 ENCOUNTER — ANCILLARY PROCEDURE (OUTPATIENT)
Dept: MAMMOGRAPHY | Facility: CLINIC | Age: 78
End: 2023-01-09
Payer: COMMERCIAL

## 2023-01-09 DIAGNOSIS — Z12.31 ENCOUNTER FOR SCREENING MAMMOGRAM FOR BREAST CANCER: ICD-10-CM

## 2023-01-09 PROCEDURE — 77063 BREAST TOMOSYNTHESIS BI: CPT | Mod: GC

## 2023-01-09 PROCEDURE — 77067 SCR MAMMO BI INCL CAD: CPT | Mod: GC

## 2023-01-23 ENCOUNTER — ANCILLARY PROCEDURE (OUTPATIENT)
Dept: MAMMOGRAPHY | Facility: CLINIC | Age: 78
End: 2023-01-23
Attending: FAMILY MEDICINE
Payer: COMMERCIAL

## 2023-01-23 DIAGNOSIS — R92.8 ABNORMAL MAMMOGRAM OF LEFT BREAST: ICD-10-CM

## 2023-01-23 PROCEDURE — 77065 DX MAMMO INCL CAD UNI: CPT | Mod: LT

## 2023-01-23 PROCEDURE — G0279 TOMOSYNTHESIS, MAMMO: HCPCS | Mod: LT

## 2023-03-06 ENCOUNTER — OFFICE VISIT (OUTPATIENT)
Dept: OPHTHALMOLOGY | Facility: CLINIC | Age: 78
End: 2023-03-06
Payer: COMMERCIAL

## 2023-03-06 VITALS — WEIGHT: 180 LBS | BODY MASS INDEX: 30.73 KG/M2 | HEIGHT: 64 IN

## 2023-03-06 DIAGNOSIS — H40.003 GLAUCOMA SUSPECT OF BOTH EYES: ICD-10-CM

## 2023-03-06 DIAGNOSIS — H02.886 MEIBOMIAN GLAND DYSFUNCTION (MGD) OF BOTH EYES: ICD-10-CM

## 2023-03-06 DIAGNOSIS — H52.203 MYOPIC ASTIGMATISM OF BOTH EYES: ICD-10-CM

## 2023-03-06 DIAGNOSIS — H40.003 GLAUCOMA SUSPECT OF BOTH EYES: Primary | ICD-10-CM

## 2023-03-06 DIAGNOSIS — H52.13 MYOPIC ASTIGMATISM OF BOTH EYES: ICD-10-CM

## 2023-03-06 DIAGNOSIS — H52.4 PRESBYOPIA OF BOTH EYES: ICD-10-CM

## 2023-03-06 DIAGNOSIS — H02.883 MEIBOMIAN GLAND DYSFUNCTION (MGD) OF BOTH EYES: ICD-10-CM

## 2023-03-06 DIAGNOSIS — H25.813 MIXED TYPE AGE-RELATED CATARACT, BOTH EYES: ICD-10-CM

## 2023-03-06 PROCEDURE — 92083 EXTENDED VISUAL FIELD XM: CPT | Performed by: OPHTHALMOLOGY

## 2023-03-06 PROCEDURE — 92015 DETERMINE REFRACTIVE STATE: CPT | Performed by: OPHTHALMOLOGY

## 2023-03-06 PROCEDURE — 92133 CPTRZD OPH DX IMG PST SGM ON: CPT | Performed by: OPHTHALMOLOGY

## 2023-03-06 PROCEDURE — 92014 COMPRE OPH EXAM EST PT 1/>: CPT | Performed by: OPHTHALMOLOGY

## 2023-03-06 ASSESSMENT — CUP TO DISC RATIO
OS_RATIO: 0.55
OD_RATIO: 0.35

## 2023-03-06 ASSESSMENT — CONF VISUAL FIELD
OD_NORMAL: 1
OS_INFERIOR_TEMPORAL_RESTRICTION: 0
OS_SUPERIOR_TEMPORAL_RESTRICTION: 0
OS_SUPERIOR_NASAL_RESTRICTION: 0
OD_SUPERIOR_NASAL_RESTRICTION: 0
OD_INFERIOR_TEMPORAL_RESTRICTION: 0
OD_INFERIOR_NASAL_RESTRICTION: 0
METHOD: COUNTING FINGERS
OD_SUPERIOR_TEMPORAL_RESTRICTION: 0
OS_NORMAL: 1
OS_INFERIOR_NASAL_RESTRICTION: 0
COMMENTS: SOME PEAKING OU

## 2023-03-06 ASSESSMENT — PACHYMETRY
OS_CT(UM): 552
OD_CT(UM): 553

## 2023-03-06 ASSESSMENT — VISUAL ACUITY
OD_CC: J1
OD_CC+: +2
OD_CC: 20/25-3
METHOD: SNELLEN - LINEAR
OS_CC: 20/30
OS_CC: J2

## 2023-03-06 ASSESSMENT — REFRACTION_MANIFEST
OD_AXIS: 012
OD_ADD: +3.00
OS_AXIS: 015
OS_CYLINDER: +1.00
OD_SPHERE: -1.25
OS_SPHERE: -1.75
OS_ADD: +3.00
OD_CYLINDER: +0.75

## 2023-03-06 ASSESSMENT — TONOMETRY
OD_IOP_MMHG: 17
OS_IOP_MMHG: 18
IOP_METHOD: APPLANATION

## 2023-03-06 ASSESSMENT — REFRACTION_WEARINGRX
OD_AXIS: 006
SPECS_TYPE: BIFOCAL
OS_ADD: +2.75
OD_SPHERE: -1.25
OS_CYLINDER: +0.75
OD_ADD: +2.75
OS_SPHERE: -1.25
OS_AXIS: 025
OD_CYLINDER: +0.50

## 2023-03-06 ASSESSMENT — SLIT LAMP EXAM - LIDS
COMMENTS: TR MGD
COMMENTS: TR MGD

## 2023-03-06 ASSESSMENT — EXTERNAL EXAM - LEFT EYE: OS_EXAM: NORMAL

## 2023-03-06 ASSESSMENT — EXTERNAL EXAM - RIGHT EYE: OD_EXAM: NORMAL

## 2023-03-06 NOTE — NURSING NOTE
Chief Complaints and History of Present Illnesses   Patient presents with     COMPREHENSIVE EYE EXAM     Comprehensive exam /Glaucoma Suspect/ Cataracts     Chief Complaint(s) and History of Present Illness(es)     COMPREHENSIVE EYE EXAM            Laterality: both eyes    Associated symptoms: Negative for eye pain, tearing and discharge    Treatments tried: no treatments    Pain scale: 0/10    Comments: Comprehensive exam /Glaucoma Suspect/ Cataracts          Comments    Patient reports that since here last exam a year ago vision each eye.  Did not feel like updated distance glasses were helping much question if make correctly or if they were sitting were they should with optics being off due to needing to sitting different with being on top of or around hearing aids.   Had not been using anything for up close. Distance vision each eye seem to have been blurrier. Each eye feel a little more strained in general over the past year.   Does a lot of computer work.       THANH Sutherland COT 10:04 AM March 6, 2023

## 2023-03-06 NOTE — PROGRESS NOTES
HPI  Harriet Lance is a 77 year old female here for comprehensive eye exam and glaucoma suspect testing.    HPI     COMPREHENSIVE EYE EXAM    In both eyes.  Associated symptoms include Negative for eye pain, tearing and discharge.  Treatments tried include no treatments.  Pain was noted as 0/10. Additional comments: Comprehensive exam /Glaucoma Suspect/ Cataracts           Comments    Patient reports that since here last exam a year ago vision each eye.  Did not feel like updated distance glasses were helping much question if make correctly or if they were sitting were they should with optics being off due to needing to sitting different with being on top of or around hearing aids.   Had not been using anything for up close. Distance vision each eye seem to have been blurrier. Each eye feel a little more strained in general over the past year.   Does a lot of computer work.       Aaliyah Walters COT COT 10:04 AM March 6, 2023                   Last edited by Aaliyah Walters COT on 3/6/2023 10:05 AM.        PMH:  Hypertension, seasonal allergies  Past Medical History:   Diagnosis Date     Allergic rhinitis, cause unspecified      Chronic renal disease     normal creatinine since 2011     Colon cancer (H) 1998    chemotherapy     Diffuse cystic mastopathy     calcifications on the right breast - followed closely     Hypertension      Personal history of malignant neoplasm of large intestine 1998    had chemotherapy after colon resection -      Unspecified glaucoma(365.9) 2005    Early - followed by  Opthamologist - both eyes      POH:  Glasses for reading only, cataracts, glaucoma suspect, no surgery, no trauma  Oc Meds:  None (latan in the past at [facility] was taken off)  FH:  Denies any glaucoma, age related macular degeneration, or other known eye diseases       Assessment & Plan      (H40.003) Glaucoma suspect of both eyes  (primary encounter diagnosis)  Comment: based on cup to disc, was being followed at [facility]  for glc since at least 2005, was on Travatan in past but was taken off in past -- K pachy: 553/552   Tmax: 20/22    HVF: Fluct in 2015, 2016     CDR: 0.3/0.5    HRT/OCT: RNFL WNL      FHX of Glc:  No    Gonio: open      Intolerant to:      Asthma/COPD: No, on po BB  Steroid Use: No    Kidney Stones: No    Sulfa Allergy:No      IOP targets:L20s -- IOP good mid to low teens    Plan: OCT Optic Nerve RNFL Spectralis OU (both eyes), stable (all green)both eyes         OVF 24-2 Dynamic each eye- Non specific defects both eyes, stable  good intraocular pressure     (H25.813) Mixed type age-related cataract, both eyes - Both Eyes  Comment: mildly visually significant , limiting best corrected visual acuity some and does have glare at night but rarely drives at night  Plan: optimize Meibomian gland dysfunction/dry eye and glasses and if still having difficulties with driving, return to clinic for further discussion of cataract surgery.     (H52.203) Myopic astigmatism of both eyes    (H52.4) Presbyopia of both eyes - Both Eyes  Comment: mild changes   Plan: manifest refraction done and prescription for glasses given     (H02.883,  H02.886) Meibomian gland dysfunction (MGD) of both eyes - Both Eyes  Plan:   Warm compresses daily and wipe across eyelids   Artificial tears 4-6 times per day as needed for discomfort    -----------------------------------------------------------------------------------    Patient disposition:   Return in about 6 months (around 9/6/2023) for follow up- cataract, dry eye, iol master if va worse . Call for sooner appointment as needed.    Complete documentation of historical and exam elements from today's encounter can be found in the full encounter summary report (not reduplicated in this progress note). I personally obtained the chief complaint(s) and history of present illness.  I have confirmed and edited as necessary the CC, HPI, PMH/PSH, social history, FMH, ROS, and exam/neuro findings as  obtained by the technician or others. I have examined this patient myself and I personally viewed the image(s) and studies listed above and the documentation reflects my findings and interpretation.  I formulated and edited as necessary the assessment and plan and discussed the findings and management plan with the patient and family.     Balbina Aguilar MD

## 2023-05-23 DIAGNOSIS — I10 HYPERTENSION GOAL BP (BLOOD PRESSURE) < 140/90: ICD-10-CM

## 2023-05-23 DIAGNOSIS — E78.5 HYPERLIPIDEMIA WITH TARGET LDL LESS THAN 130: ICD-10-CM

## 2023-05-23 RX ORDER — LOSARTAN POTASSIUM 25 MG/1
TABLET ORAL
Qty: 30 TABLET | Refills: 0 | Status: SHIPPED | OUTPATIENT
Start: 2023-05-23 | End: 2023-05-24

## 2023-05-23 RX ORDER — METOPROLOL SUCCINATE 50 MG/1
TABLET, EXTENDED RELEASE ORAL
Qty: 60 TABLET | Refills: 0 | Status: SHIPPED | OUTPATIENT
Start: 2023-05-23 | End: 2023-05-24

## 2023-05-23 RX ORDER — PRAVASTATIN SODIUM 40 MG
TABLET ORAL
Qty: 30 TABLET | Refills: 0 | Status: SHIPPED | OUTPATIENT
Start: 2023-05-23 | End: 2023-05-24

## 2023-05-23 RX ORDER — HYDROCHLOROTHIAZIDE 25 MG/1
TABLET ORAL
Qty: 30 TABLET | Refills: 0 | Status: SHIPPED | OUTPATIENT
Start: 2023-05-23 | End: 2023-05-24

## 2023-05-23 NOTE — TELEPHONE ENCOUNTER
Prescription approved per [facility] Refill Protocol.  Has upcoming visit scheduled.  Madeline CORDOBA RN

## 2023-05-24 RX ORDER — HYDROCHLOROTHIAZIDE 25 MG/1
25 TABLET ORAL DAILY
Qty: 30 TABLET | Refills: 0 | COMMUNITY
Start: 2023-05-24 | End: 2023-05-31

## 2023-05-24 RX ORDER — METOPROLOL SUCCINATE 50 MG/1
TABLET, EXTENDED RELEASE ORAL
Qty: 1 TABLET | Refills: 0 | Status: SHIPPED | OUTPATIENT
Start: 2023-05-24 | End: 2023-05-24

## 2023-05-24 RX ORDER — METOPROLOL SUCCINATE 50 MG/1
TABLET, EXTENDED RELEASE ORAL
Qty: 30 TABLET | Refills: 0 | COMMUNITY
Start: 2023-05-24 | End: 2023-05-31

## 2023-05-24 RX ORDER — HYDROCHLOROTHIAZIDE 25 MG/1
25 TABLET ORAL DAILY
Qty: 1 TABLET | Refills: 0 | OUTPATIENT
Start: 2023-05-24

## 2023-05-24 RX ORDER — PRAVASTATIN SODIUM 40 MG
40 TABLET ORAL DAILY
Qty: 1 TABLET | Refills: 0 | OUTPATIENT
Start: 2023-05-24

## 2023-05-24 RX ORDER — METOPROLOL SUCCINATE 50 MG/1
TABLET, EXTENDED RELEASE ORAL
Qty: 1 TABLET | Refills: 0 | OUTPATIENT
Start: 2023-05-24

## 2023-05-24 RX ORDER — PRAVASTATIN SODIUM 40 MG
40 TABLET ORAL DAILY
Qty: 30 TABLET | Refills: 0 | COMMUNITY
Start: 2023-05-24 | End: 2023-05-31

## 2023-05-24 RX ORDER — LOSARTAN POTASSIUM 25 MG/1
TABLET ORAL
Qty: 1 TABLET | Refills: 0 | Status: SHIPPED | OUTPATIENT
Start: 2023-05-24 | End: 2023-05-24

## 2023-05-24 RX ORDER — HYDROCHLOROTHIAZIDE 25 MG/1
TABLET ORAL
Qty: 1 TABLET | Refills: 0 | Status: SHIPPED | OUTPATIENT
Start: 2023-05-24 | End: 2023-05-24

## 2023-05-24 RX ORDER — PRAVASTATIN SODIUM 40 MG
TABLET ORAL
Qty: 1 TABLET | Refills: 0 | Status: SHIPPED | OUTPATIENT
Start: 2023-05-24 | End: 2023-05-24

## 2023-05-24 RX ORDER — LOSARTAN POTASSIUM 25 MG/1
25 TABLET ORAL DAILY
Qty: 30 TABLET | Refills: 0 | COMMUNITY
Start: 2023-05-24 | End: 2023-05-31

## 2023-05-24 RX ORDER — LOSARTAN POTASSIUM 25 MG/1
25 TABLET ORAL DAILY
Qty: 1 TABLET | Refills: 0 | OUTPATIENT
Start: 2023-05-24

## 2023-05-28 ENCOUNTER — MYC MEDICAL ADVICE (OUTPATIENT)
Dept: FAMILY MEDICINE | Facility: CLINIC | Age: 78
End: 2023-05-28
Payer: COMMERCIAL

## 2023-05-28 DIAGNOSIS — E78.5 HYPERLIPIDEMIA WITH TARGET LDL LESS THAN 130: ICD-10-CM

## 2023-05-28 DIAGNOSIS — I10 HYPERTENSION GOAL BP (BLOOD PRESSURE) < 140/90: ICD-10-CM

## 2023-05-30 ENCOUNTER — TELEPHONE (OUTPATIENT)
Dept: FAMILY MEDICINE | Facility: CLINIC | Age: 78
End: 2023-05-30
Payer: COMMERCIAL

## 2023-05-30 NOTE — TELEPHONE ENCOUNTER
Prior Authorization Retail Medication Request    Medication/Dose: losartan (COZAAR) 25 MG tablet  ICD code (if different than what is on RX):  Hypertension goal BP (blood pressure) < 140/90 [I10]   Previously Tried and Failed:  unknown  Rationale:  unknown    Insurance Name:  [employer]are medicare  Insurance ID:  [id number]

## 2023-05-30 NOTE — TELEPHONE ENCOUNTER
Prior Authorization Retail Medication Request    Medication/Dose: metoprolol succinate ER (TOPROL XL) 50 MG 24 hr tablet  ICD code (if different than what is on RX):  Hypertension goal BP (blood pressure) < 140/90 [I10]   Previously Tried and Failed:  unknown  Rationale:  unknown    Insurance Name:  ucare medicare  Insurance ID:  [id number]

## 2023-05-31 DIAGNOSIS — I10 HYPERTENSION GOAL BP (BLOOD PRESSURE) < 140/90: ICD-10-CM

## 2023-05-31 DIAGNOSIS — E78.5 HYPERLIPIDEMIA WITH TARGET LDL LESS THAN 130: ICD-10-CM

## 2023-05-31 RX ORDER — PRAVASTATIN SODIUM 40 MG
40 TABLET ORAL DAILY
Qty: 30 TABLET | Refills: 0 | Status: SHIPPED | OUTPATIENT
Start: 2023-05-31 | End: 2023-06-16

## 2023-05-31 RX ORDER — LOSARTAN POTASSIUM 25 MG/1
25 TABLET ORAL DAILY
Qty: 30 TABLET | Refills: 0 | Status: SHIPPED | OUTPATIENT
Start: 2023-05-31 | End: 2023-06-16

## 2023-05-31 RX ORDER — HYDROCHLOROTHIAZIDE 25 MG/1
TABLET ORAL
Qty: 1 TABLET | Refills: 0 | OUTPATIENT
Start: 2023-05-31

## 2023-05-31 RX ORDER — LOSARTAN POTASSIUM 25 MG/1
TABLET ORAL
Qty: 1 TABLET | Refills: 0 | OUTPATIENT
Start: 2023-05-31

## 2023-05-31 RX ORDER — HYDROCHLOROTHIAZIDE 25 MG/1
25 TABLET ORAL DAILY
Qty: 30 TABLET | Refills: 0 | Status: SHIPPED | OUTPATIENT
Start: 2023-05-31 | End: 2023-06-16

## 2023-05-31 RX ORDER — METOPROLOL SUCCINATE 50 MG/1
TABLET, EXTENDED RELEASE ORAL
Qty: 30 TABLET | Refills: 0 | Status: SHIPPED | OUTPATIENT
Start: 2023-05-31 | End: 2023-06-16

## 2023-05-31 RX ORDER — PRAVASTATIN SODIUM 40 MG
TABLET ORAL
Qty: 1 TABLET | Refills: 0 | OUTPATIENT
Start: 2023-05-31

## 2023-06-05 NOTE — TELEPHONE ENCOUNTER
Central Prior Authorization Team   Phone: [phone]      PA Initiation    Medication:  Losartan 25mg  Insurance Company: [employer] - Phone [phone] Fax [phone]  Pharmacy Filling the Rx: [id number] [address] AT [address] [place] [address]  Filling Pharmacy Phone: [phone]  Filling Pharmacy Fax:    Start Date: 6/5/2023    Called pharmacy to see if a PA is needed since this medication is normally covered by insurance, per the tech, the medication does not need a PA, it processed through insurance and the patient has already picked up.

## 2023-06-05 NOTE — TELEPHONE ENCOUNTER
Central Prior Authorization Team   Phone: [phone]      PA Initiation    Medication: METOPROLOL SUCCINATE ER 50 MG PO TB24  Insurance Company: [employer] - Phone [phone] Fax [phone]  Pharmacy Filling the Rx: [id number] [address] AT [address] [place] [address]  Filling Pharmacy Phone: [phone]  Filling Pharmacy Fax:    Start Date: 6/5/2023    Called pharmacy to see if a PA is needed since this medication is normally covered by insurance, per the tech, the medication does not need a PA, it processed through insurance and the patient has already picked up.

## 2023-06-09 ASSESSMENT — ENCOUNTER SYMPTOMS
ARTHRALGIAS: 0
SORE THROAT: 0
BREAST MASS: 0
HEMATOCHEZIA: 0
JOINT SWELLING: 0
EYE PAIN: 0
WEAKNESS: 0
ABDOMINAL PAIN: 0
CONSTIPATION: 1
HEARTBURN: 1
FREQUENCY: 0
DIARRHEA: 0
MYALGIAS: 0
FEVER: 0
HEADACHES: 1
SHORTNESS OF BREATH: 0
NAUSEA: 0
NERVOUS/ANXIOUS: 0
PALPITATIONS: 0
PARESTHESIAS: 0
COUGH: 0
DIZZINESS: 0
CHILLS: 0
HEMATURIA: 0
DYSURIA: 0

## 2023-06-09 ASSESSMENT — ACTIVITIES OF DAILY LIVING (ADL): CURRENT_FUNCTION: TELEPHONE REQUIRES ASSISTANCE

## 2023-06-10 ENCOUNTER — HEALTH MAINTENANCE LETTER (OUTPATIENT)
Age: 78
End: 2023-06-10

## 2023-06-16 ENCOUNTER — OFFICE VISIT (OUTPATIENT)
Dept: FAMILY MEDICINE | Facility: CLINIC | Age: 78
End: 2023-06-16
Payer: COMMERCIAL

## 2023-06-16 VITALS
DIASTOLIC BLOOD PRESSURE: 58 MMHG | WEIGHT: 183.9 LBS | TEMPERATURE: 97.8 F | SYSTOLIC BLOOD PRESSURE: 111 MMHG | HEART RATE: 50 BPM | OXYGEN SATURATION: 97 % | HEIGHT: 65 IN | RESPIRATION RATE: 16 BRPM | BODY MASS INDEX: 30.64 KG/M2

## 2023-06-16 DIAGNOSIS — E78.5 HYPERLIPIDEMIA WITH TARGET LDL LESS THAN 130: ICD-10-CM

## 2023-06-16 DIAGNOSIS — I10 HYPERTENSION GOAL BP (BLOOD PRESSURE) < 140/90: ICD-10-CM

## 2023-06-16 DIAGNOSIS — Z00.00 ENCOUNTER FOR MEDICARE ANNUAL WELLNESS EXAM: Primary | ICD-10-CM

## 2023-06-16 DIAGNOSIS — N18.30 STAGE 3 CHRONIC KIDNEY DISEASE, UNSPECIFIED WHETHER STAGE 3A OR 3B CKD (H): ICD-10-CM

## 2023-06-16 DIAGNOSIS — R07.89 CHEST PRESSURE: ICD-10-CM

## 2023-06-16 DIAGNOSIS — R19.7 DIARRHEA, UNSPECIFIED TYPE: ICD-10-CM

## 2023-06-16 LAB
ALBUMIN SERPL BCG-MCNC: 4.4 G/DL (ref 3.5–5.2)
ALP SERPL-CCNC: 81 U/L (ref 35–104)
ALT SERPL W P-5'-P-CCNC: 25 U/L (ref 0–50)
ANION GAP SERPL CALCULATED.3IONS-SCNC: 14 MMOL/L (ref 7–15)
AST SERPL W P-5'-P-CCNC: 31 U/L (ref 0–45)
BILIRUB SERPL-MCNC: 0.3 MG/DL
BUN SERPL-MCNC: 14.5 MG/DL (ref 8–23)
CALCIUM SERPL-MCNC: 9.7 MG/DL (ref 8.8–10.2)
CHLORIDE SERPL-SCNC: 103 MMOL/L (ref 98–107)
CHOLEST SERPL-MCNC: 138 MG/DL
CREAT SERPL-MCNC: 0.95 MG/DL (ref 0.51–0.95)
CREAT UR-MCNC: 71.8 MG/DL
DEPRECATED HCO3 PLAS-SCNC: 25 MMOL/L (ref 22–29)
ERYTHROCYTE [DISTWIDTH] IN BLOOD BY AUTOMATED COUNT: 13.8 % (ref 10–15)
GFR SERPL CREATININE-BSD FRML MDRD: 61 ML/MIN/1.73M2
GLUCOSE SERPL-MCNC: 106 MG/DL (ref 70–99)
HCT VFR BLD AUTO: 40.5 % (ref 35–47)
HDLC SERPL-MCNC: 43 MG/DL
HGB BLD-MCNC: 13.5 G/DL (ref 11.7–15.7)
LDLC SERPL CALC-MCNC: 72 MG/DL
MCH RBC QN AUTO: 25.9 PG (ref 26.5–33)
MCHC RBC AUTO-ENTMCNC: 33.3 G/DL (ref 31.5–36.5)
MCV RBC AUTO: 78 FL (ref 78–100)
MICROALBUMIN UR-MCNC: 26.6 MG/L
MICROALBUMIN/CREAT UR: 37.05 MG/G CR (ref 0–25)
NONHDLC SERPL-MCNC: 95 MG/DL
PLATELET # BLD AUTO: 275 10E3/UL (ref 150–450)
POTASSIUM SERPL-SCNC: 3.7 MMOL/L (ref 3.4–5.3)
PROT SERPL-MCNC: 8.3 G/DL (ref 6.4–8.3)
RBC # BLD AUTO: 5.21 10E6/UL (ref 3.8–5.2)
SODIUM SERPL-SCNC: 142 MMOL/L (ref 136–145)
TRIGL SERPL-MCNC: 114 MG/DL
TSH SERPL DL<=0.005 MIU/L-ACNC: 2.94 UIU/ML (ref 0.3–4.2)
WBC # BLD AUTO: 7.1 10E3/UL (ref 4–11)

## 2023-06-16 PROCEDURE — 82043 UR ALBUMIN QUANTITATIVE: CPT | Performed by: FAMILY MEDICINE

## 2023-06-16 PROCEDURE — 93000 ELECTROCARDIOGRAM COMPLETE: CPT | Performed by: FAMILY MEDICINE

## 2023-06-16 PROCEDURE — 86364 TISS TRNSGLTMNASE EA IG CLAS: CPT | Performed by: FAMILY MEDICINE

## 2023-06-16 PROCEDURE — 99214 OFFICE O/P EST MOD 30 MIN: CPT | Mod: 25 | Performed by: FAMILY MEDICINE

## 2023-06-16 PROCEDURE — 85027 COMPLETE CBC AUTOMATED: CPT | Performed by: FAMILY MEDICINE

## 2023-06-16 PROCEDURE — 36415 COLL VENOUS BLD VENIPUNCTURE: CPT | Performed by: FAMILY MEDICINE

## 2023-06-16 PROCEDURE — G0439 PPPS, SUBSEQ VISIT: HCPCS | Performed by: FAMILY MEDICINE

## 2023-06-16 PROCEDURE — 84443 ASSAY THYROID STIM HORMONE: CPT | Performed by: FAMILY MEDICINE

## 2023-06-16 PROCEDURE — 82570 ASSAY OF URINE CREATININE: CPT | Performed by: FAMILY MEDICINE

## 2023-06-16 PROCEDURE — 80061 LIPID PANEL: CPT | Performed by: FAMILY MEDICINE

## 2023-06-16 PROCEDURE — 80053 COMPREHEN METABOLIC PANEL: CPT | Performed by: FAMILY MEDICINE

## 2023-06-16 RX ORDER — HYDROCHLOROTHIAZIDE 25 MG/1
25 TABLET ORAL DAILY
Qty: 90 TABLET | Refills: 3 | Status: SHIPPED | OUTPATIENT
Start: 2023-06-16 | End: 2024-06-25

## 2023-06-16 RX ORDER — METOPROLOL SUCCINATE 50 MG/1
TABLET, EXTENDED RELEASE ORAL
Qty: 90 TABLET | Refills: 3 | Status: SHIPPED | OUTPATIENT
Start: 2023-06-16 | End: 2023-12-15

## 2023-06-16 RX ORDER — PRAVASTATIN SODIUM 40 MG
40 TABLET ORAL DAILY
Qty: 90 TABLET | Refills: 3 | Status: SHIPPED | OUTPATIENT
Start: 2023-06-16 | End: 2024-06-25

## 2023-06-16 RX ORDER — AMLODIPINE BESYLATE 10 MG/1
10 TABLET ORAL DAILY
Qty: 90 TABLET | Refills: 3 | Status: SHIPPED | OUTPATIENT
Start: 2023-06-16 | End: 2024-04-15

## 2023-06-16 RX ORDER — LORATADINE 10 MG/1
10 TABLET ORAL DAILY
COMMUNITY

## 2023-06-16 RX ORDER — LOSARTAN POTASSIUM 25 MG/1
25 TABLET ORAL DAILY
Qty: 90 TABLET | Refills: 3 | Status: SHIPPED | OUTPATIENT
Start: 2023-06-16 | End: 2024-05-17

## 2023-06-16 ASSESSMENT — ENCOUNTER SYMPTOMS
SORE THROAT: 0
PARESTHESIAS: 0
WEAKNESS: 0
JOINT SWELLING: 0
NERVOUS/ANXIOUS: 0
ABDOMINAL PAIN: 0
HEMATOCHEZIA: 0
BREAST MASS: 0
CHILLS: 0
HEMATURIA: 0
HEADACHES: 1
PALPITATIONS: 0
MYALGIAS: 0
HEARTBURN: 1
SHORTNESS OF BREATH: 0
DIARRHEA: 0
CONSTIPATION: 1
DIZZINESS: 0
EYE PAIN: 0
DYSURIA: 0
NAUSEA: 0
FREQUENCY: 0
ARTHRALGIAS: 0
FEVER: 0
COUGH: 0

## 2023-06-16 ASSESSMENT — ACTIVITIES OF DAILY LIVING (ADL): CURRENT_FUNCTION: TELEPHONE REQUIRES ASSISTANCE

## 2023-06-16 ASSESSMENT — PAIN SCALES - GENERAL: PAINLEVEL: NO PAIN (0)

## 2023-06-16 NOTE — PROGRESS NOTES
"SUBJECTIVE:   Harriet is a 78 year old who presents for Preventive Visit.      6/16/2023     7:16 AM   Additional Questions   Roomed by Raya PERRY     Are you in the first 12 months of your Medicare coverage?  No    Healthy Habits:     In general, how would you rate your overall health?  Good    Frequency of exercise:  4-5 days/week    Duration of exercise:  15-30 minutes    Do you usually eat at least 4 servings of fruit and vegetables a day, include whole grains    & fiber and avoid regularly eating high fat or \"junk\" foods?  Yes    Taking medications regularly:  Yes    Medication side effects:  None    Ability to successfully perform activities of daily living:  Telephone requires assistance    Home Safety:  No safety concerns identified    Hearing Impairment:  Difficulty understanding speech on the telephone    In the past 6 months, have you been bothered by leaking of urine?  No    In general, how would you rate your overall mental or emotional health?  Excellent      PHQ-2 Total Score: 0    Additional concerns today:  No        Have you ever done Advance Care Planning? (For example, a Health Directive, POLST, or a discussion with a medical provider or your loved ones about your wishes):     Fall risk  Fallen 2 or more times in the past year?: No  Any fall with injury in the past year?: No    Cognitive Screening   1) Repeat 3 items (Leader, Season, Table)    2) Clock draw: NORMAL  3) 3 item recall: Recalls 3 objects  Results: 3 items recalled: COGNITIVE IMPAIRMENT LESS LIKELY    Mini-CogTM Copyright S Edis. Licensed by the author for use in [facility]; reprinted with permission (stefan@.[email or web address]). All rights reserved.      Do you have sleep apnea, excessive snoring or daytime drowsiness?: no    Reviewed and updated as needed this visit by clinical staff   Tobacco  Allergies  Meds  Problems  Med Hx  Surg Hx  Fam Hx          Reviewed and updated as needed this visit by Provider   Tobacco  " Allergies  Meds  Problems  Med Hx  Surg Hx  Fam Hx         Social History     Tobacco Use     Smoking status: Never     Smokeless tobacco: Never   Vaping Use     Vaping status: Never Used   Substance Use Topics     Alcohol use: Never     Comment: rarely              6/9/2023    10:04 AM   Alcohol Use   Prescreen: >3 drinks/day or >7 drinks/week? No          View : No data to display.              Do you have a current opioid prescription? No  Do you use any other controlled substances or medications that are not prescribed by a provider? None          PROBLEMS TO ADD ON...  -------------------------------------  Diarrhea -   Started last year   Has had worsning GI symptoms all year -   Lactose makes it worse now   Alternates between diarrhea an constipation  Had colonoscopy in October and really struggled afterwards    Atypical chest pressure - started 2 days ago when terrible air quality alert - >200's  Felt like sand in throat and down to chest  Worse with exertion     Current providers sharing in care for this patient include:   Patient Care Team:  Jolanta Wilkerson DO as PCP - General  Amaury Ledesma MD as MD (Family Medicine - Sports Medicine)  Jolanta Wilkerson DO as Assigned PCP  Balbina Aguilar MD as MD (Ophthalmology)  Fredis Galvan MD as MD (Dermatology)  Balbina Aguilar MD as Assigned Surgical Provider    The following health maintenance items are reviewed in Epic and correct as of today:  Health Maintenance   Topic Date Due     HEMOGLOBIN  01/21/2021     COVID-19 Vaccine (6 - Moderna series) 04/05/2023     MEDICARE ANNUAL WELLNESS VISIT  05/04/2023     BMP  05/04/2023     LIPID  05/04/2023     MICROALBUMIN  05/04/2023     MAMMO SCREENING  01/23/2024     ANNUAL REVIEW OF HM ORDERS  06/16/2024     FALL RISK ASSESSMENT  06/16/2024     COLORECTAL CANCER SCREENING  10/10/2025     ADVANCE CARE PLANNING  05/04/2027     DTAP/TDAP/TD IMMUNIZATION (3 - Td or Tdap)  12/15/2027     DEXA  2031     HEPATITIS C SCREENING  Completed     PHQ-2 (once per calendar year)  Completed     INFLUENZA VACCINE  Completed     Pneumococcal Vaccine: 65+ Years  Completed     URINALYSIS  Completed     ZOSTER IMMUNIZATION  Completed     IPV IMMUNIZATION  Aged Out     MENINGITIS IMMUNIZATION  Aged Out     Patient Active Problem List   Diagnosis     Allergic rhinitis     Diffuse cystic mastopathy     History of malignant neoplasm of large intestine     Enlarged lymph nodes     CARDIOVASCULAR SCREENING; LDL GOAL LESS THAN 100     Borderline glaucoma (glaucoma suspect)     Hypertension goal BP (blood pressure) < 140/90     Advanced directives, counseling/discussion     Arthritis of hip     HL (hearing loss)     Hyperlipidemia with target LDL less than 130     BMI 37.0-37.9, adult     Benign neoplasm of colon     Stage 3 chronic kidney disease, unspecified whether stage 3a or 3b CKD (H)     Past Surgical History:   Procedure Laterality Date     AS TOTAL HIP ARTHROPLASTY Right 2017    done at [facility]     BIOPSY  1975    Age 30     COLONOSCOPY N/A 10/10/2022    Procedure: COLONOSCOPY, WITH POLYPECTOMY;  Surgeon: Dianna La MD;  Location: [facility] OR     ORTHOPEDIC SURGERY       ZZC REMOVAL COLON/ILEOSTOMY      8 inches of colon removed - not clear what section       Social History     Tobacco Use     Smoking status: Never     Smokeless tobacco: Never   Vaping Use     Vaping status: Never Used   Substance Use Topics     Alcohol use: Never     Comment: rarely     Family History   Problem Relation Age of Onset     Heart Disease Mother          of heart Attack at age 75     Hypertension Mother      Cancer Father         Prostate     Diabetes Father      Prostate Cancer Father      Allergies Father      Cancer - colorectal Maternal Grandmother         Colon Resection     Breast Cancer Maternal Grandmother      Cancer - colorectal Maternal Uncle      Glaucoma No family hx of   "    Macular Degeneration No family hx of      Melanoma No family hx of      Skin Cancer No family hx of                Pertinent mammograms are reviewed under the imaging tab.    Review of Systems   Constitutional: Negative for chills and fever.   HENT: Positive for hearing loss. Negative for congestion, ear pain and sore throat.    Eyes: Positive for visual disturbance. Negative for pain.   Respiratory: Negative for cough and shortness of breath.    Cardiovascular: Negative for chest pain, palpitations and peripheral edema.   Gastrointestinal: Positive for constipation and heartburn. Negative for abdominal pain, diarrhea, hematochezia and nausea.   Breasts:  Negative for tenderness, breast mass and discharge.   Genitourinary: Negative for dysuria, frequency, genital sores, hematuria, pelvic pain, urgency, vaginal bleeding and vaginal discharge.   Musculoskeletal: Negative for arthralgias, joint swelling and myalgias.   Skin: Negative for rash.   Neurological: Positive for headaches. Negative for dizziness, weakness and paresthesias.   Psychiatric/Behavioral: Negative for mood changes. The patient is not nervous/anxious.          OBJECTIVE:   /58 (BP Location: Left arm, Patient Position: Sitting, Cuff Size: Adult Regular)   Pulse 50   Temp 97.8  F (36.6  C) (Temporal)   Resp 16   Ht 1.651 m (5' 5\")   Wt 83.4 kg (183 lb 14.4 oz)   SpO2 97%   BMI 30.60 kg/m   Estimated body mass index is 30.6 kg/m  as calculated from the following:    Height as of this encounter: 1.651 m (5' 5\").    Weight as of this encounter: 83.4 kg (183 lb 14.4 oz).  Physical Exam  GENERAL APPEARANCE: healthy, alert and no distress  EYES: Eyes grossly normal to inspection, PERRL and conjunctivae and sclerae normal  HENT: ear canals and TM's normal, nose and mouth without ulcers or lesions, oropharynx clear and oral mucous membranes moist  NECK: no adenopathy, no asymmetry, masses, or scars and thyroid normal to palpation  RESP: lungs " clear to auscultation - no rales, rhonchi or wheezes  BREAST: normal without masses, tenderness or nipple discharge and no palpable axillary masses or adenopathy  CV: regular rate and rhythm, normal S1 S2, no S3 or S4, no murmur, click or rub, no peripheral edema and peripheral pulses strong  ABDOMEN: soft, nontender, no hepatosplenomegaly, no masses and bowel sounds normal  MS: no musculoskeletal defects are noted and gait is age appropriate without ataxia  SKIN: no suspicious lesions or rashes  NEURO: Normal strength and tone, sensory exam grossly normal, mentation intact and speech normal  PSYCH: mentation appears normal and affect normal/bright    Diagnostic Test Results:  Labs reviewed in Epic    EKG - sinus bradycardia but NO st seg changes or t wave inversions     ASSESSMENT / PLAN:   (Z00.00) Encounter for Medicare annual wellness exam  (primary encounter diagnosis)  Comment:    Plan:       (N18.30) Stage 3 chronic kidney disease, unspecified whether stage 3a or 3b CKD (H)  Comment: stable   Plan: Albumin Random Urine Quantitative with Creat         Ratio, CBC with platelets, Comprehensive         metabolic panel (BMP + Alb, Alk Phos, ALT, AST,        Total. Bili, TP), TSH with free T4 reflex             (E78.5) Hyperlipidemia with target LDL less than 130  Comment:    Plan: Lipid panel reflex to direct LDL Non-fasting,         TSH with free T4 reflex, pravastatin         (PRAVACHOL) 40 MG tablet             (R19.7) Diarrhea, unspecified type  Comment:  Will check tTG to look for celiac   Plan: Tissue transglutaminase yuan IgA and IgG, TSH         with free T4 reflex             (I10) Hypertension goal BP (blood pressure) < 140/90  Comment: BP controlled -   Plan: Comprehensive metabolic panel (BMP + Alb, Alk         Phos, ALT, AST, Total. Bili, TP), TSH with free        T4 reflex, amLODIPine (NORVASC) 10 MG tablet,         hydrochlorothiazide (HYDRODIURIL) 25 MG tablet,        losartan (COZAAR) 25 MG tablet,  "metoprolol         succinate ER (TOPROL XL) 50 MG 24 hr tablet             (R07.89) Chest pressure  Comment: atypical chest symptoms  EKG today unchanged from one year prior  Suspect due to poor air quality  Pt will call or RTC if symptoms worsen or do not improve.   Plan: EKG 12-lead complete w/read - Clinics               Patient has been advised of split billing requirements and indicates understanding: Yes      COUNSELING:  Reviewed preventive health counseling, as reflected in patient instructions      BMI:   Estimated body mass index is 30.6 kg/m  as calculated from the following:    Height as of this encounter: 1.651 m (5' 5\").    Weight as of this encounter: 83.4 kg (183 lb 14.4 oz).   Weight management plan: Discussed healthy diet and exercise guidelines      She reports that she has never smoked. She has never used smokeless tobacco.      Appropriate preventive services were discussed with this patient, including applicable screening as appropriate for cardiovascular disease, diabetes, osteopenia/osteoporosis, and glaucoma.  As appropriate for age/gender, discussed screening for colorectal cancer, prostate cancer, breast cancer, and cervical cancer. Checklist reviewing preventive services available has been given to the patient.    Reviewed patients plan of care and provided an AVS. The Basic Care Plan (routine screening as documented in Health Maintenance) for [place] meets the Care Plan requirement. This Care Plan has been established and reviewed with the Patient.        Jolanta Wilkerson [facility]    Identified Health Risks:    I have reviewed Opioid Use Disorder and Substance Use Disorder risk factors and made any needed referrals.     "

## 2023-06-16 NOTE — PATIENT INSTRUCTIONS
Patient Education   Personalized Prevention Plan  You are due for the preventive services outlined below.  Your care team is available to assist you in scheduling these services.  If you have already completed any of these items, please share that information with your care team to update in your medical record.  Health Maintenance Due   Topic Date Due     Hemoglobin  01/21/2021     COVID-19 Vaccine (6 - Moderna series) 04/05/2023     Annual Wellness Visit  05/04/2023     Basic Metabolic Panel  05/04/2023     Cholesterol Lab  05/04/2023     Kidney Microalbumin Urine Test  05/04/2023     ANNUAL REVIEW OF HM ORDERS  05/04/2023

## 2023-06-19 LAB
TTG IGA SER-ACNC: 0.9 U/ML
TTG IGG SER-ACNC: 0.6 U/ML

## 2023-06-21 NOTE — RESULT ENCOUNTER NOTE
Dear Harriet,   Your test results are all back -   -Normal red blood cell (hgb) levels, normal white blood cell count and normal platelet levels.  -Cholesterol levels (LDL,HDL, Triglycerides) are normal.  ADVISE: rechecking in 1 year.   -Liver and gallbladder tests (ALT,AST, Alk phos,bilirubin) are normal.  -Kidney function (GFR) is normal.  -Sodium is normal.  -Potassium is normal.  -Calcium is normal.  -Glucose is mildly elevated but you were nonfasting and this is okay..  -TSH (thyroid stimulating hormone) level is normal which indicates normal thyroid function.  -Microalbumin (urine protein) level is just borderline elevated.  Plan to recheck in 6 months.  Your TTG - celiac test - is negative/normal.  Let us know if you have any questions.  -Jolanta Wilkerson, DO

## 2023-07-31 ENCOUNTER — MYC MEDICAL ADVICE (OUTPATIENT)
Dept: FAMILY MEDICINE | Facility: CLINIC | Age: 78
End: 2023-07-31
Payer: COMMERCIAL

## 2023-07-31 DIAGNOSIS — H90.8 MIXED CONDUCTIVE AND SENSORINEURAL HEARING LOSS, UNSPECIFIED LATERALITY: Primary | ICD-10-CM

## 2023-08-02 NOTE — TELEPHONE ENCOUNTER
PN,  Please see below MyChart message and advise.  Pended referral if approved.  Thanks,  Madeline MACHADO RN

## 2023-08-08 NOTE — TELEPHONE ENCOUNTER
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], ref'd by Jolanta Wilkerson, rec's in [facility], pt made appt, [facility] location     RECORDS REQUESTED FROM:       Clinic name Comments Records Status Imaging Status   [facility] 7/31/23 mychart advice/ referral [facility] *per patient, will hand carry audiogram   pending    [facility] MR brain 6/29/20 Epic Pacs            August 8, 2023 at 11:39 AM - called and Lm x1 with CB number if patient have copy of her hearing test done with [facility] and ENT records -Cece  August 9, 2023 at 10:02 AM - Patient called back. No outside ENT records. New issue. Patient stated that she will be bringing a copy of the audiogram to the appointment as she had forgotten to get one while there but will tried to get one fax over or mail. Patient have my direct number if any questions -Cece

## 2023-09-21 ENCOUNTER — TELEPHONE (OUTPATIENT)
Dept: FAMILY MEDICINE | Facility: CLINIC | Age: 78
End: 2023-09-21
Payer: COMMERCIAL

## 2023-09-21 DIAGNOSIS — Z96.642 STATUS POST TOTAL REPLACEMENT OF LEFT HIP: Primary | ICD-10-CM

## 2023-09-21 NOTE — TELEPHONE ENCOUNTER
FD,  Patient called.  States she came in and dropped off a xray order of her L hip.  States she dropped it off at the .  No notes indicated about the order.  We will be on the lookout for the order.  Did any of you see/talk to this patient about orders?  Thanks!  Liliana MCCONNELL

## 2023-09-21 NOTE — TELEPHONE ENCOUNTER
Xray order was dropped of by patient.  An xray cannot be scheduled until the order is added in the chart.  I am forwarding a copy of the order to Dr Wilkerson to request it be added.     Please route to TC or give to   so patient can be contacted about scheduling.    Patient prefers October 2nd or October 9th the in the morning .  Patient may be reached by either phone [phone],  or by [employer].   Patient did not have a preference.     Kassi DAMON

## 2023-09-22 NOTE — TELEPHONE ENCOUNTER
Order placed   Please help her schedule appt when xray back and put paperwork on Xray dest  Thanks  PN

## 2023-09-25 ENCOUNTER — ANCILLARY PROCEDURE (OUTPATIENT)
Dept: GENERAL RADIOLOGY | Facility: CLINIC | Age: 78
End: 2023-09-25
Attending: FAMILY MEDICINE
Payer: COMMERCIAL

## 2023-09-25 DIAGNOSIS — Z96.642 STATUS POST TOTAL REPLACEMENT OF LEFT HIP: ICD-10-CM

## 2023-09-25 PROCEDURE — 73501 X-RAY EXAM HIP UNI 1 VIEW: CPT | Mod: TC | Performed by: RADIOLOGY

## 2023-10-03 ENCOUNTER — TELEPHONE (OUTPATIENT)
Dept: AUDIOLOGY | Facility: CLINIC | Age: 78
End: 2023-10-03
Payer: COMMERCIAL

## 2023-10-04 ENCOUNTER — PRE VISIT (OUTPATIENT)
Dept: OTOLARYNGOLOGY | Facility: CLINIC | Age: 78
End: 2023-10-04

## 2023-10-04 ENCOUNTER — OFFICE VISIT (OUTPATIENT)
Dept: OTOLARYNGOLOGY | Facility: CLINIC | Age: 78
End: 2023-10-04
Payer: COMMERCIAL

## 2023-10-04 ENCOUNTER — OFFICE VISIT (OUTPATIENT)
Dept: AUDIOLOGY | Facility: CLINIC | Age: 78
End: 2023-10-04
Payer: COMMERCIAL

## 2023-10-04 DIAGNOSIS — H90.3 SENSORINEURAL HEARING LOSS (SNHL) OF BOTH EARS: ICD-10-CM

## 2023-10-04 DIAGNOSIS — H90.3 SENSORY HEARING LOSS, BILATERAL: Primary | ICD-10-CM

## 2023-10-04 PROCEDURE — 99204 OFFICE O/P NEW MOD 45 MIN: CPT | Mod: 25 | Performed by: OTOLARYNGOLOGY

## 2023-10-04 PROCEDURE — 92550 TYMPANOMETRY & REFLEX THRESH: CPT | Performed by: AUDIOLOGIST

## 2023-10-04 PROCEDURE — 92504 EAR MICROSCOPY EXAMINATION: CPT | Performed by: OTOLARYNGOLOGY

## 2023-10-04 PROCEDURE — 92557 COMPREHENSIVE HEARING TEST: CPT | Performed by: AUDIOLOGIST

## 2023-10-04 PROCEDURE — 92565 STENGER TEST PURE TONE: CPT | Performed by: AUDIOLOGIST

## 2023-10-04 ASSESSMENT — PAIN SCALES - GENERAL: PAINLEVEL: NO PAIN (0)

## 2023-10-04 NOTE — PATIENT INSTRUCTIONS
1. You were seen in the ENT Clinic today by Dr. Duran.  If you have any questions or concerns after your appointment, please call   - Option 1: ENT Clinic: [phone]   - Option 2: Renee (Dr. Duran's Nurse): [phone]          Sonya (Dr. Duran's Nurse): [phone]     2.   Plan to return to clinic after CI evaluation with CT before visit    3. CI evaluation in audiology    How to Contact Us:  Send a [employer]pl[employer] message to your provider. Our team will respond to you via [employer]. Occasionally, we will need to call you to get further information.  For urgent matters (Monday-Friday), call the ENT Clinic: [phone] and speak with a call center team member - they will route your call appropriately.   If you'd like to speak directly with a nurse, please find our contact information below. We do our best to check voicemail frequently throughout the day, and will work to call you back within 1-2 days. For urgent matters, please use the general clinic phone numbers listed above.        Renee DRAPER LPN  [facility] - Otolaryngology

## 2023-10-04 NOTE — NURSING NOTE
Chief Complaint   Patient presents with    Consult     Mixed hearing loss      Lincoln Miranda LPN

## 2023-10-04 NOTE — PROGRESS NOTES
AUDIOLOGY REPORT    SUMMARY: Audiology visit completed. See audiogram for results.      RECOMMENDATIONS: Follow-up with ENT.    Joycelyn Piper.  Licensed Audiologist  [place] # [id number]

## 2023-10-04 NOTE — LETTER
10/4/2023       RE: Harriet Lance  [address] [id number]     Dear Colleague,    Thank you for referring your patient, Harriet Lance, to the [facility]. Please see a copy of my visit note below.    Harriet Lance is seen in consultation from Dr. Wilkerson.  She is a 78 year old female being seen for bilateral hearing loss. She reports she has long standing hearing loss in both ears with the right ear being the worse hearing ear. She has worn bilateral hearing aids but is finding them to be not as helpful as they had been in the past. Her hearing loss is progressive and she has not had any sudden losses. No pain or drainage from either ear. No vertigo, some chronic imbalance. No history of ear surgery or recurrent ear infections.    Past Medical History:   Diagnosis Date     Allergic rhinitis, cause unspecified      Chronic renal disease     normal creatinine since      Colon cancer (H)     chemotherapy     Diffuse cystic mastopathy     calcifications on the right breast - followed closely     Hypertension      Personal history of malignant neoplasm of large intestine     had chemotherapy after colon resection -      Unspecified glaucoma(365.9)     Early - followed by  Opthamologist - both eyes       Past Surgical History:   Procedure Laterality Date     AS TOTAL HIP ARTHROPLASTY Right 2017    done at [facility]     BIOPSY  1975    Age 30     COLONOSCOPY N/A 10/10/2022    Procedure: COLONOSCOPY, WITH POLYPECTOMY;  Surgeon: Dianna La MD;  Location: [facility] OR     ORTHOPEDIC SURGERY  ,     [facility] REMOVAL COLON/ILEOSTOMY      8 inches of colon removed - not clear what section       Family History   Problem Relation Age of Onset     Heart Disease Mother          of heart Attack at age 75     Hypertension Mother      Cancer Father         Prostate      Diabetes Father      Prostate Cancer Father      Allergies Father      Cancer - colorectal Maternal Grandmother         Colon Resection     Breast Cancer Maternal Grandmother      Cancer - colorectal Maternal Uncle      Glaucoma No family hx of      Macular Degeneration No family hx of      Melanoma No family hx of      Skin Cancer No family hx of        Social History     Tobacco Use     Smoking status: Never     Smokeless tobacco: Never   Vaping Use     Vaping Use: Never used   Substance Use Topics     Alcohol use: Never     Comment: rarely     Drug use: No       Physical examination:  Constitutional:  In no acute distress, appears stated age. She is able to conduct this visit aurally although I need to speak slow and loud.  Eyes:  Extraocular movements intact, no spontaneous nystagmus  Ears:  Both ears examined under the microscope.  Ear canals clear, TMs intact with aerated middle ears.  Respiratory:  No increased work of breathing, wheezing or stridor  Musculoskeletal:  Good upper extremity strength  Skin:  No rashes on the head and neck  Neurologic:  House Brackman 1/6 bilaterally, ambulating normally  Psychiatric:  Alert, normal affect, answering questions appropriately    Audiogram:  Audiogram was independently reviewed. Right severe sensorineural thresholds but with 4% speech discrimination, left moderate sensorineural hearing loss with 44% speech discrimination, normal tympanograms, absent reflexes except a present left contralateral reflex.    Imaging: Outside brain MRI from 2020 and 2013 were independently reviewed. 2020 MRI was without contrast but there were no shadows noted in the IACs/ on T2 imaging. 2013 MRI showed no enhancing lesions.    Outside records from Dr. Wilkerson were independently reviewed and summarized above.    Assessment and plan:  Bilateral moderate to severe sensorineural thresholds but with very poor speech discrimination on the right and poor speech discrimination on the left.  We had a discussion regarding potential cochlear implantation given her poor bilateral speech discrimination. She had never been told about cochlear implants and was quite interested. We had a brief discussion about how they work and potential outcomes. She would like to proceed with implant evaluation and I will see her back after that for further discussion.    Again, thank you for allowing me to participate in the care of your patient.      Sincerely,    Mariel Duran MD

## 2023-10-04 NOTE — PROGRESS NOTES
Harriet Lance is seen in consultation from Dr. Wilkerson.  She is a 78 year old female being seen for bilateral hearing loss. She reports she has long standing hearing loss in both ears with the right ear being the worse hearing ear. She has worn bilateral hearing aids but is finding them to be not as helpful as they had been in the past. Her hearing loss is progressive and she has not had any sudden losses. No pain or drainage from either ear. No vertigo, some chronic imbalance. No history of ear surgery or recurrent ear infections.    Past Medical History:   Diagnosis Date    Allergic rhinitis, cause unspecified     Chronic renal disease     normal creatinine since     Colon cancer (H)     chemotherapy    Diffuse cystic mastopathy     calcifications on the right breast - followed closely    Hypertension     Personal history of malignant neoplasm of large intestine     had chemotherapy after colon resection -     Unspecified glaucoma(365.9)     Early - followed by  Opthamologist - both eyes       Past Surgical History:   Procedure Laterality Date    AS TOTAL HIP ARTHROPLASTY Right 2017    done at [facility]    BIOPSY  1975    Age 30    COLONOSCOPY N/A 10/10/2022    Procedure: COLONOSCOPY, WITH POLYPECTOMY;  Surgeon: Dianna La MD;  [place] [facility] [place]    ORTHOPEDIC SURGERY  ,    [facility] REMOVAL COLON/ILEOSTOMY      8 inches of colon removed - not clear what section       Family History   Problem Relation Age of Onset    Heart Disease Mother          of heart Attack at age 75    Hypertension Mother     Cancer Father         Prostate    Diabetes Father     Prostate Cancer Father     Allergies Father     Cancer - colorectal Maternal Grandmother         Colon Resection    Breast Cancer Maternal Grandmother     Cancer - colorectal Maternal Uncle     Glaucoma No family hx of     Macular Degeneration No family hx of     Melanoma No family hx of     Skin Cancer No family hx of         Social History     Tobacco Use    Smoking status: Never    Smokeless tobacco: Never   Vaping Use    Vaping Use: Never used   Substance Use Topics    Alcohol use: Never     Comment: rarely    Drug use: No       Physical examination:  Constitutional:  In no acute distress, appears stated age. She is able to conduct this visit aurally although I need to speak slow and loud.  Eyes:  Extraocular movements intact, no spontaneous nystagmus  Ears:  Both ears examined under the microscope.  Ear canals clear, TMs intact with aerated middle ears.  Respiratory:  No increased work of breathing, wheezing or stridor  Musculoskeletal:  Good upper extremity strength  Skin:  No rashes on the head and neck  Neurologic:  House Brackman 1/6 bilaterally, ambulating normally  Psychiatric:  Alert, normal affect, answering questions appropriately    Audiogram:  Audiogram was independently reviewed. Right severe sensorineural thresholds but with 4% speech discrimination, left moderate sensorineural hearing loss with 44% speech discrimination, normal tympanograms, absent reflexes except a present left contralateral reflex.    Imaging: Outside brain MRI from 2020 and 2013 were independently reviewed. 2020 MRI was without contrast but there were no shadows noted in the IACs/ on T2 imaging. 2013 MRI showed no enhancing lesions.    Outside records from Dr. Wilkerson were independently reviewed and summarized above.    Assessment and plan:  Bilateral moderate to severe sensorineural thresholds but with very poor speech discrimination on the right and poor speech discrimination on the left. We had a discussion regarding potential cochlear implantation given her poor bilateral speech discrimination. She had never been told about cochlear implants and was quite interested. We had a brief discussion about how they work and potential outcomes. She would like to proceed with implant evaluation and I will see her back after that for further  discussion.

## 2023-10-09 ENCOUNTER — TELEPHONE (OUTPATIENT)
Dept: OTOLARYNGOLOGY | Facility: CLINIC | Age: 78
End: 2023-10-09
Payer: COMMERCIAL

## 2023-10-09 NOTE — TELEPHONE ENCOUNTER
FUTURE VISIT INFORMATION      FUTURE VISIT INFORMATION:  Date: 11/28/23  Time: 7:30am  Location: [facility]  REFERRAL INFORMATION:  Referring provider:  Dr. Duran  Referring providers clinic:  [facility]  Reason for visit/diagnosis  CIE    RECORDS REQUESTED FROM:       Clinic name Comments Records Status Imaging Status   [facility] OV/referral 10/4/23  Most recent Audio 10/4/23 EPIC

## 2023-10-09 NOTE — TELEPHONE ENCOUNTER
Pt misplaced her audio that she got at her appt. We are not able to email her or mychart her a copy. I am able to mail her a copy. CB number if any questions.     Renee Lo LPN

## 2023-10-11 ENCOUNTER — IMMUNIZATION (OUTPATIENT)
Dept: FAMILY MEDICINE | Facility: CLINIC | Age: 78
End: 2023-10-11
Payer: COMMERCIAL

## 2023-10-11 DIAGNOSIS — Z23 NEED FOR PROPHYLACTIC VACCINATION AND INOCULATION AGAINST INFLUENZA: Primary | ICD-10-CM

## 2023-10-11 PROCEDURE — 99207 PR NO CHARGE NURSE ONLY: CPT

## 2023-10-11 PROCEDURE — G0008 ADMIN INFLUENZA VIRUS VAC: HCPCS

## 2023-10-11 PROCEDURE — 90662 IIV NO PRSV INCREASED AG IM: CPT

## 2023-10-16 ENCOUNTER — TELEPHONE (OUTPATIENT)
Dept: OPHTHALMOLOGY | Facility: CLINIC | Age: 78
End: 2023-10-16

## 2023-10-16 ENCOUNTER — OFFICE VISIT (OUTPATIENT)
Dept: OPHTHALMOLOGY | Facility: CLINIC | Age: 78
End: 2023-10-16
Payer: COMMERCIAL

## 2023-10-16 DIAGNOSIS — H52.13 MYOPIC ASTIGMATISM OF BOTH EYES: ICD-10-CM

## 2023-10-16 DIAGNOSIS — H40.003 GLAUCOMA SUSPECT OF BOTH EYES: ICD-10-CM

## 2023-10-16 DIAGNOSIS — H52.203 MYOPIC ASTIGMATISM OF BOTH EYES: ICD-10-CM

## 2023-10-16 DIAGNOSIS — H25.813 MIXED TYPE AGE-RELATED CATARACT, BOTH EYES: ICD-10-CM

## 2023-10-16 DIAGNOSIS — H25.813 MIXED TYPE AGE-RELATED CATARACT, BOTH EYES: Primary | ICD-10-CM

## 2023-10-16 PROCEDURE — 76519 ECHO EXAM OF EYE: CPT | Performed by: OPHTHALMOLOGY

## 2023-10-16 PROCEDURE — 92014 COMPRE OPH EXAM EST PT 1/>: CPT | Performed by: OPHTHALMOLOGY

## 2023-10-16 ASSESSMENT — CONF VISUAL FIELD
OS_INFERIOR_TEMPORAL_RESTRICTION: 0
OD_SUPERIOR_NASAL_RESTRICTION: 0
OS_INFERIOR_NASAL_RESTRICTION: 0
OS_NORMAL: 1
OS_SUPERIOR_TEMPORAL_RESTRICTION: 0
METHOD: COUNTING FINGERS
OD_SUPERIOR_TEMPORAL_RESTRICTION: 0
OD_INFERIOR_NASAL_RESTRICTION: 0
OS_SUPERIOR_NASAL_RESTRICTION: 0
OD_INFERIOR_TEMPORAL_RESTRICTION: 0
OD_NORMAL: 1

## 2023-10-16 ASSESSMENT — REFRACTION_WEARINGRX
OD_CYLINDER: +0.75
OS_CYLINDER: +1.00
OD_SPHERE: -1.25
SPECS_TYPE: PAL
OS_ADD: +3.00
OS_SPHERE: -1.75
OD_AXIS: 012
OS_AXIS: 015
OD_ADD: +3.00

## 2023-10-16 ASSESSMENT — EXTERNAL EXAM - LEFT EYE: OS_EXAM: NORMAL

## 2023-10-16 ASSESSMENT — PACHYMETRY
OS_CT(UM): 552
OD_CT(UM): 553

## 2023-10-16 ASSESSMENT — TONOMETRY
IOP_METHOD: ICARE
OS_IOP_MMHG: 12
OD_IOP_MMHG: 09

## 2023-10-16 ASSESSMENT — CUP TO DISC RATIO
OD_RATIO: 0.35
OS_RATIO: 0.55

## 2023-10-16 ASSESSMENT — VISUAL ACUITY
OD_CC: 20/25
METHOD: SNELLEN - LINEAR
CORRECTION_TYPE: GLASSES
OS_CC: 20/30
OS_PH_CC+: +3
OS_PH_CC: 20/30

## 2023-10-16 ASSESSMENT — SLIT LAMP EXAM - LIDS
COMMENTS: TR MGD
COMMENTS: TR MGD

## 2023-10-16 ASSESSMENT — EXTERNAL EXAM - RIGHT EYE: OD_EXAM: NORMAL

## 2023-10-16 NOTE — PROGRESS NOTES
HPI  Harriet Lance is a 77 year old female here for comprehensive eye exam, cataracts, and glaucoma suspect.  HPI       Follow Up    In both eyes.  Context:  reading and driving.  Since onset it is gradually worsening.  Associated symptoms include glare, haloes and dryness.  Negative for eye pain.  Treatments tried include no treatments.  Pain was noted as 0/10. Additional comments: Cataracts and dry eyes             Comments    She states that her vision seems worse since her last exam.  She struggles to read small print and signs when driving.  Glare is an issue.      Carolyn Mena, COT 9:20 AM  October 16, 2023               Last edited by Carolyn Mena COT on 10/16/2023  9:20 AM.        Finding it hard to get places because she has to get so close to street signs to see them, especially at night.  HPI       Follow Up    In both eyes.  Context:  reading and driving.  Since onset it is gradually worsening.  Associated symptoms include glare, haloes and dryness.  Negative for eye pain.  Treatments tried include no treatments.  Pain was noted as 0/10. Additional comments: Cataracts and dry eyes             Comments    She states that her vision seems worse since her last exam.  She struggles to read small print and signs when driving.  Glare is an issue.      Carolyn Mena, COT 9:20 AM  October 16, 2023               Last edited by Carolyn Mena COT on 10/16/2023  9:20 AM.        PMH:  Hypertension, seasonal allergies  Past Medical History:   Diagnosis Date    Allergic rhinitis, cause unspecified     Chronic renal disease     normal creatinine since 2011    Colon cancer (H) 1998    chemotherapy    Diffuse cystic mastopathy     calcifications on the right breast - followed closely    Hypertension     Personal history of malignant neoplasm of large intestine 1998    had chemotherapy after colon resection -     Unspecified glaucoma(365.9) 2005    Early - followed by  Opthamologist - both eyes      POH:   Glasses for reading only, cataracts, glaucoma suspect, no surgery, no trauma  Oc Meds:  None (latan in the past at [facility] was taken off)  FH:  Denies any glaucoma, age related macular degeneration, or other known eye diseases       Assessment & Plan      (H25.813) Mixed type age-related cataract, both eyes - Both Eyes  Comment:   The patient was advised that the current significant symptoms of blurred vision and/or glare were primarily secondary to cataracts.  The risks, benefits, and alternatives to cataract surgery with intraocular lens implant were discussed at length, and informed consent was obtained.  The patient will schedule cataract surgery in the near future.  Pre-operative measurements were taken with the IOL Master to plan appropriate lens power and lens implant options were discussed including monofocal versus multifocal, toric for astigmatism, monovision, and refractive aim. The patient was advised of the necessity for a pre-operative physical with their primary physician.  The patient was also asked to arrange to have someone at home when returning from surgery.    Plan:  Phacoemulsification with intraocular lens implant both eyes       Surgical plan:  Best corrected visual acuity today is 20/40  2  +NSC    1+ACC    Special equipment/needs:    Anesthesia: MAC/Topical  Able to lie flat:  Yes  Dilation: moderate  5.5mm  Alpha blocker/Flomax: No  Malyugen/Iris expansion: Malyugin possibly  Anticoagulants: No  ASA  Guttata: No   Diabetes: No  Pseudoexfoliation: No pseudoexfoliation  Trauma: No  Trypan Blue: No  Refractive goal: plano  Cylinder:  <1D      (H40.003) Glaucoma suspect of both eyes  (primary encounter diagnosis)  Comment: based on cup to disc, was being followed at [facility] for glc since at least 2005, was on Travatan in past but was taken off in past -- K pachy: 553/552   Tmax: 20/22    HVF: Fluct in 2015, 2016     CDR: 0.3/0.5    HRT/OCT: RNFL WNL      FHX of Glc:  No    Gonio: open       Intolerant to:      Asthma/COPD: No, on po BB  Steroid Use: No    Kidney Stones: No    Sulfa Allergy:No      IOP targets:L20s -- IOP good mid to low teens    Plan: had testing in March which was stable, good intraocular pressure today, observe       (H52.203) Myopic astigmatism of both eyes    Comment: mild changes since last visit, but has had >1D myopic shift over last 5-7 years , visual acuity is limited by cataract   Plan:  hold prescription until after cataract surgery  for discomfort    -----------------------------------------------------------------------------------    Patient disposition:   Return in about 1 month (around 11/16/2023) for post-op cataract sx, POD  1 cat sx od . Call for sooner appointment as needed.    Complete documentation of historical and exam elements from today's encounter can be found in the full encounter summary report (not reduplicated in this progress note). I personally obtained the chief complaint(s) and history of present illness.  I have confirmed and edited as necessary the CC, HPI, PMH/PSH, social history, FMH, ROS, and exam/neuro findings as obtained by the technician or others. I have examined this patient myself and I personally viewed the image(s) and studies listed above and the documentation reflects my findings and interpretation.  I formulated and edited as necessary the assessment and plan and discussed the findings and management plan with the patient and family.     Balbina Aguilar MD

## 2023-10-16 NOTE — NURSING NOTE
Chief Complaints and History of Present Illnesses   Patient presents with    Follow Up     Cataracts and dry eyes     Chief Complaint(s) and History of Present Illness(es)       Follow Up              Laterality: both eyes    Context: reading and driving    Course: gradually worsening    Associated symptoms: glare, haloes and dryness.  Negative for eye pain    Treatments tried: no treatments    Pain scale: 0/10    Comments: Cataracts and dry eyes              Comments    She states that her vision seems worse since her last exam.  She struggles to read small print and signs when driving.  Glare is an issue.      Carolyn Mena, COT 9:20 AM  October 16, 2023

## 2023-10-16 NOTE — TELEPHONE ENCOUNTER
Called patient to schedule surgery with Dr. Aguilar    Date of Surgery: 11/13,11/20    Location of surgery: [facility]    Pre-Op H&P: PCP    Pre/Post Imaging:  No    Post-Op Appt Date: 11/14,11/21,12/19    Surgery Packet Mailed: yes    Additional comments: Spoke with patient, he is aware of above dates, will review packet and reach out with any questions           Anna C. Schoenecker on 10/16/2023 at 2:46 PM

## 2023-10-23 ENCOUNTER — OFFICE VISIT (OUTPATIENT)
Dept: FAMILY MEDICINE | Facility: CLINIC | Age: 78
End: 2023-10-23
Payer: COMMERCIAL

## 2023-10-23 VITALS
BODY MASS INDEX: 31.06 KG/M2 | DIASTOLIC BLOOD PRESSURE: 73 MMHG | TEMPERATURE: 97.2 F | SYSTOLIC BLOOD PRESSURE: 126 MMHG | RESPIRATION RATE: 16 BRPM | HEART RATE: 46 BPM | HEIGHT: 65 IN | WEIGHT: 186.4 LBS | OXYGEN SATURATION: 97 %

## 2023-10-23 DIAGNOSIS — N18.30 STAGE 3 CHRONIC KIDNEY DISEASE, UNSPECIFIED WHETHER STAGE 3A OR 3B CKD (H): ICD-10-CM

## 2023-10-23 DIAGNOSIS — H25.9 AGE-RELATED CATARACT OF BOTH EYES, UNSPECIFIED AGE-RELATED CATARACT TYPE: ICD-10-CM

## 2023-10-23 DIAGNOSIS — Z01.818 PREOP GENERAL PHYSICAL EXAM: Primary | ICD-10-CM

## 2023-10-23 DIAGNOSIS — E78.5 HYPERLIPIDEMIA WITH TARGET LDL LESS THAN 130: ICD-10-CM

## 2023-10-23 DIAGNOSIS — I10 HYPERTENSION GOAL BP (BLOOD PRESSURE) < 140/90: ICD-10-CM

## 2023-10-23 DIAGNOSIS — Z29.11 NEED FOR VACCINATION AGAINST RESPIRATORY SYNCYTIAL VIRUS: ICD-10-CM

## 2023-10-23 PROCEDURE — 99214 OFFICE O/P EST MOD 30 MIN: CPT | Performed by: FAMILY MEDICINE

## 2023-10-23 RX ORDER — RESPIRATORY SYNCYTIAL VIRUS VACCINE 120MCG/0.5
0.5 KIT INTRAMUSCULAR ONCE
Qty: 1 EACH | Refills: 0 | Status: SHIPPED | OUTPATIENT
Start: 2023-10-23 | End: 2023-10-23

## 2023-10-23 ASSESSMENT — PAIN SCALES - GENERAL: PAINLEVEL: NO PAIN (0)

## 2023-10-23 NOTE — PROGRESS NOTES
[facility]  [address], [address]  [facility] [address]  Phone: [phone]  Primary Provider: Jolanta Wilkerson  Pre-op Performing Provider: ARINA WATSON      PREOPERATIVE EVALUATION:  Today's date: 10/23/2023    Harriet is a 78 year old female who presents for a preoperative evaluation.      10/23/2023    11:48 AM   Additional Questions   Roomed by Raya PERRY       Surgical Information:  Surgery/Procedure: RIGHT AND LEFT EYE PHACOEMULSIFICATION, CATARACT, WITH STANDARD INTRAOCULAR LENS IMPLANT INSERTION   Surgery Location: [facility]  Surgeon: Balbina Aguilar MD   Surgery Date: 11/13/2023 and 11/20/2023  Time of Surgery: 1:40pm and 12;00pm  Where patient plans to recover: At home with family  Fax number for surgical facility: Note does not need to be faxed, will be available electronically in Epic.    Assessment & Plan     The proposed surgical procedure is considered LOW risk.    Preop general physical exam  She is cleared to proceed with cataract surgery as scheduled.    Age-related cataract of both eyes, unspecified age-related cataract type    Hypertension goal BP (blood pressure) < 140/90  Blood pressure is well controlled on her current medications.  She is going to hold hydrochlorothiazide on the morning of surgery.  This issue has been stable.    Stage 3 chronic kidney disease, unspecified whether stage 3a or 3b CKD (H)  Her creatinine was in the normal range when it was last checked on 6/16/2023.    Hyperlipidemia with target LDL less than 130  This issue is stable on pravastatin.    Need for vaccination against respiratory syncytial virus        - No identified additional risk factors other than previously addressed    Antiplatelet or Anticoagulation Medication Instructions:   - Bleeding risk is low for this procedure (e.g. dental, skin, cataract).    Additional Medication Instructions:  Patient is to take all scheduled medications on the  day of surgery EXCEPT for modifications listed below:    Hold hydrochlorothiazide on morning of surgery.    RECOMMENDATION:  APPROVAL GIVEN to proceed with proposed procedure, without further diagnostic evaluation.            Subjective       HPI related to upcoming procedure:   She describes noticing worsening vision over the past 6 months due to cataracts.  She has a medical history significant for hypertension, hyperlipidemia and CKD stage III.  He feels like this if she is have been stable on her current medications.  Labs from 6/16/2023 were stable.        10/17/2023     7:13 PM   Preop Questions   1. Have you ever had a heart attack or stroke? No   2. Have you ever had surgery on your heart or blood vessels, such as a stent placement, a coronary artery bypass, or surgery on an artery in your head, neck, heart, or legs? No   3. Do you have chest pain with activity? No   4. Do you have a history of  heart failure? No   5. Do you currently have a cold, bronchitis or symptoms of other infection? No   6. Do you have a cough, shortness of breath, or wheezing? No   7. Do you or anyone in your family have previous history of blood clots? YES    8. Do you or does anyone in your family have a serious bleeding problem such as prolonged bleeding following surgeries or cuts? No   9. Have you ever had problems with anemia or been told to take iron pills? YES    10. Have you had any abnormal blood loss such as black, tarry or bloody stools, or abnormal vaginal bleeding? No   11. Have you ever had a blood transfusion? UNKNOWN    12. Are you willing to have a blood transfusion if it is medically needed before, during, or after your surgery? Yes   13. Have you or any of your relatives ever had problems with anesthesia? No   14. Do you have sleep apnea, excessive snoring or daytime drowsiness? No   15. Do you have any artifical heart valves or other implanted medical devices like a pacemaker, defibrillator, or continuous glucose  monitor? No   16. Do you have artificial joints? YES    17. Are you allergic to latex? No       Preoperative Review of :   reviewed - no record of controlled substances prescribed.          Review of Systems  CONSTITUTIONAL: NEGATIVE for fever, chills, change in weight  INTEGUMENTARY/SKIN: NEGATIVE for worrisome rashes, moles or lesions  EYES: NEGATIVE for vision changes or irritation  ENT/MOUTH: NEGATIVE for ear, mouth and throat problems  RESP: NEGATIVE for significant cough or SOB  CV: NEGATIVE for chest pain, palpitations or peripheral edema  GI: NEGATIVE for nausea, abdominal pain, heartburn, or change in bowel habits  : NEGATIVE for frequency, dysuria, or hematuria  MUSCULOSKELETAL: NEGATIVE for significant arthralgias or myalgia  NEURO: NEGATIVE for weakness, dizziness or paresthesias  ENDOCRINE: NEGATIVE for temperature intolerance, skin/hair changes  HEME: NEGATIVE for bleeding problems  PSYCHIATRIC: NEGATIVE for changes in mood or affect    Patient Active Problem List    Diagnosis Date Noted    Mixed type age-related cataract, both eyes 10/16/2023     Priority: Medium    Stage 3 chronic kidney disease, unspecified whether stage 3a or 3b CKD (H) 05/04/2022     Priority: Medium    Benign neoplasm of colon 10/11/2017     Priority: Medium     Polyp found on colonoscopy 10/2/2017 -   Mixed serrated/adenomatous polyp 4mm  Repeat colonoscopy in 5 years      BMI 37.0-37.9, adult 02/15/2016     Priority: Medium    Hyperlipidemia with target LDL less than 130 06/19/2015     Priority: Medium     Diagnosis updated by automated process. Provider to review and confirm.      HL (hearing loss) 11/25/2014     Priority: Medium     Going to [facility]  Getting MRI of the inner ear - 2014  More than normal hearing loss      Arthritis of hip 03/10/2014     Priority: Medium     B/l   Plans to have hip replacement April 2017 - at [facility]      Advanced directives, counseling/discussion 08/01/2012     Priority:  Medium     Discussed advance care planning with patient; information given to patient to review. 8/1/2012   ALEC Spaulding CMA    Honoring choices letter mailed.  8-  RT Ashley (R)        Hypertension goal BP (blood pressure) < 140/90 03/22/2011     Priority: Medium    Borderline glaucoma (glaucoma suspect)      Priority: Medium     Early - followed by [facility] Optham[facility] - both eyes  Problem list name updated by automated process. Provider to review    1)Glc Suspect -- [place] [facility] [place] at least 2005, was on Travatan in past but was taken off in past -- K pachy:    Tmax: 20/22    HVF: Fluct in 2015     CDR:     HRT/OCT:       FHX of Glc:      Gonio:       Intolerant to:      Asthma/COPD: No, on po BB  Steroid Use:     Kidney Stones:     Sulfa Allergy:      IOP targets:  2)NS OU      CARDIOVASCULAR SCREENING; LDL GOAL LESS THAN 100 10/31/2010     Priority: Medium    Enlarged lymph nodes 03/21/2006     Priority: Medium     Problem list name updated by automated process. Provider to review      Allergic rhinitis 06/30/2004     Priority: Medium     Problem list name updated by automated process. Provider to review      Diffuse cystic mastopathy 06/30/2004     Priority: Medium    History of malignant neoplasm of large intestine 06/30/2004     Priority: Medium     Problem list name updated by automated process. Provider to review        Past Medical History:   Diagnosis Date    Allergic rhinitis, cause unspecified     Chronic renal disease     normal creatinine since 2011    Colon cancer (H) 1998    chemotherapy    Diffuse cystic mastopathy     calcifications on the right breast - followed closely    Hypertension     Personal history of malignant neoplasm of large intestine 1998    had chemotherapy after colon resection -     Unspecified glaucoma(365.9) 2005    Early - followed by  Opthamologist - both eyes     Past Surgical History:   Procedure Laterality Date    AS TOTAL HIP  "ARTHROPLASTY Right 04/13/2017    done at [facility]    BIOPSY  1975    Age 30    COLONOSCOPY N/A 10/10/2022    Procedure: COLONOSCOPY, WITH POLYPECTOMY;  Surgeon: Dianna La MD;  Location: [facility] OR    GYN SURGERY      ORTHOPEDIC SURGERY  2017,2018    Z REMOVAL COLON/ILEOSTOMY  1998    8 inches of colon removed - not clear what section     Current Outpatient Medications   Medication Sig Dispense Refill    amLODIPine (NORVASC) 10 MG tablet Take 1 tablet (10 mg) by mouth daily 90 tablet 3    ASPIRIN 81 MG OR TABS 1 tab po QD (Once per day) 100 3    Coenzyme Q10 (CO Q10) 100 MG CAPS  30 capsule     FISH OIL 1000 MG OR CAPS 2 capsules once per day  0    hydrochlorothiazide (HYDRODIURIL) 25 MG tablet Take 1 tablet (25 mg) by mouth daily 90 tablet 3    loratadine (CLARITIN) 10 MG tablet Take 10 mg by mouth daily      losartan (COZAAR) 25 MG tablet Take 1 tablet (25 mg) by mouth daily 90 tablet 3    metoprolol succinate ER (TOPROL XL) 50 MG 24 hr tablet Take by mouth one in the morning and one in the afternoon 90 tablet 3    Multiple Vitamins-Minerals (I-DARBY) TABS Take 2 tablets by mouth daily 30 tablet 0    pravastatin (PRAVACHOL) 40 MG tablet Take 1 tablet (40 mg) by mouth daily 90 tablet 3    vitamin D3 (CHOLECALCIFEROL) 2000 units tablet Take 1 tablet by mouth daily 30 tablet 0       Allergies   Allergen Reactions    Hay Fever & [A.R.M.]     Amoxicillin Rash     Onset of raised red rash on torso and arms. Face blotchy and throat scratchy.  Noted on 10/4/16    Codeine Rash     Unclear if this is a reaction to amoxicillin or codeine.        Social History     Tobacco Use    Smoking status: Never    Smokeless tobacco: Never   Substance Use Topics    Alcohol use: Never     Comment: rarely       History   Drug Use No         Objective     /73 (BP Location: Left arm, Patient Position: Sitting, Cuff Size: Adult Regular)   Pulse (!) 46   Temp 97.2  F (36.2  C) (Temporal)   Resp 16   Ht 1.651 m (5' 5\")   Wt " 84.6 kg (186 lb 6.4 oz)   SpO2 97%   BMI 31.02 kg/m      Physical Exam    GENERAL APPEARANCE: healthy, alert and no distress     EYES: EOMI,  PERRL     HENT: nose and mouth without ulcers or lesions     NECK: no adenopathy, no asymmetry, masses, or scars and thyroid normal to palpation     RESP: lungs clear to auscultation - no rales, rhonchi or wheezes     CV: regular rates and rhythm, normal S1 S2, no S3 or S4 and no murmur, click or rub     ABDOMEN:  soft, nontender, no HSM or masses and bowel sounds normal     MS: extremities normal- no gross deformities noted, no evidence of inflammation in joints, FROM in all extremities.     SKIN: no suspicious lesions or rashes     NEURO: Normal strength and tone, sensory exam grossly normal, mentation intact and speech normal     PSYCH: mentation appears normal. and affect normal/bright     LYMPHATICS: No cervical adenopathy    Recent Labs   Lab Test 06/16/23  0840 05/04/22  1053   HGB 13.5  --      --     140   POTASSIUM 3.7 3.5   CR 0.95 0.96        Diagnostics:  No labs were ordered during this visit.   No EKG required for low risk surgery (cataract, skin procedure, breast biopsy, etc).    Revised Cardiac Risk Index (RCRI):  The patient has the following serious cardiovascular risks for perioperative complications:   - No serious cardiac risks = 0 points     RCRI Interpretation: 0 points: Class I (very low risk - 0.4% complication rate)         Signed Electronically by: Roosevelt Hernandez DO  Copy of this evaluation report is provided to requesting physician.

## 2023-10-23 NOTE — PATIENT INSTRUCTIONS
Preparing for Your Surgery  Getting started  A nurse will call you to review your health history and instructions. They will give you an arrival time based on your scheduled surgery time. Please be ready to share:  Your doctor's clinic name and phone number  Your medical, surgical, and anesthesia history  A list of allergies and sensitivities  A list of medicines, including herbal treatments and over-the-counter drugs  Whether the patient has a legal guardian (ask how to send us the papers in advance)  Please tell us if you're pregnant--or if there's any chance you might be pregnant. Some surgeries may injure a fetus (unborn baby), so they require a pregnancy test. Surgeries that are safe for a fetus don't always need a test, and you can choose whether to have one.   If you have a child who's having surgery, please ask for a copy of Preparing for Your Child's Surgery.    Preparing for surgery  Within 10 to 30 days of surgery: Have a pre-op exam (sometimes called an H&P, or History and Physical). This can be done at a clinic or pre-operative center.  If you're having a , you may not need this exam. Talk to your care team.  At your pre-op exam, talk to your care team about all medicines you take. If you need to stop any medicines before surgery, ask when to start taking them again.  We do this for your safety. Many medicines can make you bleed too much during surgery. Some change how well surgery (anesthesia) drugs work.  Call your insurance company to let them know you're having surgery. (If you don't have insurance, call [phone].)  Call your clinic if there's any change in your health. This includes signs of a cold or flu (sore throat, runny nose, cough, rash, fever). It also includes a scrape or scratch near the surgery site.  If you have questions on the day of surgery, call your hospital or surgery center.  Eating and drinking guidelines  For your safety: Unless your surgeon tells you otherwise,  follow the guidelines below.  Eat and drink as usual until 8 hours before you arrive for surgery. After that, no food or milk.  Drink clear liquids until 2 hours before you arrive. These are liquids you can see through, like water, Gatorade, and Propel Water. They also include plain black coffee and tea (no cream or milk), candy, and breath mints. You can spit out gum when you arrive.  If you drink alcohol: Stop drinking it the night before surgery.  If your care team tells you to take medicine on the morning of surgery, it's okay to take it with a sip of water.  Preventing infection  Shower or bathe the night before and morning of your surgery. Follow the instructions your clinic gave you. (If no instructions, use regular soap.)  Don't shave or clip hair near your surgery site. We'll remove the hair if needed.  Don't smoke or vape the morning of surgery. You may chew nicotine gum up to 2 hours before surgery. A nicotine patch is okay.  Note: Some surgeries require you to completely quit smoking and nicotine. Check with your surgeon.  Your care team will make every effort to keep you safe from infection. We will:  Clean our hands often with soap and water (or an alcohol-based hand rub).  Clean the skin at your surgery site with a special soap that kills germs.  Give you a special gown to keep you warm. (Cold raises the risk of infection.)  Wear special hair covers, masks, gowns and gloves during surgery.  Give antibiotic medicine, if prescribed. Not all surgeries need antibiotics.  What to bring on the day of surgery  Photo ID and insurance card  Copy of your health care directive, if you have one  Glasses and hearing aids (bring cases)  You can't wear contacts during surgery  Inhaler and eye drops, if you use them (tell us about these when you arrive)  CPAP machine or breathing device, if you use them  A few personal items, if spending the night  If you have . . .  A pacemaker, ICD (cardiac defibrillator) or other  implant: Bring the ID card.  An implanted stimulator: Bring the remote control.  A legal guardian: Bring a copy of the certified (court-stamped) guardianship papers.  Please remove any jewelry, including body piercings. Leave jewelry and other valuables at home.  If you're going home the day of surgery  You must have a responsible adult drive you home. They should stay with you overnight as well.  If you don't have someone to stay with you, and you aren't safe to go home alone, we may keep you overnight. Insurance often won't pay for this.  After surgery  If it's hard to control your pain or you need more pain medicine, please call your surgeon's office.  Questions?   If you have any questions for your care team, list them here: _________________________________________________________________________________________________________________________________________________________________________ ____________________________________ ____________________________________ ____________________________________  For informational purposes only. Not to replace the advice of your health care provider. Copyright   2003, 2019 [employer]. All rights reserved. Clinically reviewed by Stephanie Harris MD. [employer] 670662 - REV 12/22.    How to Take Your Medication Before Surgery  - HOLD (do not take) your HYDROCHLOROTHIAZIDE on the morning of surgery.

## 2023-10-26 ENCOUNTER — TELEPHONE (OUTPATIENT)
Dept: AUDIOLOGY | Facility: CLINIC | Age: 78
End: 2023-10-26
Payer: COMMERCIAL

## 2023-10-26 NOTE — TELEPHONE ENCOUNTER
NIMO w/ CI coordinator # for questions on upcoming appt & background info intake.    -Porsche JIN 10/26/23    ______  CI Eval Intake  *Incomplete    Referring Provider and clinic:  Dr. Mariel Duran    Have you seen any other Audiologist or ENT provider: yes or no     Yes - when and where?     Do you wear hearing aids: yes or no     Yes - how long have you been wearing hearing aids?     Have you ever had ear surgery: yes or no     Yes - when and where? Who was your surgeon?     Have you had any imaging done of your head: yes or no     Yes - MRI or CT / When and where?     Have you had an Audiogram done in the last 6 months: yes or no     Yes - when and where?     What is the best way to contact you- Phone, [facility] or email?     Do you have MyChart set up? - help set up MyChart if not setup      Is there someone we can contact on your behalf for communications assistance?:     If we need you to sign a release of information to obtain your records would you prefer that be mailed to you or emailed?

## 2023-11-09 DIAGNOSIS — Z96.1 PSEUDOPHAKIA: Primary | ICD-10-CM

## 2023-11-09 RX ORDER — PREDNISOLONE ACETATE 10 MG/ML
SUSPENSION/ DROPS OPHTHALMIC
Qty: 5 ML | Refills: 1 | Status: SHIPPED | OUTPATIENT
Start: 2023-11-09 | End: 2024-09-05

## 2023-11-09 RX ORDER — KETOROLAC TROMETHAMINE 4 MG/ML
SOLUTION/ DROPS OPHTHALMIC
Qty: 5 ML | Refills: 1 | Status: SHIPPED | OUTPATIENT
Start: 2023-11-09 | End: 2024-09-05

## 2023-11-10 ENCOUNTER — ANESTHESIA EVENT (OUTPATIENT)
Dept: SURGERY | Facility: AMBULATORY SURGERY CENTER | Age: 78
End: 2023-11-10
Payer: COMMERCIAL

## 2023-11-13 ENCOUNTER — ANESTHESIA (OUTPATIENT)
Dept: SURGERY | Facility: AMBULATORY SURGERY CENTER | Age: 78
End: 2023-11-13
Payer: COMMERCIAL

## 2023-11-13 ENCOUNTER — HOSPITAL ENCOUNTER (OUTPATIENT)
Facility: AMBULATORY SURGERY CENTER | Age: 78
Discharge: HOME | End: 2023-11-13
Attending: OPHTHALMOLOGY
Payer: COMMERCIAL

## 2023-11-13 VITALS
SYSTOLIC BLOOD PRESSURE: 96 MMHG | HEART RATE: 51 BPM | TEMPERATURE: 97 F | DIASTOLIC BLOOD PRESSURE: 57 MMHG | RESPIRATION RATE: 16 BRPM | OXYGEN SATURATION: 100 %

## 2023-11-13 DIAGNOSIS — H25.813 MIXED TYPE AGE-RELATED CATARACT, BOTH EYES: Primary | ICD-10-CM

## 2023-11-13 PROCEDURE — 66984 XCAPSL CTRC RMVL W/O ECP: CPT | Mod: RT | Performed by: OPHTHALMOLOGY

## 2023-11-13 PROCEDURE — 66984 XCAPSL CTRC RMVL W/O ECP: CPT | Mod: RT

## 2023-11-13 DEVICE — LENS CC60WF 20.0 CLAREON UV ASPHERIC BICONVEX IOL: Type: IMPLANTABLE DEVICE | Site: EYE | Status: FUNCTIONAL

## 2023-11-13 RX ORDER — BALANCED SALT SOLUTION 6.4; .75; .48; .3; 3.9; 1.7 MG/ML; MG/ML; MG/ML; MG/ML; MG/ML; MG/ML
SOLUTION OPHTHALMIC PRN
Status: DISCONTINUED | OUTPATIENT
Start: 2023-11-13 | End: 2023-11-13 | Stop reason: HOSPADM

## 2023-11-13 RX ORDER — FENTANYL CITRATE 50 UG/ML
50 INJECTION, SOLUTION INTRAMUSCULAR; INTRAVENOUS
Status: DISCONTINUED | OUTPATIENT
Start: 2023-11-13 | End: 2023-11-14 | Stop reason: HOSPADM

## 2023-11-13 RX ORDER — CYCLOPENTOLAT/TROPIC/PHENYLEPH 1%-1%-2.5%
1 DROPS (EA) OPHTHALMIC (EYE)
Status: COMPLETED | OUTPATIENT
Start: 2023-11-13 | End: 2023-11-13

## 2023-11-13 RX ORDER — ACETAMINOPHEN 325 MG/1
975 TABLET ORAL ONCE
Status: DISCONTINUED | OUTPATIENT
Start: 2023-11-13 | End: 2023-11-14 | Stop reason: HOSPADM

## 2023-11-13 RX ORDER — PROPARACAINE HYDROCHLORIDE 5 MG/ML
1 SOLUTION/ DROPS OPHTHALMIC ONCE
Status: COMPLETED | OUTPATIENT
Start: 2023-11-13 | End: 2023-11-13

## 2023-11-13 RX ORDER — LIDOCAINE HYDROCHLORIDE 10 MG/ML
INJECTION, SOLUTION EPIDURAL; INFILTRATION; INTRACAUDAL; PERINEURAL PRN
Status: DISCONTINUED | OUTPATIENT
Start: 2023-11-13 | End: 2023-11-13 | Stop reason: HOSPADM

## 2023-11-13 RX ORDER — PREDNISOLONE ACETATE 1 %
SUSPENSION, DROPS(FINAL DOSAGE FORM)(ML) OPHTHALMIC (EYE) PRN
Status: DISCONTINUED | OUTPATIENT
Start: 2023-11-13 | End: 2023-11-13 | Stop reason: HOSPADM

## 2023-11-13 RX ORDER — ACETAMINOPHEN 325 MG/1
975 TABLET ORAL ONCE
Status: COMPLETED | OUTPATIENT
Start: 2023-11-13 | End: 2023-11-13

## 2023-11-13 RX ORDER — MOXIFLOXACIN IN NACL,ISO-OS/PF 0.3MG/0.3
SYRINGE (ML) INTRAOCULAR PRN
Status: DISCONTINUED | OUTPATIENT
Start: 2023-11-13 | End: 2023-11-13 | Stop reason: HOSPADM

## 2023-11-13 RX ORDER — OXYCODONE HYDROCHLORIDE 5 MG/1
5 TABLET ORAL
Status: DISCONTINUED | OUTPATIENT
Start: 2023-11-13 | End: 2023-11-14 | Stop reason: HOSPADM

## 2023-11-13 RX ORDER — DICLOFENAC SODIUM 1 MG/ML
1 SOLUTION/ DROPS OPHTHALMIC
Status: COMPLETED | OUTPATIENT
Start: 2023-11-13 | End: 2023-11-13

## 2023-11-13 RX ORDER — MOXIFLOXACIN 5 MG/ML
1 SOLUTION/ DROPS OPHTHALMIC
Status: COMPLETED | OUTPATIENT
Start: 2023-11-13 | End: 2023-11-13

## 2023-11-13 RX ORDER — TETRACAINE HYDROCHLORIDE 5 MG/ML
SOLUTION OPHTHALMIC PRN
Status: DISCONTINUED | OUTPATIENT
Start: 2023-11-13 | End: 2023-11-13 | Stop reason: HOSPADM

## 2023-11-13 RX ORDER — SODIUM CHLORIDE, SODIUM LACTATE, POTASSIUM CHLORIDE, CALCIUM CHLORIDE 600; 310; 30; 20 MG/100ML; MG/100ML; MG/100ML; MG/100ML
INJECTION, SOLUTION INTRAVENOUS CONTINUOUS
Status: DISCONTINUED | OUTPATIENT
Start: 2023-11-13 | End: 2023-11-13 | Stop reason: HOSPADM

## 2023-11-13 RX ORDER — ONDANSETRON 2 MG/ML
4 INJECTION INTRAMUSCULAR; INTRAVENOUS EVERY 30 MIN PRN
Status: DISCONTINUED | OUTPATIENT
Start: 2023-11-13 | End: 2023-11-14 | Stop reason: HOSPADM

## 2023-11-13 RX ORDER — LIDOCAINE 40 MG/G
CREAM TOPICAL
Status: DISCONTINUED | OUTPATIENT
Start: 2023-11-13 | End: 2023-11-13 | Stop reason: HOSPADM

## 2023-11-13 RX ORDER — OXYCODONE HYDROCHLORIDE 5 MG/1
10 TABLET ORAL
Status: DISCONTINUED | OUTPATIENT
Start: 2023-11-13 | End: 2023-11-14 | Stop reason: HOSPADM

## 2023-11-13 RX ORDER — SODIUM CHLORIDE, SODIUM LACTATE, POTASSIUM CHLORIDE, CALCIUM CHLORIDE 600; 310; 30; 20 MG/100ML; MG/100ML; MG/100ML; MG/100ML
INJECTION, SOLUTION INTRAVENOUS CONTINUOUS
Status: DISCONTINUED | OUTPATIENT
Start: 2023-11-13 | End: 2023-11-14 | Stop reason: HOSPADM

## 2023-11-13 RX ORDER — ONDANSETRON 4 MG/1
4 TABLET, ORALLY DISINTEGRATING ORAL EVERY 30 MIN PRN
Status: DISCONTINUED | OUTPATIENT
Start: 2023-11-13 | End: 2023-11-14 | Stop reason: HOSPADM

## 2023-11-13 RX ADMIN — MOXIFLOXACIN 1 DROP: 5 SOLUTION/ DROPS OPHTHALMIC at 11:16

## 2023-11-13 RX ADMIN — ACETAMINOPHEN 975 MG: 325 TABLET ORAL at 11:13

## 2023-11-13 RX ADMIN — MOXIFLOXACIN 1 DROP: 5 SOLUTION/ DROPS OPHTHALMIC at 11:18

## 2023-11-13 RX ADMIN — DICLOFENAC SODIUM 1 DROP: 1 SOLUTION/ DROPS OPHTHALMIC at 11:20

## 2023-11-13 RX ADMIN — Medication 1 DROP: at 11:24

## 2023-11-13 RX ADMIN — SODIUM CHLORIDE, SODIUM LACTATE, POTASSIUM CHLORIDE, CALCIUM CHLORIDE: 600; 310; 30; 20 INJECTION, SOLUTION INTRAVENOUS at 11:28

## 2023-11-13 RX ADMIN — PROPARACAINE HYDROCHLORIDE 1 DROP: 5 SOLUTION/ DROPS OPHTHALMIC at 11:14

## 2023-11-13 RX ADMIN — DICLOFENAC SODIUM 1 DROP: 1 SOLUTION/ DROPS OPHTHALMIC at 11:19

## 2023-11-13 RX ADMIN — Medication 1 DROP: at 11:22

## 2023-11-13 RX ADMIN — DICLOFENAC SODIUM 1 DROP: 1 SOLUTION/ DROPS OPHTHALMIC at 11:21

## 2023-11-13 RX ADMIN — MOXIFLOXACIN 1 DROP: 5 SOLUTION/ DROPS OPHTHALMIC at 11:17

## 2023-11-13 RX ADMIN — Medication 1 DROP: at 11:27

## 2023-11-13 NOTE — OP NOTE
Pre-operative Diagnosis: Visually significant cataract, Right eye    Post-operative Diagnosis:  Pseudophakia, Right     Operative Procedure:  Phacoemulsification with intraocular lens implantation, Right eye     Surgeon(s):  Balbina Aguilar MD    Anesthesia:   Topical/MAC  Findings:  No unusual findings   Blood Loss:    Minimal  Implants:   Implant Name Type Inv. Item Serial No.  Lot No. LRB No. Used Action   LENS [id number] 20.0 [employer] UV ASPHERIC BICONVEX IOL - [id number] Lens/Eye Implant LENS [id number] 20.0 [employer] UV ASPHERIC BICONVEX IOL [id number] [employer]  Right 1 Implanted     Specimens:  None   Complications:  none   Condition:  stable    Indications: The patient Harriet Lance presented to the eye clinic with decreased vision secondary to cataract in the Right eye. The risks, benefits and alternatives to cataract extraction were discussed. The patient elected to proceed. All questions were answered to the patient's satisfaction.    Description of Procedure:   Prior to the procedure, appropriate cardiac and respiratory monitors were applied to the patient. The patient was brought to the operating room where a drop of topical tetracaine was given followed by lidocaine gel followed by povidone iodine.  A surgical pause was carried out to identify with all members of the surgical team the correct surgical site.  With adequate anesthesia, the Right eye was prepped and draped in the usual sterile fashion. A lid speculum was placed, and the operating microscope was rotated into position.  A paracentesis was created to the left of the planned temporal incision.  Through this limbal paracentesis, the anterior chamber was filled with preservative-free lidocaine followed by dispersive viscoelastic.  A temporal wound was created at the limbus using a 2.5 mm keratome blade.  A capsulorrhexis was initiated using a bent 25-gauge needle and was completed in continuous and circular fashion using the  capsulorrhexis forceps. The lens nucleus was hydrodissected using balanced salt solution.  The lens nucleus was rotated and removed using phacoemulsification.  Residual cortical material was removed using irrigation-aspiration.  The capsular bag was reinflated to its maximal extent with cohesive viscoelastic.  An intraocular lens implant of the above listed power was inserted into the capsular bag.  The lens power was reviewed using the preoperative intraocular lens power measurements to confirm that the correct lens was used for the desired post-operative refractive state.  The residual viscoelastic was removed in its entirety, the wound were hydrated and found to be self-sealing.  A dose of 0.1mg of intracameral moxifloxacin was administered through the paracentesis.  Tactile pressure was confirmed to be in a normal range.  The lid speculum was removed and a drop of prednisolone acetate 1% and ketorolac were given before a shield was applied.  The patient tolerated the procedure well, and there were no complications.    Plan: The patient will be discharged to home and will follow up tomorrow morning in the eye clinic.

## 2023-11-13 NOTE — ANESTHESIA PREPROCEDURE EVALUATION
Anesthesia Pre-Procedure Evaluation    Patient: Harriet Lance   MRN: [id number] : 1945        Procedure : Procedure(s):  RIGHT EYE PHACOEMULSIFICATION, CATARACT, WITH STANDARD INTRAOCULAR LENS IMPLANT INSERTION          Past Medical History:   Diagnosis Date    Allergic rhinitis, cause unspecified     Chronic renal disease     normal creatinine since     Colon cancer (H)     chemotherapy    Diffuse cystic mastopathy     calcifications on the right breast - followed closely    Hypertension     Personal history of malignant neoplasm of large intestine     had chemotherapy after colon resection -     Unspecified glaucoma(365.9)     Early - followed by  Opthamologist - both eyes      Past Surgical History:   Procedure Laterality Date    AS TOTAL HIP ARTHROPLASTY Right 2017    done at [facility]    BIOPSY  1975    Age 30    COLONOSCOPY N/A 10/10/2022    Procedure: COLONOSCOPY, WITH POLYPECTOMY;  Surgeon: Dianna La MD;  Location: [facility] OR    GYN SURGERY      ORTHOPEDIC SURGERY  ,    [facility] REMOVAL COLON/ILEOSTOMY      8 inches of colon removed - not clear what section      Allergies   Allergen Reactions    Hay Fever & [A.R.M.]     Amoxicillin Rash     Onset of raised red rash on torso and arms. Face blotchy and throat scratchy.  Noted on 10/4/16    Codeine Rash     Unclear if this is a reaction to amoxicillin or codeine.      Social History     Tobacco Use    Smoking status: Never    Smokeless tobacco: Never   Substance Use Topics    Alcohol use: Never     Comment: rarely      Wt Readings from Last 1 Encounters:   10/23/23 84.6 kg (186 lb 6.4 oz)        Anesthesia Evaluation            ROS/MED HX  ENT/Pulmonary:       Neurologic:       Cardiovascular:     (+) Dyslipidemia hypertension- -   -  - -                                      METS/Exercise Tolerance:     Hematologic:       Musculoskeletal:   (+)  arthritis,             GI/Hepatic:       Renal/Genitourinary:     (+)  "renal disease,             Endo:     (+)               Obesity,       Psychiatric/Substance Use:       Infectious Disease:       Malignancy:       Other:            Physical Exam    Airway        Mallampati: III       Respiratory Devices and Support         Dental       (+) Minor Abnormalities - some fillings, tiny chips      Cardiovascular          Rhythm and rate: regular     Pulmonary           breath sounds clear to auscultation           OUTSIDE LABS:  CBC:   Lab Results   Component Value Date    WBC 7.1 06/16/2023    WBC 6.6 01/21/2020    HGB 13.5 06/16/2023    HGB 14.0 01/21/2020    HCT 40.5 06/16/2023    HCT 42.9 01/21/2020     06/16/2023     01/21/2020     BMP:   Lab Results   Component Value Date     06/16/2023     05/04/2022    POTASSIUM 3.7 06/16/2023    POTASSIUM 3.5 05/04/2022    CHLORIDE 103 06/16/2023    CHLORIDE 107 05/04/2022    CO2 25 06/16/2023    CO2 29 05/04/2022    BUN 14.5 06/16/2023    BUN 14 05/04/2022    CR 0.95 06/16/2023    CR 0.96 05/04/2022     (H) 06/16/2023     (H) 05/04/2022     COAGS:   Lab Results   Component Value Date    INR 1.92 (H) 07/02/2018     POC: No results found for: \"BGM\", \"HCG\", \"HCGS\"  HEPATIC:   Lab Results   Component Value Date    ALBUMIN 4.4 06/16/2023    PROTTOTAL 8.3 06/16/2023    ALT 25 06/16/2023    AST 31 06/16/2023    ALKPHOS 81 06/16/2023    BILITOTAL 0.3 06/16/2023     OTHER:   Lab Results   Component Value Date    A1C 5.8 (H) 01/26/2021    STELLA 9.7 06/16/2023    TSH 2.94 06/16/2023    CRP <2.9 04/10/2019    SED 17 04/10/2019       Anesthesia Plan    ASA Status:  3    NPO Status:  NPO Appropriate    Anesthesia Type: MAC.     - Reason for MAC: immobility needed              Consents    Anesthesia Plan(s) and associated risks, benefits, and realistic alternatives discussed. Questions answered and patient/representative(s) expressed understanding.     - Discussed:     - Discussed with:  Patient            Postoperative " Care            Comments:                Mello Mcgrath MD

## 2023-11-13 NOTE — ANESTHESIA CARE TRANSFER NOTE
Patient: Harriet Lance    Procedure: Procedure(s):  RIGHT EYE PHACOEMULSIFICATION, CATARACT, WITH STANDARD INTRAOCULAR LENS IMPLANT INSERTION       Diagnosis: Mixed type age-related cataract, both eyes [H25.813]  Diagnosis Additional Information: No value filed.    Anesthesia Type:   MAC     Note:    Oropharynx: oropharynx clear of all foreign objects and spontaneously breathing  Level of Consciousness: awake  Oxygen Supplementation: room air    Independent Airway: airway patency satisfactory and stable  Dentition: dentition unchanged  Vital Signs Stable: post-procedure vital signs reviewed and stable  Report to RN Given: handoff report given  Patient transferred to: Phase II    Handoff Report: Identifed the Patient, Identified the Reponsible Provider, Reviewed the pertinent medical history, Discussed the surgical course, Reviewed Intra-OP anesthesia mangement and issues during anesthesia, Set expectations for post-procedure period and Allowed opportunity for questions and acknowledgement of understanding      Vitals:  Vitals Value Taken Time   BP     Temp 36  C (96.8  F) 11/13/23 1311   Pulse 51    Resp 14 11/13/23 1311   SpO2 97 % 11/13/23 1311       Electronically Signed By: ROBERTO Savage CRNA  November 13, 2023  1:13 PM

## 2023-11-13 NOTE — DISCHARGE INSTRUCTIONS
[facility]  Home Care Following Anesthesia  For 24 hours after surgery:  Get plenty of rest.  A responsible adult must stay with you for at least 24 hours after you leave the surgery center.  Do not drive or use heavy equipment.  If you have weakness or tingling, don't drive or use heavy equipment until this feeling goes away.   Do not drink alcohol.   Avoid strenuous or risky activities.  Ask for help when climbing stairs.  You may feel lightheaded.  IF so, sit for a few minutes before standing.  Have someone help you get up.   If you have nausea (feel sick to your stomach): Drink only clear liquids such as apple juice, ginger ale, broth or 7-Up.  Rest may also help.  Be sure to drink enough fluids.  Move to a regular diet as you feel able.   You may have a slight fever.  Call the doctor if your fever is over 100 F (37.7 C) (taken under the tongue) or lasts longer than 24 hours.  You may have a dry mouth, a sore throat, muscle aches or trouble sleeping. These should go away after 24 hours.  Do not make important or legal decisions.   It is recommended to avoid smoking.               Tips for taking pain medications  To get the best pain relief possible, remember these points:  Take pain medications as directed, before pain becomes severe.  Pain medication can upset your stomach: taking it with food may help.  Constipation is a common side effect of pain medication. Drink plenty of  fluids.  Eat foods high in fiber. Take a stool softener if recommended by your doctor or pharmacist.  Do not drink alcohol, drive or operate machinery while taking pain medications.  Ask about other ways to control pain, such as with heat, ice or relaxation.    Tylenol/Acetaminophen Consumption    If you feel your pain relief is insufficient, you may take Tylenol/Acetaminophen in addition to your narcotic pain medication.   Be careful not to exceed 4,000 mg of Tylenol/Acetaminophen in a 24 hour  period from all sources.  If you are taking extra strength Tylenol/acetaminophen (500 mg), the maximum dose is 8 tablets in 24 hours.  If you are taking regular strength acetaminophen (325 mg), the maximum dose is 12 tablets in 24 hours.    Call a doctor for any of the following:  Signs of infection (fever, growing tenderness at the surgery site, a large amount of drainage or bleeding, severe pain, foul-smelling drainage, redness, swelling).  It has been over 8 to 10 hours since surgery and you are still not able to urinate (pass water).  Headache for over 24 hours.  Numbness, tingling or weakness the day after surgery (if you had spinal anesthesia).  Signs of Covid-19 infection (temperature over 100 degrees, shortness of breath, cough, loss of taste/smell, generalized body aches, persistent headache, chills, sore throat, nausea/vomiting/diarrhea)  Your doctor is:  Dr. Balbina Aguilar, Ophthalmology: [phone]                    Or dial [phone] and ask for the resident on call for:  Ophthalmology  For emergency care, call the:  [facility]:  [phone] (TTY for hearing impaired: [phone])

## 2023-11-13 NOTE — ANESTHESIA POSTPROCEDURE EVALUATION
Patient: Harriet Lance    Procedure: Procedure(s):  RIGHT EYE PHACOEMULSIFICATION, CATARACT, WITH STANDARD INTRAOCULAR LENS IMPLANT INSERTION       Anesthesia Type:  MAC    Note:  Disposition: Outpatient   Postop Pain Control: Uneventful            Sign Out: Well controlled pain   PONV: No   Neuro/Psych: Uneventful            Sign Out: Acceptable/Baseline neuro status   Airway/Respiratory: Uneventful            Sign Out: Acceptable/Baseline resp. status   CV/Hemodynamics: Uneventful            Sign Out: Acceptable CV status; No obvious hypovolemia; No obvious fluid overload   Other NRE: NONE   DID A NON-ROUTINE EVENT OCCUR?            Last vitals:  Vitals Value Taken Time   BP 96/57 11/13/23 1327   Temp 36.1  C (97  F) 11/13/23 1327   Pulse     Resp 16 11/13/23 1327   SpO2 100 % 11/13/23 1327       Electronically Signed By: Mello Mcgrath MD  November 13, 2023  1:49 PM

## 2023-11-14 ENCOUNTER — OFFICE VISIT (OUTPATIENT)
Dept: OPHTHALMOLOGY | Facility: CLINIC | Age: 78
End: 2023-11-14
Payer: COMMERCIAL

## 2023-11-14 DIAGNOSIS — H25.12 NUCLEAR SCLEROTIC CATARACT OF LEFT EYE: Primary | ICD-10-CM

## 2023-11-14 PROCEDURE — 99213 OFFICE O/P EST LOW 20 MIN: CPT | Mod: 24 | Performed by: OPHTHALMOLOGY

## 2023-11-14 PROCEDURE — 76519 ECHO EXAM OF EYE: CPT | Mod: 26 | Performed by: OPHTHALMOLOGY

## 2023-11-14 ASSESSMENT — VISUAL ACUITY
OS_PH_SC+: -3
OS_SC: 20/50
METHOD: SNELLEN - LINEAR
OS_PH_SC: 20/25
OD_SC: 20/40
OD_PH_SC: 20/25
OD_SC+: -1
OS_SC+: -1
OD_PH_SC+: -1+2

## 2023-11-14 ASSESSMENT — TONOMETRY
IOP_METHOD: ICARE
OS_IOP_MMHG: 12
OD_IOP_MMHG: 13

## 2023-11-14 ASSESSMENT — EXTERNAL EXAM - LEFT EYE: OS_EXAM: NORMAL

## 2023-11-14 ASSESSMENT — PACHYMETRY
OS_CT(UM): 552
OD_CT(UM): 553

## 2023-11-14 ASSESSMENT — SLIT LAMP EXAM - LIDS
COMMENTS: TR MGD
COMMENTS: TR MGD

## 2023-11-14 ASSESSMENT — CUP TO DISC RATIO: OD_RATIO: 0.35

## 2023-11-14 ASSESSMENT — EXTERNAL EXAM - RIGHT EYE: OD_EXAM: NORMAL

## 2023-11-14 NOTE — NURSING NOTE
Chief Complaints and History of Present Illnesses   Patient presents with    Post Op (Ophthalmology) Right Eye     POD1 s/p CE/IOL right eye      Chief Complaint(s) and History of Present Illness(es)       Post Op (Ophthalmology) Right Eye              Associated symptoms: dryness.  Negative for eye pain, redness and tearing    Treatments tried: eye drops    Pain scale: 0/10    Comments: POD1 s/p CE/IOL right eye               Comments    No eye pain today. No redness or tearing.   Pt slept with eye shield but removed it this morning.   Some dryness and FBS right eye yesterday and she used Zaditor once each eye.   She feels that VA has improved.     Sunny Joel 12:33 PM November 14, 2023

## 2023-11-14 NOTE — PROGRESS NOTES
AUDIOLOGY REPORT: Adult Cochlear Implant Evaluation  SUBJECTIVE: Harriet Lance, 78 year old female was seen in the Audiology Clinic at [facility] on 11/28/2023 to assess audiologic candidacy for a cochlear implant. The evaluation was ordered by Mariel Duran MD.      Harriet had a hearing evaluation at this clinic on 10/4/23.  The patient has a diagnosis of moderately severe to severe sensorineural hearing loss in the right ear and moderate sensorineural hearing loss in the left ear.   Word recognition was 4% in the right ear and 44% in the left ear.  Tympanograms were normal in both ears.  Acoustic reflexes were absent in all conditions, except in the left contralateral condition where the reflex was elevated to 105 dB.       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      Abuse Screening:  Do you feel unsafe at home or work/school? No  Do you feel threatened by someone? No  Does anyone try to keep you from having contact with others, or doing things outside of your home? No  Physical signs of abuse present? No  Patient reports she lives alone.      Onset of hearing loss: Around age 63 bilaterally  Identification of hearing loss: Around age 66 bilaterally  Etiology of hearing loss: Unknown  Sudden or Progressive: Progressive  Age Hearing Aid fit: Around age 71 bilaterally  Duration of Hearing Aid use: 6-7 years bilaterally  Current Hearing Aid Type: [employer] RICs   Age of current Hearing Aid: 6-7 years bilaterally  Tinnitus: Present bilaterally but has learned to ignore this.   Balance: No concerns  Does patient exhibit intelligible vocalizations?  Yes  Primary Mode of Communication: Oral/aural/lipreading   Other: History of cancer of intestine with chemo.    Previous Ear Surgeries: None reported  Previous Other Surgeries: Colon resection 1998, Hip replacement 2017 and 2018      OBJECTIVE:  To evaluate candidacy for cochlear implant.  Candidacy requires at least a moderate to profound sensorineural hearing loss in at least one ear, good general health; lack of benefit from conventional amplification as determined from the tests below, psychological/emotional stability and motivationally suitable, with realistic expectations, and absence of medical conditions contraindicating surgical intervention.     The Cochlear Implant Quality of Life-10 Global measure is a patient-reported outcome measure  developed to provide an overall assessment of quality of life before and after cochlear implantation by answering 10 questions related to hearing, listening and communication in daily life.  A higher score is less perceived difficulty.   Pre operative score is 25 out of a total possible score of 50.     Tinnitus Handicap Inventory: Pre operative score is 28 out of a total possible score of 100.    Otoscopy revealed ears are clear of cerumen bilaterally.     Distortion product otoacoustic emissions (DPOAEs) were performed from  1-10kHz .  Right Ear: Absent  Left Ear: Absent    Device(s) used for Aided Testing:   Bilateral clinic loaner B[employer]a B90-UP hearing aids with Comply tips.  These aids were used for testing but were not sent home with the patient.      Electroacoustic Test Results: Electroacoustic analysis revealed patient's hearing aids were not meeting NAL-NL1 targets.  Therefore, a set of clinic loaner hearing aids was set to NAL-NL1 targets using simulated real ear measures.  The aids were working up to 's specifications.     45 minutes were spent assessing the patient's auditory rehabilitation status in order to determine whether conventional amplification is beneficial or whether the patient is an audiologic candidate for a cochlear implant.      Soundfield Thresholds (see audiogram):   Left aided: Detection in borderline normal to mild loss range.   Right aided: Detection in normal to mild loss range.     CNC Words Test: The  patient repeats 50 single syllable words, auditory only. The words are presented at 60 dB A (conversational level) through a recording.     Left ear aided: 68% words  Right ear aided: 16% words  Bilaterally aided: 64% words    AzBio Sentences Test: The patient repeats 20 sentences, auditory only.  The sentences are presented at 60 dB A (conversational level) delivered through a recording.       Left ear aided: 83% in quiet, 47% with +10 dB signal to noise ratio (SNR)  Right ear aided: 15% in quiet, Did not test (DNT) with +10 dB SNR due to poor score in quiet   Bilaterally aided: 90% in quiet, 45% with +10 dB SNR    ASSESSMENT: Limited benefit from conventional amplification in the right ear.     Ear recommended for implantation: right.     Patient is meeting FDA/Medicare candidacy criteria in the right ear.      We discussed FDA and Medicare candidacy guidelines and that Harriet is meeting criteria in the right ear. Other items discussed included:    -Cochlear implant systems including the internal and external components; how cochlear implants work and function differently than hearing aids and the differences between acoustic and electric hearing.     -The cochlear implantation process including pre-surgical visits and extensive follow-up programming appointments and aural rehabilitation.     -Realistic expectations. Relearning to hear will be a slow process as sound is different from acoustic hearing; time, practice, and programming is needed to optimize sound quality and speech understanding; hearing may never be ideal or as expected; and phone use, music appreciation; and understanding in background noise may be limited. Discussed that patient should try to remove her left hearing aid as much as possible during the first 3 months of right CI use, to help with adjustment to right CI.  Patient had some concerns about how she would communicate at work and we discussed that she could wear the left hearing aid at  work but leave it off and only turn it on as needed.      -Risk factors:   Internal device failure; damage to the vestibular system and/or facial nerve; infection; possible loss of residual hearing on the implanted ear (although that should not affect performance outcomes); and all other risks reviewed on the Pre-Cochlear Implant Counseling handout.     Through patient interview during the consultation process this patient demonstrated the cognitive ability to use auditory clues and a willingness to undergo an extended program of rehabilitation.     We briefly discussed the 3 available cochlear implant manufacturers.  Literature was provided for patient to review at home.  Her current hearing aid for the left ear is 6-7 years old so she may wish to consider a right cochlear implant that has a compatible left hearing aid (Cochlear/Resound or Advanced Bionics/Phonak).  Discussed that trying a new contralateral hearing aid isn't typically advised until after patient has had cochlear implant for 3 months.  Additionally, she has an android phone which should be considered when selecting  as she would like to use direct streaming.      PLAN: Harriet is an audiological candidate for cochlear implantation in the right ear.  It is recommended that Harriet return to complete the CT scan and visit with Mariel Duran MD, cochlear implant surgeon, as scheduled on 12/7/23.  Patient reports that after that time she will decide if she is interested in proceeding.  If she determines that she is interested, a device selection appointment can be scheduled in Audiology.  Otherwise, if she settles on a particular 's device, she could instead work directly with them to complete the order form rather than returning for additional clinic visit.  Porsche Busby, implant coordinator, will follow up with the patient after her 12/7/23 meeting with Dr. Duran to determine next steps if patient plans to proceed with right  cochlear implant.  The patient expressed understanding and agreement with this plan.    Jian Landrum, CCC-A, [facility]  Licensed Audiologist  [place] #[id number]    Enclosure: audiogram    Cc Mariel Duran MD

## 2023-11-14 NOTE — PROGRESS NOTES
Post op day #1, status post cataract surgery, right eye  11/13/23      HPI:  Harriet Lance is here for above.  No complaints.  Denies eye pain.  Vision improved right eye, feels left eye is blurry.  Slight foreign body sensation in the right eye resolved with Zatidor x 1 yesterday. Using drops from surgery.  Would like to improve vision left eye with cataract surgery.      Assessment/Plan:  (Z96.1) Pseudophakia of right eye   Comment:  Postoperative day #1, good post-operative appearance.  Wounds water-tight and IOP reasonable.    Plan:   Operative eye:    Prednisolone (white cap, milky drop) 4 times daily   Ketorolac (gray cap) 4 times daily  Patient received intracameral Moxifloxacin in surgery      Instructions for patient reviewed:  Eye protection at all times and eye shield at night for 1 week.  Limited activities with no exercise or heavy lifting for 1 week.  Instructed patient to contact immediately for decreasing vision, eye pain, increased redness, new floaters or flashes of light, or other concerning symptoms.   Mixed type age-related cataract, left eye   Comment:   The patient was advised that the current significant symptoms of blurred vision and/or glare were primarily secondary to cataracts.  The risks, benefits, and alternatives to cataract surgery with intraocular lens implant were discussed at length, and informed consent was obtained.  The patient will schedule cataract surgery in the near future.  Pre-operative measurements were taken with the IOL Master to plan appropriate lens power and lens implant options were discussed including monofocal versus multifocal, toric for astigmatism, monovision, and refractive aim. The patient was advised of the necessity for a pre-operative physical with their primary physician.  The patient was also asked to arrange to have someone at home when returning from surgery.    Plan:  Phacoemulsification with intraocular lens implant both eyes       Surgical  plan:  Best corrected visual acuity today is 20/40  2  +NSC    1+ACC    Special equipment/needs:    Anesthesia: MAC/Topical  Able to lie flat:  Yes  Dilation: moderate  5.5mm  Alpha blocker/Flomax: No  Malyugen/Iris expansion: Malyugin possibly  Anticoagulants: No  ASA  Guttata: No   Diabetes: No  Pseudoexfoliation: No pseudoexfoliation  Trauma: No  Trypan Blue: No  Refractive goal: plano  Cylinder:  <1D  Written instructions given and questions answered.  Followup:  No follow-ups on file.        Complete documentation of historical and exam elements from today's encounter can be found in the full encounter summary report (not reduplicated in this progress note). I personally obtained the chief complaint(s) and history of present illness.  I have confirmed and edited as necessary the CC, HPI, PMH/PSH, social history, FMH, ROS, and exam/neuro findings as obtained by the technician or others. I have examined this patient myself and I personally viewed the image(s) and studies listed above and the documentation reflects my findings and interpretation.  I formulated and edited as necessary the assessment and plan and discussed the findings and management plan with the patient and family.     Balbina Aguilar MD

## 2023-11-17 ENCOUNTER — ANESTHESIA EVENT (OUTPATIENT)
Dept: SURGERY | Facility: AMBULATORY SURGERY CENTER | Age: 78
End: 2023-11-17
Payer: COMMERCIAL

## 2023-11-20 ENCOUNTER — HOSPITAL ENCOUNTER (OUTPATIENT)
Facility: AMBULATORY SURGERY CENTER | Age: 78
Discharge: HOME | End: 2023-11-20
Attending: OPHTHALMOLOGY
Payer: COMMERCIAL

## 2023-11-20 ENCOUNTER — ANESTHESIA (OUTPATIENT)
Dept: SURGERY | Facility: AMBULATORY SURGERY CENTER | Age: 78
End: 2023-11-20
Payer: COMMERCIAL

## 2023-11-20 VITALS
HEART RATE: 54 BPM | HEIGHT: 65 IN | BODY MASS INDEX: 30.99 KG/M2 | SYSTOLIC BLOOD PRESSURE: 115 MMHG | TEMPERATURE: 97.3 F | DIASTOLIC BLOOD PRESSURE: 54 MMHG | WEIGHT: 186 LBS | OXYGEN SATURATION: 99 % | RESPIRATION RATE: 16 BRPM

## 2023-11-20 DIAGNOSIS — H25.813 MIXED TYPE AGE-RELATED CATARACT, BOTH EYES: Primary | ICD-10-CM

## 2023-11-20 DIAGNOSIS — Z96.1 PSEUDOPHAKIA: Primary | ICD-10-CM

## 2023-11-20 PROCEDURE — 66984 XCAPSL CTRC RMVL W/O ECP: CPT | Mod: LT

## 2023-11-20 PROCEDURE — 66984 XCAPSL CTRC RMVL W/O ECP: CPT | Mod: 79 | Performed by: OPHTHALMOLOGY

## 2023-11-20 DEVICE — LENS CC60WF 20.0 CLAREON UV ASPHERIC BICONVEX IOL: Type: IMPLANTABLE DEVICE | Site: EYE | Status: FUNCTIONAL

## 2023-11-20 RX ORDER — BALANCED SALT SOLUTION 6.4; .75; .48; .3; 3.9; 1.7 MG/ML; MG/ML; MG/ML; MG/ML; MG/ML; MG/ML
SOLUTION OPHTHALMIC PRN
Status: DISCONTINUED | OUTPATIENT
Start: 2023-11-20 | End: 2023-11-20 | Stop reason: HOSPADM

## 2023-11-20 RX ORDER — MEPERIDINE HYDROCHLORIDE 25 MG/ML
12.5 INJECTION INTRAMUSCULAR; INTRAVENOUS; SUBCUTANEOUS EVERY 5 MIN PRN
Status: DISCONTINUED | OUTPATIENT
Start: 2023-11-20 | End: 2023-11-20 | Stop reason: HOSPADM

## 2023-11-20 RX ORDER — ONDANSETRON 4 MG/1
4 TABLET, ORALLY DISINTEGRATING ORAL EVERY 30 MIN PRN
Status: DISCONTINUED | OUTPATIENT
Start: 2023-11-20 | End: 2023-11-20 | Stop reason: HOSPADM

## 2023-11-20 RX ORDER — SODIUM CHLORIDE, SODIUM LACTATE, POTASSIUM CHLORIDE, CALCIUM CHLORIDE 600; 310; 30; 20 MG/100ML; MG/100ML; MG/100ML; MG/100ML
INJECTION, SOLUTION INTRAVENOUS CONTINUOUS
Status: DISCONTINUED | OUTPATIENT
Start: 2023-11-20 | End: 2023-11-20 | Stop reason: HOSPADM

## 2023-11-20 RX ORDER — ONDANSETRON 2 MG/ML
4 INJECTION INTRAMUSCULAR; INTRAVENOUS EVERY 30 MIN PRN
Status: DISCONTINUED | OUTPATIENT
Start: 2023-11-20 | End: 2023-11-21 | Stop reason: HOSPADM

## 2023-11-20 RX ORDER — OXYCODONE HYDROCHLORIDE 5 MG/1
5 TABLET ORAL
Status: DISCONTINUED | OUTPATIENT
Start: 2023-11-20 | End: 2023-11-21 | Stop reason: HOSPADM

## 2023-11-20 RX ORDER — DEXAMETHASONE SODIUM PHOSPHATE 10 MG/ML
4 INJECTION, SOLUTION INTRAMUSCULAR; INTRAVENOUS
Status: DISCONTINUED | OUTPATIENT
Start: 2023-11-20 | End: 2023-11-20 | Stop reason: HOSPADM

## 2023-11-20 RX ORDER — MOXIFLOXACIN IN NACL,ISO-OS/PF 0.3MG/0.3
SYRINGE (ML) INTRAOCULAR PRN
Status: DISCONTINUED | OUTPATIENT
Start: 2023-11-20 | End: 2023-11-20 | Stop reason: HOSPADM

## 2023-11-20 RX ORDER — KETOROLAC TROMETHAMINE 4 MG/ML
SOLUTION/ DROPS OPHTHALMIC
Qty: 5 ML | Refills: 1 | Status: SHIPPED | OUTPATIENT
Start: 2023-11-20 | End: 2024-09-05

## 2023-11-20 RX ORDER — LABETALOL HYDROCHLORIDE 5 MG/ML
10 INJECTION, SOLUTION INTRAVENOUS
Status: DISCONTINUED | OUTPATIENT
Start: 2023-11-20 | End: 2023-11-20 | Stop reason: HOSPADM

## 2023-11-20 RX ORDER — FENTANYL CITRATE 50 UG/ML
50 INJECTION, SOLUTION INTRAMUSCULAR; INTRAVENOUS EVERY 5 MIN PRN
Status: DISCONTINUED | OUTPATIENT
Start: 2023-11-20 | End: 2023-11-20 | Stop reason: HOSPADM

## 2023-11-20 RX ORDER — ACETAMINOPHEN 325 MG/1
975 TABLET ORAL ONCE
Status: COMPLETED | OUTPATIENT
Start: 2023-11-20 | End: 2023-11-20

## 2023-11-20 RX ORDER — CYCLOPENTOLAT/TROPIC/PHENYLEPH 1%-1%-2.5%
1 DROPS (EA) OPHTHALMIC (EYE)
Status: COMPLETED | OUTPATIENT
Start: 2023-11-20 | End: 2023-11-20

## 2023-11-20 RX ORDER — HYDROMORPHONE HYDROCHLORIDE 1 MG/ML
0.2 INJECTION, SOLUTION INTRAMUSCULAR; INTRAVENOUS; SUBCUTANEOUS EVERY 5 MIN PRN
Status: DISCONTINUED | OUTPATIENT
Start: 2023-11-20 | End: 2023-11-20 | Stop reason: HOSPADM

## 2023-11-20 RX ORDER — FENTANYL CITRATE 50 UG/ML
25 INJECTION, SOLUTION INTRAMUSCULAR; INTRAVENOUS EVERY 5 MIN PRN
Status: DISCONTINUED | OUTPATIENT
Start: 2023-11-20 | End: 2023-11-20 | Stop reason: HOSPADM

## 2023-11-20 RX ORDER — ALBUTEROL SULFATE 0.83 MG/ML
2.5 SOLUTION RESPIRATORY (INHALATION) EVERY 4 HOURS PRN
Status: DISCONTINUED | OUTPATIENT
Start: 2023-11-20 | End: 2023-11-20 | Stop reason: HOSPADM

## 2023-11-20 RX ORDER — DIAZEPAM 10 MG/2ML
2.5 INJECTION, SOLUTION INTRAMUSCULAR; INTRAVENOUS
Status: DISCONTINUED | OUTPATIENT
Start: 2023-11-20 | End: 2023-11-20 | Stop reason: HOSPADM

## 2023-11-20 RX ORDER — FENTANYL CITRATE 50 UG/ML
25 INJECTION, SOLUTION INTRAMUSCULAR; INTRAVENOUS
Status: DISCONTINUED | OUTPATIENT
Start: 2023-11-20 | End: 2023-11-21 | Stop reason: HOSPADM

## 2023-11-20 RX ORDER — ONDANSETRON 4 MG/1
4 TABLET, ORALLY DISINTEGRATING ORAL EVERY 30 MIN PRN
Status: DISCONTINUED | OUTPATIENT
Start: 2023-11-20 | End: 2023-11-21 | Stop reason: HOSPADM

## 2023-11-20 RX ORDER — HYDROMORPHONE HYDROCHLORIDE 1 MG/ML
0.4 INJECTION, SOLUTION INTRAMUSCULAR; INTRAVENOUS; SUBCUTANEOUS EVERY 5 MIN PRN
Status: DISCONTINUED | OUTPATIENT
Start: 2023-11-20 | End: 2023-11-20 | Stop reason: HOSPADM

## 2023-11-20 RX ORDER — HYDROXYZINE HYDROCHLORIDE 10 MG/1
10 TABLET, FILM COATED ORAL EVERY 6 HOURS PRN
Status: DISCONTINUED | OUTPATIENT
Start: 2023-11-20 | End: 2023-11-20 | Stop reason: HOSPADM

## 2023-11-20 RX ORDER — PROPARACAINE HYDROCHLORIDE 5 MG/ML
1 SOLUTION/ DROPS OPHTHALMIC ONCE
Status: DISCONTINUED | OUTPATIENT
Start: 2023-11-20 | End: 2023-11-20 | Stop reason: HOSPADM

## 2023-11-20 RX ORDER — MOXIFLOXACIN 5 MG/ML
1 SOLUTION/ DROPS OPHTHALMIC 3 TIMES DAILY
Status: COMPLETED | OUTPATIENT
Start: 2023-11-20 | End: 2023-11-20

## 2023-11-20 RX ORDER — PROPARACAINE HYDROCHLORIDE 5 MG/ML
1 SOLUTION/ DROPS OPHTHALMIC ONCE
Status: COMPLETED | OUTPATIENT
Start: 2023-11-20 | End: 2023-11-20

## 2023-11-20 RX ORDER — LIDOCAINE HYDROCHLORIDE 10 MG/ML
INJECTION, SOLUTION EPIDURAL; INFILTRATION; INTRACAUDAL; PERINEURAL PRN
Status: DISCONTINUED | OUTPATIENT
Start: 2023-11-20 | End: 2023-11-20 | Stop reason: HOSPADM

## 2023-11-20 RX ORDER — PREDNISOLONE ACETATE 1 %
SUSPENSION, DROPS(FINAL DOSAGE FORM)(ML) OPHTHALMIC (EYE) PRN
Status: DISCONTINUED | OUTPATIENT
Start: 2023-11-20 | End: 2023-11-20 | Stop reason: HOSPADM

## 2023-11-20 RX ORDER — LIDOCAINE 40 MG/G
CREAM TOPICAL
Status: DISCONTINUED | OUTPATIENT
Start: 2023-11-20 | End: 2023-11-20 | Stop reason: HOSPADM

## 2023-11-20 RX ORDER — PREDNISOLONE ACETATE 10 MG/ML
SUSPENSION/ DROPS OPHTHALMIC
Qty: 5 ML | Refills: 1 | Status: SHIPPED | OUTPATIENT
Start: 2023-11-20 | End: 2024-09-05

## 2023-11-20 RX ORDER — OXYCODONE HYDROCHLORIDE 5 MG/1
10 TABLET ORAL
Status: DISCONTINUED | OUTPATIENT
Start: 2023-11-20 | End: 2023-11-21 | Stop reason: HOSPADM

## 2023-11-20 RX ORDER — SODIUM CHLORIDE, SODIUM LACTATE, POTASSIUM CHLORIDE, CALCIUM CHLORIDE 600; 310; 30; 20 MG/100ML; MG/100ML; MG/100ML; MG/100ML
INJECTION, SOLUTION INTRAVENOUS CONTINUOUS
Status: DISCONTINUED | OUTPATIENT
Start: 2023-11-20 | End: 2023-11-21 | Stop reason: HOSPADM

## 2023-11-20 RX ORDER — TETRACAINE HYDROCHLORIDE 5 MG/ML
SOLUTION OPHTHALMIC PRN
Status: DISCONTINUED | OUTPATIENT
Start: 2023-11-20 | End: 2023-11-20 | Stop reason: HOSPADM

## 2023-11-20 RX ORDER — ONDANSETRON 2 MG/ML
4 INJECTION INTRAMUSCULAR; INTRAVENOUS EVERY 30 MIN PRN
Status: DISCONTINUED | OUTPATIENT
Start: 2023-11-20 | End: 2023-11-20 | Stop reason: HOSPADM

## 2023-11-20 RX ORDER — HALOPERIDOL 5 MG/ML
1 INJECTION INTRAMUSCULAR
Status: DISCONTINUED | OUTPATIENT
Start: 2023-11-20 | End: 2023-11-20 | Stop reason: HOSPADM

## 2023-11-20 RX ORDER — KETOROLAC TROMETHAMINE 30 MG/ML
15 INJECTION, SOLUTION INTRAMUSCULAR; INTRAVENOUS
Status: DISCONTINUED | OUTPATIENT
Start: 2023-11-20 | End: 2023-11-20 | Stop reason: HOSPADM

## 2023-11-20 RX ADMIN — Medication 1 DROP: at 11:52

## 2023-11-20 RX ADMIN — MOXIFLOXACIN 1 DROP: 5 SOLUTION/ DROPS OPHTHALMIC at 12:30

## 2023-11-20 RX ADMIN — PROPARACAINE HYDROCHLORIDE 1 DROP: 5 SOLUTION/ DROPS OPHTHALMIC at 11:49

## 2023-11-20 RX ADMIN — Medication 1 DROP: at 11:55

## 2023-11-20 RX ADMIN — Medication 1 DROP: at 11:47

## 2023-11-20 RX ADMIN — SODIUM CHLORIDE, SODIUM LACTATE, POTASSIUM CHLORIDE, CALCIUM CHLORIDE: 600; 310; 30; 20 INJECTION, SOLUTION INTRAVENOUS at 12:52

## 2023-11-20 NOTE — OP NOTE
Pre-operative Diagnosis: Visually significant cataract, Left eye    Post-operative Diagnosis:  Pseudophakia, Left     Operative Procedure:  Phacoemulsification with intraocular lens implantation, Left eye     Surgeon(s):  Balbina Aguilar MD    Anesthesia:   Topical/MAC  Findings:  No unusual findings   Blood Loss:    Minimal  Implants:   Implant Name Type Inv. Item Serial No.  Lot No. LRB No. Used Action   LENS [id number] 20.0 CL[employer] UV ASPHERIC BICONVEX IOL - [id number] Lens/Eye Implant LENS [id number] 20.0 [employer] UV ASPHERIC BICONVEX IOL [id number] [employer]  Left 1 Implanted     Specimens:  None   Complications:  none   Condition:  stable    Indications: The patient Harriet Lance presented to the eye clinic with decreased vision secondary to cataract in the Left eye. The risks, benefits and alternatives to cataract extraction were discussed. The patient elected to proceed. All questions were answered to the patient's satisfaction.    Description of Procedure:   Prior to the procedure, appropriate cardiac and respiratory monitors were applied to the patient. The patient was brought to the operating room where a drop of topical tetracaine was given followed by lidocaine gel followed by povidone iodine.  A surgical pause was carried out to identify with all members of the surgical team the correct surgical site.  With adequate anesthesia, the Left eye was prepped and draped in the usual sterile fashion. A lid speculum was placed, and the operating microscope was rotated into position.  A paracentesis was created to the left of the planned temporal incision.  Through this limbal paracentesis, the anterior chamber was filled with preservative-free lidocaine followed by dispersive viscoelastic.  A temporal wound was created at the limbus using a 2.5 mm keratome blade.  A capsulorrhexis was initiated using a bent 25-gauge needle and was completed in continuous and circular fashion using the  capsulorrhexis forceps. The lens nucleus was hydrodissected using balanced salt solution.  The lens nucleus was rotated and removed using phacoemulsification.  Residual cortical material was removed using irrigation-aspiration.  The capsular bag was reinflated to its maximal extent with cohesive viscoelastic.  An intraocular lens implant of the above listed power was inserted into the capsular bag.  The lens power was reviewed using the preoperative intraocular lens power measurements to confirm that the correct lens was used for the desired post-operative refractive state.  The residual viscoelastic was removed in its entirety, the wound were hydrated and found to be self-sealing.  A dose of 0.1mg of intracameral moxifloxacin was administered through the paracentesis.  Tactile pressure was confirmed to be in a normal range.  The lid speculum was removed and a drop of prednisolone acetate 1% and ketorolac were given before a shield was applied.  The patient tolerated the procedure well, and there were no complications.    Plan: The patient will be discharged to home and will follow up tomorrow morning in the eye clinic.

## 2023-11-20 NOTE — ANESTHESIA CARE TRANSFER NOTE
Patient: Harriet Lance    Procedure: Procedure(s):  LEFT EYE PHACOEMULSIFICATION, CATARACT, WITH STANDARD INTRAOCULAR LENS IMPLANT INSERTION       Diagnosis: Mixed type age-related cataract, both eyes [H25.813]  Diagnosis Additional Information: No value filed.    Anesthesia Type:   No value filed.     Note:    Oropharynx: oropharynx clear of all foreign objects and spontaneously breathing  Level of Consciousness: awake  Oxygen Supplementation: room air    Independent Airway: airway patency satisfactory and stable  Dentition: dentition unchanged  Vital Signs Stable: post-procedure vital signs reviewed and stable  Report to RN Given: handoff report given  Patient transferred to: Phase II    Handoff Report: Identifed the Patient, Identified the Reponsible Provider, Reviewed the pertinent medical history, Discussed the surgical course, Reviewed Intra-OP anesthesia mangement and issues during anesthesia, Set expectations for post-procedure period and Allowed opportunity for questions and acknowledgement of understanding      Vitals:  Vitals Value Taken Time   /59    Temp 36.3  C (97.3  F) 11/20/23 1322   Pulse 55 11/20/23 1322   Resp 16 11/20/23 1322   SpO2 100 % 11/20/23 1322       Electronically Signed By: ROBERTO Deras CRNA  November 20, 2023  1:26 PM

## 2023-11-20 NOTE — ANESTHESIA PREPROCEDURE EVALUATION
Anesthesia Pre-Procedure Evaluation    Patient: Harriet Lance   MRN: [id number] : 1945        Procedure : Procedure(s):  LEFT EYE PHACOEMULSIFICATION, CATARACT, WITH STANDARD INTRAOCULAR LENS IMPLANT INSERTION          Past Medical History:   Diagnosis Date    Allergic rhinitis, cause unspecified     Chronic renal disease     normal creatinine since     Colon cancer (H)     chemotherapy    Diffuse cystic mastopathy     calcifications on the right breast - followed closely    Hypertension     Personal history of malignant neoplasm of large intestine     had chemotherapy after colon resection -     Unspecified glaucoma(365.9)     Early - followed by  Opthamologist - both eyes      Past Surgical History:   Procedure Laterality Date    AS TOTAL HIP ARTHROPLASTY Right 2017    done at [facility]    BIOPSY  1975    Age 30    COLONOSCOPY N/A 10/10/2022    Procedure: COLONOSCOPY, WITH POLYPECTOMY;  Surgeon: Dianna La MD;  [place] [facility] [place]N SURGERY      ORTHOPEDIC SURGERY  ,2018    PHACOEMULSIFICATION WITH STANDARD INTRAOCULAR LENS IMPLANT Right 2023    Procedure: RIGHT EYE PHACOEMULSIFICATION, CATARACT, WITH STANDARD INTRAOCULAR LENS IMPLANT INSERTION;  Surgeon: Balbina Aguilar MD;  [place] [facility] [place]    [facility] REMOVAL COLON/ILEOSTOMY      8 inches of colon removed - not clear what section      Allergies   Allergen Reactions    Hay Fever & [A.R.M.]     Amoxicillin Rash     Onset of raised red rash on torso and arms. Face blotchy and throat scratchy.  Noted on 10/4/16    Codeine Rash     Unclear if this is a reaction to amoxicillin or codeine.      Social History     Tobacco Use    Smoking status: Never    Smokeless tobacco: Never   Substance Use Topics    Alcohol use: Never     Comment: rarely      Wt Readings from Last 1 Encounters:   23 84.4 kg (186 lb)              OUTSIDE LABS:  CBC:   Lab Results   Component Value Date    WBC 7.1 2023  "   WBC 6.6 01/21/2020    HGB 13.5 06/16/2023    HGB 14.0 01/21/2020    HCT 40.5 06/16/2023    HCT 42.9 01/21/2020     06/16/2023     01/21/2020     BMP:   Lab Results   Component Value Date     06/16/2023     05/04/2022    POTASSIUM 3.7 06/16/2023    POTASSIUM 3.5 05/04/2022    CHLORIDE 103 06/16/2023    CHLORIDE 107 05/04/2022    CO2 25 06/16/2023    CO2 29 05/04/2022    BUN 14.5 06/16/2023    BUN 14 05/04/2022    CR 0.95 06/16/2023    CR 0.96 05/04/2022     (H) 06/16/2023     (H) 05/04/2022     COAGS:   Lab Results   Component Value Date    INR 1.92 (H) 07/02/2018     POC: No results found for: \"BGM\", \"HCG\", \"HCGS\"  HEPATIC:   Lab Results   Component Value Date    ALBUMIN 4.4 06/16/2023    PROTTOTAL 8.3 06/16/2023    ALT 25 06/16/2023    AST 31 06/16/2023    ALKPHOS 81 06/16/2023    BILITOTAL 0.3 06/16/2023     OTHER:   Lab Results   Component Value Date    A1C 5.8 (H) 01/26/2021    STELLA 9.7 06/16/2023    TSH 2.94 06/16/2023    CRP <2.9 04/10/2019    SED 17 04/10/2019       Anesthesia Plan    ASA Status:  3       Anesthesia Type: MAC.   Induction: Intravenous.   Maintenance: TIVA.        Consents    Anesthesia Plan(s) and associated risks, benefits, and realistic alternatives discussed. Questions answered and patient/representative(s) expressed understanding.     - Discussed:     - Discussed with:  Patient            Postoperative Care    Pain management: Multi-modal analgesia.   PONV prophylaxis: Ondansetron (or other 5HT-3), Background Propofol Infusion     Comments:                Leslie Ohara MD  "

## 2023-11-20 NOTE — DISCHARGE INSTRUCTIONS
"[facility]  Home Care Following Anesthesia  For 24 hours after surgery:  Get plenty of rest.  A responsible adult must stay with you for at least 24 hours after you leave the surgery center.  Do not drive or use heavy equipment.  If you have weakness or tingling, don't drive or use heavy equipment until this feeling goes away.   Do not drink alcohol.   Avoid strenuous or risky activities.  Ask for help when climbing stairs.  You may feel lightheaded.  IF so, sit for a few minutes before standing.  Have someone help you get up.   If you have nausea (feel sick to your stomach): Drink only clear liquids such as apple juice, ginger ale, broth or 7-Up.  Rest may also help.  Be sure to drink enough fluids.  Move to a regular diet as you feel able.   You may have a slight fever.  Call the doctor if your fever is over 100 F (37.7 C) (taken under the tongue) or lasts longer than 24 hours.  You may have a dry mouth, a sore throat, muscle aches or trouble sleeping. These should go away after 24 hours.  Do not make important or legal decisions.   It is recommended to avoid smoking.        Today you received a Marcaine or bupivacaine block to numb the nerves near your surgery site.  This is a block using local anesthetic or \"numbing\" medication injected around the nerves to anesthetize or \"numb\" the area supplied by those nerves.  This block is injected into the muscle layer near your surgical site.  The medication may numb the location where you had surgery for 6-18 hours, but may last up to 24 hours.  If your surgical site is an arm or leg you should be careful with your affected limb, since it is possible to injure your limb without being aware of it due to the numbing.  Until full feeling returns, you should guard against bumping or hitting your limb, and avoid extreme hot or cold temperatures on the skin.  As the block wears off, the feeling will return as a tingling or prickly " sensation near your surgical site.  You will experience more discomfort from your incision as the feeling returns.  You may want to take a pain pill (a narcotic or Tylenol if this was prescribed by your surgeon) when you start to experience mild pain before the pain beccomes more severe.  If your pain medications do not control your pain you should notifiy your surgeon.    Tips for taking pain medications  To get the best pain relief possible, remember these points:  Take pain medications as directed, before pain becomes severe.  Pain medication can upset your stomach: taking it with food may help.  Constipation is a common side effect of pain medication. Drink plenty of  fluids.  Eat foods high in fiber. Take a stool softener if recommended by your doctor or pharmacist.  Do not drink alcohol, drive or operate machinery while taking pain medications.  Ask about other ways to control pain, such as with heat, ice or relaxation.    Tylenol/Acetaminophen Consumption    If you feel your pain relief is insufficient, you may take Tylenol/Acetaminophen in addition to your narcotic pain medication.   Be careful not to exceed 4,000 mg of Tylenol/Acetaminophen in a 24 hour period from all sources.  If you are taking extra strength Tylenol/acetaminophen (500 mg), the maximum dose is 8 tablets in 24 hours.  If you are taking regular strength acetaminophen (325 mg), the maximum dose is 12 tablets in 24 hours.    Call a doctor for any of the following:  Signs of infection (fever, growing tenderness at the surgery site, a large amount of drainage or bleeding, severe pain, foul-smelling drainage, redness, swelling).  It has been over 8 to 10 hours since surgery and you are still not able to urinate (pass water).  Headache for over 24 hours.  Signs of Covid-19 infection (temperature over 100 degrees, shortness of breath, cough, loss of taste/smell, generalized body aches, persistent headache, chills, sore throat,  nausea/vomiting/diarrhea)  Your doctor is:  Dr. Balbina Aguilar, Ophthalmology: [phone]                  Or dial [phone] and ask for the resident on call for:  Ophthalmology  For emergency care, call the:  [facility]:  [phone] (TTY for hearing impaired: [phone])

## 2023-11-20 NOTE — ANESTHESIA POSTPROCEDURE EVALUATION
Patient: Harriet Lance    Procedure: Procedure(s):  LEFT EYE PHACOEMULSIFICATION, CATARACT, WITH STANDARD INTRAOCULAR LENS IMPLANT INSERTION       Anesthesia Type:  MAC    Note:  Disposition: Outpatient   Postop Pain Control: Uneventful            Sign Out: Well controlled pain   PONV: No   Neuro/Psych: Uneventful            Sign Out: Acceptable/Baseline neuro status   Airway/Respiratory: Uneventful            Sign Out: Acceptable/Baseline resp. status   CV/Hemodynamics: Uneventful            Sign Out: Acceptable CV status; No obvious hypovolemia; No obvious fluid overload   Other NRE: NONE   DID A NON-ROUTINE EVENT OCCUR? No           Last vitals:  Vitals Value Taken Time   /54 11/20/23 1335   Temp 36.3  C (97.3  F) 11/20/23 1335   Pulse 54 11/20/23 1335   Resp 16 11/20/23 1335   SpO2 99 % 11/20/23 1335       Electronically Signed By: Leslie Ohara MD  November 20, 2023  5:45 PM

## 2023-11-21 ENCOUNTER — MYC MEDICAL ADVICE (OUTPATIENT)
Dept: FAMILY MEDICINE | Facility: CLINIC | Age: 78
End: 2023-11-21

## 2023-11-21 ENCOUNTER — OFFICE VISIT (OUTPATIENT)
Dept: OPHTHALMOLOGY | Facility: CLINIC | Age: 78
End: 2023-11-21
Payer: COMMERCIAL

## 2023-11-21 DIAGNOSIS — Z96.1 PSEUDOPHAKIA, BOTH EYES: Primary | ICD-10-CM

## 2023-11-21 PROCEDURE — 99024 POSTOP FOLLOW-UP VISIT: CPT | Performed by: OPHTHALMOLOGY

## 2023-11-21 ASSESSMENT — TONOMETRY
OS_IOP_MMHG: 17
OD_IOP_MMHG: 10
IOP_METHOD: ICARE

## 2023-11-21 ASSESSMENT — EXTERNAL EXAM - RIGHT EYE: OD_EXAM: NORMAL

## 2023-11-21 ASSESSMENT — REFRACTION_WEARINGRX
OD_ADD: +3.00
OS_SPHERE: -1.75
OD_AXIS: 012
OS_AXIS: 015
OS_ADD: +3.00
SPECS_TYPE: PAL
OS_CYLINDER: +1.00
OD_CYLINDER: +0.75
OD_SPHERE: -1.25

## 2023-11-21 ASSESSMENT — VISUAL ACUITY
OD_SC+: +1
OD_SC: 20/25
METHOD: SNELLEN - LINEAR
OS_SC+: +1
OS_SC: 20/30

## 2023-11-21 ASSESSMENT — SLIT LAMP EXAM - LIDS
COMMENTS: TR MGD
COMMENTS: TR MGD

## 2023-11-21 ASSESSMENT — PACHYMETRY
OS_CT(UM): 552
OD_CT(UM): 553

## 2023-11-21 ASSESSMENT — EXTERNAL EXAM - LEFT EYE: OS_EXAM: NORMAL

## 2023-11-21 ASSESSMENT — CUP TO DISC RATIO: OS_RATIO: 0.35

## 2023-11-21 NOTE — NURSING NOTE
Chief Complaints and History of Present Illnesses   Patient presents with    Post Op (Ophthalmology) Left Eye     POD1 s/p CE/IOL left eye, POW1 right eye      Chief Complaint(s) and History of Present Illness(es)       Post Op (Ophthalmology) Left Eye              Comments: POD1 s/p CE/IOL left eye, POW1 right eye               Comments    Pt states no eye pain or discomfort.   She feels the vision is good both eyes.   No complaints both eyes.     Sunny Joel 8:56 AM November 21, 2023

## 2023-11-21 NOTE — PROGRESS NOTES
CC: post op day #1, status post cataract surgery, left eye 11/20/23  Post-operative week 1 status-post cataract extraction right eye 11/13/23    HPI:  Harriet Lance is a 78 year old female here for above.    HPI       Post Op (Ophthalmology) Left Eye     Additional comments: POD1 s/p CE/IOL left eye, POW1 right eye              Comments    Pt states no eye pain or discomfort.   She feels the vision is good both eyes.   No complaints both eyes.     Sunny Joel 8:56 AM November 21, 2023            Last edited by Sunny Joel on 11/21/2023  8:56 AM.             Assessment/Plan:  1. Pseudophakia, both eyes - Both Eyes       Comment:  Postoperative day #1, good post-operative appearance.  Wounds water-tight and IOP reasonable.      Plan:   Operative eye:    Prednisolone (white cap, milky drop) 4 times daily left eye, twice a day right eye   Ketorolac (gray cap) 4 times daily twice a day right eye   Patient received intracameral Moxifloxacin in surgery  Taper per prescription instructions each week until off    Instructions for patient reviewed:  Eye protection at all times and eye shield at night for 1 week.  Limited activities with no exercise or heavy lifting for 1 week.  Instructed patient to contact immediately for decreasing vision, eye pain, increased redness, new floaters or flashes of light, or other concerning symptoms.  Return in about 1 week     Return in about 1 month (around 12/21/2023) for post-op cataract sx, POM 1 OU .       Complete documentation of historical and exam elements from today's encounter can be found in the full encounter summary report (not reduplicated in this progress note). I personally obtained the chief complaint(s) and history of present illness.  I have confirmed and edited as necessary the CC, HPI, PMH/PSH, social history, FMH, ROS, and exam/neuro findings as obtained by the technician or others. I have examined this patient myself and I personally viewed the image(s) and studies listed  above and the documentation reflects my findings and interpretation.  I formulated and edited as necessary the assessment and plan and discussed the findings and management plan with the patient and family.     Balbina Aguilar MD

## 2023-11-28 ENCOUNTER — OFFICE VISIT (OUTPATIENT)
Dept: AUDIOLOGY | Facility: CLINIC | Age: 78
End: 2023-11-28
Payer: COMMERCIAL

## 2023-11-28 ENCOUNTER — PRE VISIT (OUTPATIENT)
Dept: AUDIOLOGY | Facility: CLINIC | Age: 78
End: 2023-11-28

## 2023-11-28 DIAGNOSIS — H90.3 SENSORINEURAL HEARING LOSS (SNHL) OF BOTH EARS: Primary | ICD-10-CM

## 2023-11-28 PROCEDURE — 92626 EVAL AUD FUNCJ 1ST HOUR: CPT | Performed by: AUDIOLOGIST-HEARING AID FITTER

## 2023-11-28 PROCEDURE — 92588 EVOKED AUDITORY TST COMPLETE: CPT | Performed by: AUDIOLOGIST-HEARING AID FITTER

## 2023-12-05 ENCOUNTER — MYC MEDICAL ADVICE (OUTPATIENT)
Dept: AUDIOLOGY | Facility: CLINIC | Age: 78
End: 2023-12-05
Payer: COMMERCIAL

## 2023-12-06 NOTE — TELEPHONE ENCOUNTER
[facility] for [place] to offer appt date for CI device selection with Leesa Araujo. Will message ENT nurses with details as well.    -Porsche ABDI 16/6/23

## 2023-12-07 ENCOUNTER — ANCILLARY PROCEDURE (OUTPATIENT)
Dept: CT IMAGING | Facility: CLINIC | Age: 78
End: 2023-12-07
Attending: OTOLARYNGOLOGY
Payer: COMMERCIAL

## 2023-12-07 ENCOUNTER — OFFICE VISIT (OUTPATIENT)
Dept: OTOLARYNGOLOGY | Facility: CLINIC | Age: 78
End: 2023-12-07
Payer: COMMERCIAL

## 2023-12-07 DIAGNOSIS — H90.3 SENSORINEURAL HEARING LOSS (SNHL) OF BOTH EARS: ICD-10-CM

## 2023-12-07 DIAGNOSIS — H90.3 SENSORINEURAL HEARING LOSS (SNHL) OF BOTH EARS: Primary | ICD-10-CM

## 2023-12-07 PROCEDURE — 70480 CT ORBIT/EAR/FOSSA W/O DYE: CPT | Mod: GC | Performed by: RADIOLOGY

## 2023-12-07 PROCEDURE — 99213 OFFICE O/P EST LOW 20 MIN: CPT | Performed by: OTOLARYNGOLOGY

## 2023-12-07 ASSESSMENT — PAIN SCALES - GENERAL: PAINLEVEL: NO PAIN (0)

## 2023-12-07 NOTE — NURSING NOTE
Chief Complaint   Patient presents with    Consult     Cochlear implant        iLncoln Miranda LPN

## 2023-12-07 NOTE — PATIENT INSTRUCTIONS
You were seen in the ENT Clinic today by Dr. Duran. If you have any questions or concerns after your appointment, please contact us (see below)      2.   Please set up an appointment with audiology for a device selection.         How to Contact Us:  Send a Jinn message to your provider. Our team will respond to you via Jinn. Occasionally, we will need to call you to get further information.  For urgent matters (Monday-Friday), call the ENT Clinic: [phone] and speak with a call center team member - they will route your call appropriately.   If you'd like to speak directly with a nurse, please find our contact information below. We do our best to check voicemail frequently throughout the day, and will work to call you back within 1-2 days. For urgent matters, please use the general clinic phone numbers listed above.      Sonya MONTEIRO RN  ENT RN Care Coordinator  Direct: [phone]    Renee DAMON LPN  Direct: [phone]

## 2023-12-07 NOTE — LETTER
12/7/2023      RE: Harriet Lance  [address] [id number]       Harriet Lance is seen for discussion regarding possible right cochlear implantation. She was noted to have poor right speech discrimination on her audiogram and reports that her right ear used to be her better hearing ear 6 years ago but has become her worse hearing ear over the last few years. She had a brain MRI in 2020 which did not show any enhancing lesions in the IAC/CPA bilaterally. She underwent implant evaluation with audiology which showed that she is an implant candidate on the right. She has not yet decided if she would like to proceed.    Physical examination:  female in no acute distress.  Alert and answering questions appropriately. She is wearing bilateral hearing aids. HB 1/6 bilaterally.  Both ears examined. Ear canals are narrow bilaterally but without cerumen impaction, TMs intact, middle ears aerated.    Cochlear implant evaluation: independently reviewed   CNC Words Test:   Left ear aided: 68% words  Right ear aided: 16% words  Bilaterally aided: 64% words   [employer] Sentences Test:   Left ear aided: 83% in quiet, 47% with +10 dB signal to noise ratio (SNR)  Right ear aided: 15% in quiet, Did not test (DNT) with +10 dB SNR due to poor score in quiet   Bilaterally aided: 90% in quiet, 45% with +10 dB SNR    Imaging: CT temporal bones independently reviewed. Bilateral normal temporal bones with aerated mastoids and middle ears, intact otic capsules, facial nerves in their usual positions.    Assessment and plan:  Harriet Lance has right severe hearing loss with poor speech discrimination and left moderate sensorineural hearing loss that is so severe that she receives no benefit from a right hearing aid. A hearing aid trial has been conducted with no benefit. There are measurable auditory thresholds indicating a functioning cochlear nerve. The patient and the family are highly motivated to proceed with  implantation and postoperative rehabilitation. Through patient interview during the consultation process, this patient demonstrated the cognitive ability to use auditory clues and a willingness to undergo an extended program of rehabilitation. There is no evidence of middle ear disease. Imaging shows a patent cochlea and cochlear nerve. The devices discussed with the patient are all FDA approved devices. Risks and benefits of implantation were discussed. Risks include:  Expected loss of residual hearing, worsened tinnitus which may improve with activation of the device, dizziness, damage to the taste nerve, damage to the facial nerve, infection with need for explanation and reimplantation, device failure, and possible need for further surgery. It was also discussed that all implant recipients are at greater risk for meningitis and the need for pneumococcal vaccination. She is still deciding whether she'd like to proceed with right implantation. She [place] and she would like to see how she does with her family and also discuss it with them. She has a person she knows who recently received a cochlear implant who seemed very happy and a different person with it and she is going to talk to him as well. If she decides to proceed, she will contact us. She will schedule device selection with Audiology today.    Mariel Duran MD

## 2023-12-07 NOTE — PROGRESS NOTES
Harriet Lance is seen for discussion regarding possible right cochlear implantation. She was noted to have poor right speech discrimination on her audiogram and reports that her right ear used to be her better hearing ear 6 years ago but has become her worse hearing ear over the last few years. She had a brain MRI in 2020 which did not show any enhancing lesions in the IAC/CPA bilaterally. She underwent implant evaluation with audiology which showed that she is an implant candidate on the right. She has not yet decided if she would like to proceed.    Physical examination:  female in no acute distress.  Alert and answering questions appropriately. She is wearing bilateral hearing aids. HB 1/6 bilaterally.  Both ears examined. Ear canals are narrow bilaterally but without cerumen impaction, TMs intact, middle ears aerated.    Cochlear implant evaluation: independently reviewed   CNC Words Test:   Left ear aided: 68% words  Right ear aided: 16% words  Bilaterally aided: 64% words   [employer] Sentences Test:   Left ear aided: 83% in quiet, 47% with +10 dB signal to noise ratio (SNR)  Right ear aided: 15% in quiet, Did not test (DNT) with +10 dB SNR due to poor score in quiet   Bilaterally aided: 90% in quiet, 45% with +10 dB SNR    Imaging: CT temporal bones independently reviewed. Bilateral normal temporal bones with aerated mastoids and middle ears, intact otic capsules, facial nerves in their usual positions.    Assessment and plan:  Harriet Lance has right severe hearing loss with poor speech discrimination and left moderate sensorineural hearing loss that is so severe that she receives no benefit from a right hearing aid. A hearing aid trial has been conducted with no benefit. There are measurable auditory thresholds indicating a functioning cochlear nerve. The patient and the family are highly motivated to proceed with implantation and postoperative rehabilitation. Through patient interview during the  consultation process, this patient demonstrated the cognitive ability to use auditory clues and a willingness to undergo an extended program of rehabilitation. There is no evidence of middle ear disease. Imaging shows a patent cochlea and cochlear nerve. The devices discussed with the patient are all FDA approved devices. Risks and benefits of implantation were discussed. Risks include:  Expected loss of residual hearing, worsened tinnitus which may improve with activation of the device, dizziness, damage to the taste nerve, damage to the facial nerve, infection with need for explanation and reimplantation, device failure, and possible need for further surgery. It was also discussed that all implant recipients are at greater risk for meningitis and the need for pneumococcal vaccination. She is still deciding whether she'd like to proceed with right implantation. She is [place] and she would like to see how she does with her family and also discuss it with them. She has a person she knows who recently received a cochlear implant who seemed very happy and a different person with it and she is going to talk to him as well. If she decides to proceed, she will contact us. She will schedule device selection with Audiology today.

## 2023-12-19 ENCOUNTER — OFFICE VISIT (OUTPATIENT)
Dept: OPHTHALMOLOGY | Facility: CLINIC | Age: 78
End: 2023-12-19
Payer: COMMERCIAL

## 2023-12-19 DIAGNOSIS — Z96.1 PSEUDOPHAKIA, BOTH EYES: Primary | ICD-10-CM

## 2023-12-19 DIAGNOSIS — H40.003 GLAUCOMA SUSPECT OF BOTH EYES: ICD-10-CM

## 2023-12-19 DIAGNOSIS — H52.4 PRESBYOPIA OF BOTH EYES: ICD-10-CM

## 2023-12-19 PROCEDURE — 99024 POSTOP FOLLOW-UP VISIT: CPT | Performed by: OPHTHALMOLOGY

## 2023-12-19 ASSESSMENT — CONF VISUAL FIELD
OD_INFERIOR_NASAL_RESTRICTION: 0
OS_NORMAL: 1
OD_INFERIOR_TEMPORAL_RESTRICTION: 0
OD_NORMAL: 1
OD_SUPERIOR_NASAL_RESTRICTION: 0
OS_INFERIOR_NASAL_RESTRICTION: 0
OS_INFERIOR_TEMPORAL_RESTRICTION: 0
OS_SUPERIOR_TEMPORAL_RESTRICTION: 0
OD_SUPERIOR_TEMPORAL_RESTRICTION: 0
OS_SUPERIOR_NASAL_RESTRICTION: 0

## 2023-12-19 ASSESSMENT — CUP TO DISC RATIO
OS_RATIO: 0.45
OD_RATIO: 0.35

## 2023-12-19 ASSESSMENT — PACHYMETRY
OS_CT(UM): 552
OD_CT(UM): 553

## 2023-12-19 ASSESSMENT — VISUAL ACUITY
OD_SC: 20/20
OS_SC: 20/20
METHOD: SNELLEN - LINEAR
METHOD_MR: PATIENT DEFERS

## 2023-12-19 ASSESSMENT — TONOMETRY
OD_IOP_MMHG: 15
OS_IOP_MMHG: 16
IOP_METHOD: ICARE

## 2023-12-19 ASSESSMENT — EXTERNAL EXAM - RIGHT EYE: OD_EXAM: NORMAL

## 2023-12-19 ASSESSMENT — EXTERNAL EXAM - LEFT EYE: OS_EXAM: NORMAL

## 2023-12-19 ASSESSMENT — SLIT LAMP EXAM - LIDS
COMMENTS: TR MGD
COMMENTS: TR MGD

## 2023-12-19 NOTE — NURSING NOTE
Chief Complaints and History of Present Illnesses   Patient presents with    Follow Up For Surgery Of Eye     Chief Complaint(s) and History of Present Illness(es)       Follow Up For Surgery Of Eye              Laterality: both eyes              Comments    Patient denies any eye issues at this time. Patient not using any distance correction, just readers.  NICOLE Myers December 19, 2023 9:53 AM

## 2023-12-19 NOTE — PROGRESS NOTES
CC:  status post cataract surgery, left eye 11/20/23,  right eye 11/13/23    HPI:  Harriet Lance is a 78 year old female here for above.    HPI       Follow Up For Surgery Of Eye    In both eyes.             Comments    Patient denies any eye issues at this time. Patient not using any distance correction, just readers.  NICOLE Myers December 19, 2023 9:53 AM           Last edited by Cheli Lopez on 12/19/2023  9:53 AM.           PMH:  Hypertension, seasonal allergies  POH:  Glasses for reading only, cataract surgery 11/2023, glaucoma suspect, no surgery, no trauma  Oc Meds:  None (latan in [place] [facility] [place]  FH:  Denies any glaucoma, age related macular degeneration, or other known eye diseases     Assessment/Plan:  1. Pseudophakia, both eyes - Both Eyes    2. Presbyopia of both eyes - Both Eyes    3. Glaucoma suspect of both eyes - Both Eyes         (Z96.1) Pseudophakia of both eyes - Both Eyes  Presbyopia both eyes   Comment: great result after cataract extraction both eyes   Plan: continue over the counter readers   Call for new decreasing vision, eye pain, increased redness, new floaters or flashes of light, or other concerning symptoms.    (H40.003) Glaucoma suspect of both eyes  (primary encounter diagnosis)  Comment: based on cup to discontinue, was being followed at [facility] [place]c since at least 2005, was on Travatan in past but was taken off in past -- K pachy: 553/552   Tmax: 20/22    HVF: Fluct in 2015, 2016     CDR: 0.3/0.5    HRT/OCT: RNFL WNL      FHX of Glc:  No    Gonio: open      Intolerant to:      Asthma/COPD: No, on po BB  Steroid Use: No    Kidney Stones: No    Sulfa Allergy:No      IOP targets:L20s -- IOP good mid to low teens  Plan: OCT Optic Nerve RNFL Spectralis OU next year , low suspicion      Return in about 7 months (around 7/19/2024) for Comprehensive Exam, OCT nerve (RNFL) OU.       Complete documentation of historical and exam elements from today's encounter can  be found in the full encounter summary report (not reduplicated in this progress note). I personally obtained the chief complaint(s) and history of present illness.  I have confirmed and edited as necessary the CC, HPI, PMH/PSH, social history, FMH, ROS, and exam/neuro findings as obtained by the technician or others. I have examined this patient myself and I personally viewed the image(s) and studies listed above and the documentation reflects my findings and interpretation.  I formulated and edited as necessary the assessment and plan and discussed the findings and management plan with the patient and family.     Balbina Aguilar MD

## 2023-12-26 ENCOUNTER — PATIENT OUTREACH (OUTPATIENT)
Dept: CARE COORDINATION | Facility: CLINIC | Age: 78
End: 2023-12-26
Payer: COMMERCIAL

## 2024-01-05 ENCOUNTER — MYC REFILL (OUTPATIENT)
Dept: FAMILY MEDICINE | Facility: CLINIC | Age: 79
End: 2024-01-05
Payer: COMMERCIAL

## 2024-01-05 DIAGNOSIS — I10 HYPERTENSION GOAL BP (BLOOD PRESSURE) < 140/90: ICD-10-CM

## 2024-01-05 RX ORDER — METOPROLOL SUCCINATE 50 MG/1
TABLET, EXTENDED RELEASE ORAL
Qty: 90 TABLET | Refills: 1 | Status: SHIPPED | OUTPATIENT
Start: 2024-01-05 | End: 2024-06-25

## 2024-01-05 RX ORDER — AMLODIPINE BESYLATE 10 MG/1
10 TABLET ORAL DAILY
Qty: 90 TABLET | Refills: 3 | OUTPATIENT
Start: 2024-01-05

## 2024-01-16 NOTE — PROGRESS NOTES
AUDIOLOGY REPORT     BACKGROUND INFORMATION: Harriet Lance was seen back in Audiology at the [facility] on 1/22/24 for cochlear implant device selection.  The visit was ordered by Dr. Mariel Duran.      The patient has been diagnosed with moderately severe to severe sensorineural hearing loss in the right ear and moderate sensorineural hearing loss in the left ear.   Word recognition was asymmetrical (4% in the right ear and 44% in the left ear).  Testing on 11/28/23 indicated that the patient was meeting the audiological criteria for a cochlear implant (CI) in the right ear.  Patient saw Dr. Duran for surgical discussion on 12/7/23 and is back in Audiology today to review equipment options.        TEST RESULTS AND PROCEDURES: The 3 available cochlear implant manufacturers were reviewed in detail.  Patient preferred [employer], so she could later (after at least 3 months of CI use) have access to trial a compatible hearing aid (Phonak Link M) and so the cochlear implant could pair to her VouchAR3 phone (Cochlear does not).  Order form was completed.  Patient selected two chestnut brown processors.  For accessories, they selected [employer] iN and TV connector.      The patient's questions about the CI process and equipment were answered.  Through patient interview during the consultation process, this patient demonstrated the cognitive ability to use auditory clues and a willingness to undergo an extended program of rehabilitation.     SUMMARY AND RECOMMENDATIONS: Harriet would like to proceed with a right [employer] cochlear implant.  Equipment was selected today and the surgery scheduler will contact the patient once we have approval for surgery.  I will see the patient back about 3-4 weeks after surgery for right cochlear implant activation.  The patient expressed understanding and agreement with this plan.    Jian Landrum, SOHEILA-BRICE, [facility]  Licensed  Audiologist  [place] #[id number]    Cc Mariel Duran MD     ADDENDUM: Dr. Duran confirmed she would like [employer] Manzana Ultra 3D slim J and back up for this surgery.     Jian Landrum, CCC-A, [facility]  Licensed Audiologist  [place] #[id number]

## 2024-01-22 ENCOUNTER — OFFICE VISIT (OUTPATIENT)
Dept: AUDIOLOGY | Facility: CLINIC | Age: 79
End: 2024-01-22
Payer: COMMERCIAL

## 2024-01-22 DIAGNOSIS — H90.3 SENSORINEURAL HEARING LOSS (SNHL) OF BOTH EARS: Primary | ICD-10-CM

## 2024-01-22 PROCEDURE — 99207 PR ASSESSMENT FOR HEARING AID: CPT | Performed by: AUDIOLOGIST-HEARING AID FITTER

## 2024-01-25 ENCOUNTER — ANCILLARY PROCEDURE (OUTPATIENT)
Dept: MAMMOGRAPHY | Facility: CLINIC | Age: 79
End: 2024-01-25
Payer: COMMERCIAL

## 2024-01-25 DIAGNOSIS — Z12.31 VISIT FOR SCREENING MAMMOGRAM: ICD-10-CM

## 2024-01-25 PROCEDURE — 77063 BREAST TOMOSYNTHESIS BI: CPT | Mod: GC

## 2024-01-25 PROCEDURE — 77067 SCR MAMMO BI INCL CAD: CPT | Mod: GC

## 2024-01-29 ENCOUNTER — OFFICE VISIT (OUTPATIENT)
Dept: OPHTHALMOLOGY | Facility: CLINIC | Age: 79
End: 2024-01-29
Payer: COMMERCIAL

## 2024-01-29 DIAGNOSIS — H43.811 POSTERIOR VITREOUS DETACHMENT OF RIGHT EYE: Primary | ICD-10-CM

## 2024-01-29 PROCEDURE — 99203 OFFICE O/P NEW LOW 30 MIN: CPT | Mod: 24

## 2024-01-29 ASSESSMENT — CONF VISUAL FIELD
OD_SUPERIOR_NASAL_RESTRICTION: 0
OS_NORMAL: 1
OS_INFERIOR_TEMPORAL_RESTRICTION: 0
OS_SUPERIOR_NASAL_RESTRICTION: 0
OS_INFERIOR_NASAL_RESTRICTION: 0
OD_INFERIOR_NASAL_RESTRICTION: 0
OS_SUPERIOR_TEMPORAL_RESTRICTION: 0
OD_SUPERIOR_TEMPORAL_RESTRICTION: 0
OD_NORMAL: 1
OD_INFERIOR_TEMPORAL_RESTRICTION: 0

## 2024-01-29 ASSESSMENT — VISUAL ACUITY
OD_SC: 20/25
OD_SC+: -3
OS_SC+: -1
OS_SC: 20/20
OD_PH_SC: 20/20
METHOD: SNELLEN - LINEAR

## 2024-01-29 ASSESSMENT — SLIT LAMP EXAM - LIDS
COMMENTS: TR MGD
COMMENTS: TR MGD

## 2024-01-29 ASSESSMENT — TONOMETRY
OS_IOP_MMHG: 14
OD_IOP_MMHG: 11
IOP_METHOD: ICARE

## 2024-01-29 ASSESSMENT — CUP TO DISC RATIO: OD_RATIO: 0.35

## 2024-01-29 ASSESSMENT — EXTERNAL EXAM - RIGHT EYE: OD_EXAM: NORMAL

## 2024-01-29 ASSESSMENT — EXTERNAL EXAM - LEFT EYE: OS_EXAM: NORMAL

## 2024-01-29 NOTE — PROGRESS NOTES
Assessment and Plan:  1) Posterior Vitreous Detachment, right eye  A: Symptomatic flashes/new floater x 1 week.  Retina flat and intact 360 today.  P: Patient educated on condition.  Reviewed signs/symptoms of RD.  Instructed patient to seek care immediately in the event of flashes, shower of floaters, or curtain/veil over vision.  Follow-up in 1 month.    Complete documentation of historical and exam elements from today's encounter can be found in the full encounter summary report (not reduplicated in this progress note). I personally obtained the chief complaint(s) and history of present illness. I confirmed and edited as necessary the review of systems, past medical/surgical history, family history, social history, and examination findings as document by others; and I examined the patient myself. I personally reviewed the relevant tests, images, and reports as documented above. I formulated and edited as necessary the assessment and plan and discussed the findings and management plan with the patient and family.    Sherita Brown, OD

## 2024-01-29 NOTE — NURSING NOTE
"Chief Complaints and History of Present Illnesses   Patient presents with    flashes and floaters     Awoke with floater right eye again along with intermittent flashes in the periphery.     Chief Complaint(s) and History of Present Illness(es)       flashes and floaters              Comments: Awoke with floater right eye again along with intermittent flashes in the periphery.              Comments    This week every morning since Floaters began waking with gritty discharge right eye. Did have once instance of slight pain 3 days ago right eye that last an instance and no pain since. Still has many floaters right eye at present. Can see through them. There is the appearance of string in vision right eye and piece of cotton in the nasal corner of right eye.   Vision right eye seem to lessened or harder to see \" With the junk in vision right eye.\"  Having more tearing with right eye the past week as well.   No issues with any symptoms left eye.     Aaliyah Walters, COT COT 1:13 PM January 29, 2024                            "

## 2024-01-29 NOTE — NURSING NOTE
"Chief Complaints and History of Present Illnesses   Patient presents with    flashes and floaters     Awoke with floater right eye again along with intermittent flashes in the periphery.     Chief Complaint(s) and History of Present Illness(es)       flashes and floaters              Comments: Awoke with floater right eye again along with intermittent flashes in the periphery.              Comments    This week every morning since Floaters began waking with gritty discharge right eye.   Many floaters. Can see through them. There is the appearance of string in vision right eye and piece of cotton in the nasal corner of right eye.   Vision right eye seem to lessened or harder to see \" With the junk in vision right eye.\"  Having more tearing with right eye the past week as well.   No pain at all.   No issues with any symptoms left eye.     Aaliyah Walters, COT COT 1:13 PM January 29, 2024                            "

## 2024-02-17 DIAGNOSIS — I10 HYPERTENSION GOAL BP (BLOOD PRESSURE) < 140/90: ICD-10-CM

## 2024-02-19 RX ORDER — AMLODIPINE BESYLATE 10 MG/1
10 TABLET ORAL DAILY
Qty: 90 TABLET | Refills: 3 | OUTPATIENT
Start: 2024-02-19

## 2024-02-26 ENCOUNTER — OFFICE VISIT (OUTPATIENT)
Dept: OPHTHALMOLOGY | Facility: CLINIC | Age: 79
End: 2024-02-26
Payer: COMMERCIAL

## 2024-02-26 DIAGNOSIS — H40.003 GLAUCOMA SUSPECT OF BOTH EYES: ICD-10-CM

## 2024-02-26 DIAGNOSIS — H52.4 PRESBYOPIA OF BOTH EYES: ICD-10-CM

## 2024-02-26 DIAGNOSIS — Z96.1 PSEUDOPHAKIA, BOTH EYES: ICD-10-CM

## 2024-02-26 DIAGNOSIS — H43.811 POSTERIOR VITREOUS DETACHMENT OF RIGHT EYE: Primary | ICD-10-CM

## 2024-02-26 PROCEDURE — 92014 COMPRE OPH EXAM EST PT 1/>: CPT | Performed by: OPHTHALMOLOGY

## 2024-02-26 ASSESSMENT — PACHYMETRY
OD_CT(UM): 553
OS_CT(UM): 552

## 2024-02-26 ASSESSMENT — CUP TO DISC RATIO: OD_RATIO: 0.35

## 2024-02-26 ASSESSMENT — SLIT LAMP EXAM - LIDS
COMMENTS: TR MGD
COMMENTS: TR MGD

## 2024-02-26 ASSESSMENT — EXTERNAL EXAM - RIGHT EYE: OD_EXAM: NORMAL

## 2024-02-26 ASSESSMENT — VISUAL ACUITY
METHOD: SNELLEN - LINEAR
OD_SC+: -1
OD_SC: 20/25
OS_SC: 20/20

## 2024-02-26 ASSESSMENT — TONOMETRY
OS_IOP_MMHG: 13
OD_IOP_MMHG: 13
IOP_METHOD: TONOPEN

## 2024-02-26 ASSESSMENT — EXTERNAL EXAM - LEFT EYE: OS_EXAM: NORMAL

## 2024-02-26 NOTE — PROGRESS NOTES
CC:  PVD follow-up   status post cataract surgery, left eye 11/20/23,  right eye 11/13/23    HPI:  Harriet Lance is a 78 year old female here for above.  Saw Dr. Brown and was found to have new posterior vitreous detachment right eye on 1/29/24.  Floaters are less prominent.  HPI       Follow Up    In right eye.  Since onset it is gradually improving.  Associated symptoms include dryness and floaters.  Negative for eye pain and flashes.  Treatments tried include artificial tears.  Pain was noted as 0/10. Additional comments: Posterior vitreous detachment of right eye             Comments    She states that she still sees floaters in her right eye but they are improving.   She thinks she is both seeing less floaters and is getting better at ignoring them.  She has used artificial tears twice in the past month.    Carolyn Mena, COT 9:42 AM  February 26, 2024               Last edited by Carolyn Mena COT on 2/26/2024  9:43 AM.          PMH:  Hypertension, seasonal allergies  POH:  Glasses for reading only, cataract surgery 11/2023, glaucoma suspect, no surgery, no trauma, posterior vitreous detachment right eye 1/29/24  Oc Meds:  None (latan in the past at [facility] [place] was taken off)  occasional artificial tear drops -rare  FH:  Denies any glaucoma, age related macular degeneration, or other known eye diseases     Assessment/Plan:  1. Posterior vitreous detachment of right eye - Right Eye    2. Pseudophakia, both eyes - Both Eyes    3. Presbyopia of both eyes - Both Eyes    4. Glaucoma suspect of both eyes - Both Eyes      (H43.811) PVD (posterior vitreous detachment), right eye - right eye   Comment: onset 1/29/24, stable   No vitreous hemorrhage/pigment, retinal tears, or detachment seen on exam today.  Plan:  Natural history of posterior vitreous detachment as well as retinal tear/detachment symptoms discussed with patient.  Told to call for prompt dilated exam if these symptoms occur.    (Z96.1) Pseudophakia of  both eyes - Both Eyes  Presbyopia both eyes   Comment: great result   Plan: increase over the counter readers to +2.50  Call for new decreasing vision, eye pain, increased redness, new floaters or flashes of light, or other concerning symptoms.    (H40.003) Glaucoma suspect of both eyes  (primary encounter diagnosis)  Comment: based on cup to discontinue, [facility] since at least 2005, was on Travatan in past but was taken off in past -- K pachy: 553/552   Tmax: 20/22    HVF: Fluct in 2015, 2016     CDR: 0.3/0.5    HRT/OCT: RNFL WNL      FHX of Glc:  No    Gonio: open      Intolerant to:      Asthma/COPD: No, on po BB  Steroid Use: No    Kidney Stones: No    Sulfa Allergy:No      IOP targets:L20s -- IOP good today off drops  Plan: OCT Optic Nerve RNFL Spectralis OU last done 3/23 , low suspicion, do later this year         Return in about 6 months (around 8/26/2024) for OCT nerve (RNFL) OU, Octopus 24-2, Follow-up glaucoma suspect, pseudophakia.       Complete documentation of historical and exam elements from today's encounter can be found in the full encounter summary report (not reduplicated in this progress note). I personally obtained the chief complaint(s) and history of present illness.  I have confirmed and edited as necessary the CC, HPI, PMH/PSH, social history, FMH, ROS, and exam/neuro findings as obtained by the technician or others. I have examined this patient myself and I personally viewed the image(s) and studies listed above and the documentation reflects my findings and interpretation.  I formulated and edited as necessary the assessment and plan and discussed the findings and management plan with the patient and family.     Balbina Aguilar MD

## 2024-02-26 NOTE — NURSING NOTE
Chief Complaints and History of Present Illnesses   Patient presents with    Follow Up     Posterior vitreous detachment of right eye     Chief Complaint(s) and History of Present Illness(es)       Follow Up              Laterality: right eye    Course: gradually improving    Associated symptoms: dryness and floaters.  Negative for eye pain and flashes    Treatments tried: artificial tears    Pain scale: 0/10    Comments: Posterior vitreous detachment of right eye              Comments    She states that she still sees floaters in her right eye but they are improving.   She thinks she is both seeing less floaters and is getting better at ignoring them.  She has used artificial tears twice in the past month.    Carolyn Mena, COT 9:42 AM  February 26, 2024

## 2024-04-15 DIAGNOSIS — I10 HYPERTENSION GOAL BP (BLOOD PRESSURE) < 140/90: ICD-10-CM

## 2024-04-15 RX ORDER — AMLODIPINE BESYLATE 10 MG/1
10 TABLET ORAL DAILY
Qty: 90 TABLET | Refills: 0 | Status: SHIPPED | OUTPATIENT
Start: 2024-04-15 | End: 2024-06-25

## 2024-05-17 DIAGNOSIS — I10 HYPERTENSION GOAL BP (BLOOD PRESSURE) < 140/90: ICD-10-CM

## 2024-05-17 RX ORDER — LOSARTAN POTASSIUM 25 MG/1
25 TABLET ORAL DAILY
Qty: 90 TABLET | Refills: 0 | Status: SHIPPED | OUTPATIENT
Start: 2024-05-17 | End: 2024-06-25

## 2024-06-23 SDOH — HEALTH STABILITY: PHYSICAL HEALTH: ON AVERAGE, HOW MANY DAYS PER WEEK DO YOU ENGAGE IN MODERATE TO STRENUOUS EXERCISE (LIKE A BRISK WALK)?: 3 DAYS

## 2024-06-23 SDOH — HEALTH STABILITY: PHYSICAL HEALTH: ON AVERAGE, HOW MANY MINUTES DO YOU ENGAGE IN EXERCISE AT THIS LEVEL?: 10 MIN

## 2024-06-23 ASSESSMENT — SOCIAL DETERMINANTS OF HEALTH (SDOH): HOW OFTEN DO YOU GET TOGETHER WITH FRIENDS OR RELATIVES?: THREE TIMES A WEEK

## 2024-06-25 ENCOUNTER — OFFICE VISIT (OUTPATIENT)
Dept: FAMILY MEDICINE | Facility: CLINIC | Age: 79
End: 2024-06-25
Attending: FAMILY MEDICINE
Payer: COMMERCIAL

## 2024-06-25 VITALS
RESPIRATION RATE: 19 BRPM | WEIGHT: 189 LBS | TEMPERATURE: 97.2 F | SYSTOLIC BLOOD PRESSURE: 112 MMHG | DIASTOLIC BLOOD PRESSURE: 62 MMHG | HEIGHT: 65 IN | OXYGEN SATURATION: 95 % | HEART RATE: 85 BPM | BODY MASS INDEX: 31.49 KG/M2

## 2024-06-25 DIAGNOSIS — E78.5 HYPERLIPIDEMIA WITH TARGET LDL LESS THAN 130: ICD-10-CM

## 2024-06-25 DIAGNOSIS — N89.8 VAGINAL DISCHARGE: ICD-10-CM

## 2024-06-25 DIAGNOSIS — N18.30 STAGE 3 CHRONIC KIDNEY DISEASE, UNSPECIFIED WHETHER STAGE 3A OR 3B CKD (H): ICD-10-CM

## 2024-06-25 DIAGNOSIS — I10 HYPERTENSION GOAL BP (BLOOD PRESSURE) < 140/90: ICD-10-CM

## 2024-06-25 DIAGNOSIS — Z00.00 ENCOUNTER FOR MEDICARE ANNUAL WELLNESS EXAM: Primary | ICD-10-CM

## 2024-06-25 LAB
ALBUMIN SERPL BCG-MCNC: 4.2 G/DL (ref 3.5–5.2)
ALP SERPL-CCNC: 80 U/L (ref 40–150)
ALT SERPL W P-5'-P-CCNC: 25 U/L (ref 0–50)
ANION GAP SERPL CALCULATED.3IONS-SCNC: 11 MMOL/L (ref 7–15)
AST SERPL W P-5'-P-CCNC: 26 U/L (ref 0–45)
BILIRUB SERPL-MCNC: 0.3 MG/DL
BUN SERPL-MCNC: 8.5 MG/DL (ref 8–23)
CALCIUM SERPL-MCNC: 9.1 MG/DL (ref 8.8–10.2)
CHLORIDE SERPL-SCNC: 105 MMOL/L (ref 98–107)
CHOLEST SERPL-MCNC: 116 MG/DL
CLUE CELLS: ABNORMAL
CREAT SERPL-MCNC: 0.95 MG/DL (ref 0.51–0.95)
CREAT UR-MCNC: 114 MG/DL
DEPRECATED HCO3 PLAS-SCNC: 26 MMOL/L (ref 22–29)
EGFRCR SERPLBLD CKD-EPI 2021: 61 ML/MIN/1.73M2
ERYTHROCYTE [DISTWIDTH] IN BLOOD BY AUTOMATED COUNT: 13.7 % (ref 10–15)
FASTING STATUS PATIENT QL REPORTED: YES
FASTING STATUS PATIENT QL REPORTED: YES
GLUCOSE SERPL-MCNC: 95 MG/DL (ref 70–99)
HCT VFR BLD AUTO: 38.6 % (ref 35–47)
HDLC SERPL-MCNC: 37 MG/DL
HGB BLD-MCNC: 12.7 G/DL (ref 11.7–15.7)
LDLC SERPL CALC-MCNC: 51 MG/DL
MCH RBC QN AUTO: 26.7 PG (ref 26.5–33)
MCHC RBC AUTO-ENTMCNC: 32.9 G/DL (ref 31.5–36.5)
MCV RBC AUTO: 81 FL (ref 78–100)
MICROALBUMIN UR-MCNC: 12.6 MG/L
MICROALBUMIN/CREAT UR: 11.05 MG/G CR (ref 0–25)
NONHDLC SERPL-MCNC: 79 MG/DL
PLATELET # BLD AUTO: 258 10E3/UL (ref 150–450)
POTASSIUM SERPL-SCNC: 3.2 MMOL/L (ref 3.4–5.3)
PROT SERPL-MCNC: 7.5 G/DL (ref 6.4–8.3)
RBC # BLD AUTO: 4.76 10E6/UL (ref 3.8–5.2)
SODIUM SERPL-SCNC: 142 MMOL/L (ref 135–145)
TRICHOMONAS, WET PREP: ABNORMAL
TRIGL SERPL-MCNC: 139 MG/DL
WBC # BLD AUTO: 6.2 10E3/UL (ref 4–11)
WBC'S/HIGH POWER FIELD, WET PREP: ABNORMAL
YEAST, WET PREP: ABNORMAL

## 2024-06-25 PROCEDURE — G0439 PPPS, SUBSEQ VISIT: HCPCS | Performed by: FAMILY MEDICINE

## 2024-06-25 PROCEDURE — 82043 UR ALBUMIN QUANTITATIVE: CPT | Performed by: FAMILY MEDICINE

## 2024-06-25 PROCEDURE — 87210 SMEAR WET MOUNT SALINE/INK: CPT | Performed by: FAMILY MEDICINE

## 2024-06-25 PROCEDURE — 82570 ASSAY OF URINE CREATININE: CPT | Performed by: FAMILY MEDICINE

## 2024-06-25 PROCEDURE — 36415 COLL VENOUS BLD VENIPUNCTURE: CPT | Performed by: FAMILY MEDICINE

## 2024-06-25 PROCEDURE — 80053 COMPREHEN METABOLIC PANEL: CPT | Performed by: FAMILY MEDICINE

## 2024-06-25 PROCEDURE — 99214 OFFICE O/P EST MOD 30 MIN: CPT | Mod: 25 | Performed by: FAMILY MEDICINE

## 2024-06-25 PROCEDURE — 90480 ADMN SARSCOV2 VAC 1/ONLY CMP: CPT | Performed by: FAMILY MEDICINE

## 2024-06-25 PROCEDURE — 80061 LIPID PANEL: CPT | Performed by: FAMILY MEDICINE

## 2024-06-25 PROCEDURE — 85027 COMPLETE CBC AUTOMATED: CPT | Performed by: FAMILY MEDICINE

## 2024-06-25 PROCEDURE — 91320 SARSCV2 VAC 30MCG TRS-SUC IM: CPT | Performed by: FAMILY MEDICINE

## 2024-06-25 RX ORDER — PRAVASTATIN SODIUM 40 MG
40 TABLET ORAL DAILY
Qty: 90 TABLET | Refills: 3 | Status: SHIPPED | OUTPATIENT
Start: 2024-06-25

## 2024-06-25 RX ORDER — AMLODIPINE BESYLATE 10 MG/1
10 TABLET ORAL DAILY
Qty: 90 TABLET | Refills: 1 | Status: SHIPPED | OUTPATIENT
Start: 2024-06-25

## 2024-06-25 RX ORDER — RESPIRATORY SYNCYTIAL VIRUS VACCINE 120MCG/0.5
0.5 KIT INTRAMUSCULAR ONCE
Qty: 1 EACH | Refills: 0 | Status: CANCELLED | OUTPATIENT
Start: 2024-06-25 | End: 2024-06-25

## 2024-06-25 RX ORDER — HYDROCHLOROTHIAZIDE 25 MG/1
25 TABLET ORAL DAILY
Qty: 90 TABLET | Refills: 1 | Status: SHIPPED | OUTPATIENT
Start: 2024-06-25

## 2024-06-25 RX ORDER — LOSARTAN POTASSIUM 25 MG/1
25 TABLET ORAL DAILY
Qty: 90 TABLET | Refills: 1 | Status: SHIPPED | OUTPATIENT
Start: 2024-06-25

## 2024-06-25 RX ORDER — METOPROLOL SUCCINATE 50 MG/1
TABLET, EXTENDED RELEASE ORAL
Qty: 90 TABLET | Refills: 1 | Status: SHIPPED | OUTPATIENT
Start: 2024-06-25

## 2024-06-25 ASSESSMENT — PAIN SCALES - GENERAL: PAINLEVEL: NO PAIN (0)

## 2024-06-25 NOTE — NURSING NOTE
Prior to immunization administration, verified patients identity using patient s name and date of birth. Please see Immunization Activity for additional information.     Screening Questionnaire for Adult Immunization    Are you sick today?   No   Do you have allergies to medications, food, a vaccine component or latex?   No   Have you ever had a serious reaction after receiving a vaccination?   No   Do you have a long-term health problem with heart, lung, kidney, or metabolic disease (e.g., diabetes), asthma, a blood disorder, no spleen, complement component deficiency, a cochlear implant, or a spinal fluid leak?  Are you on long-term aspirin therapy?   No   Do you have cancer, leukemia, HIV/AIDS, or any other immune system problem?   No   Do you have a parent, brother, or sister with an immune system problem?   No   In the past 3 months, have you taken medications that affect  your immune system, such as prednisone, other steroids, or anticancer drugs; drugs for the treatment of rheumatoid arthritis, Crohn s disease, or psoriasis; or have you had radiation treatments?   No   Have you had a seizure, or a brain or other nervous system problem?   No   During the past year, have you received a transfusion of blood or blood    products, or been given immune (gamma) globulin or antiviral drug?   No   For women: Are you pregnant or is there a chance you could become       pregnant during the next month?   No   Have you received any vaccinations in the past 4 weeks?   No     Immunization questionnaire answers were all negative.        Patient instructed to remain in clinic for 15 minutes afterwards, and to report any adverse reactions.     Screening performed by Amaury Gonzalez MA on 6/25/2024 at 9:01 AM.

## 2024-06-25 NOTE — PATIENT INSTRUCTIONS
"Patient Education   Preventive Care Advice   This is general advice we often give to help people stay healthy. Your care team may have specific advice just for you. Please talk to your care team about your own preventive care needs.  Lifestyle  Exercise at least 150 minutes each week (30 minutes a day, 5 days a week).  Do muscle strengthening activities 2 days a week. These help control your weight and prevent disease.  No smoking.  Wear sunscreen to prevent skin cancer.  Have your home tested for radon every 2 to 5 years. Radon is a colorless, odorless gas that can harm your lungs. To learn more, go to www.health.[facility].[place] and search for \"Radon in Homes.\"  Keep guns unloaded and locked up in a safe place like a safe or gun vault, or, use a gun lock and hide the keys. Always lock away bullets separately. To learn more, visit [employer].[place].gov and search for \"safe gun storage.\"  Nutrition  Eat 5 or more servings of fruits and vegetables each day.  Try wheat bread, brown rice and whole grain pasta (instead of white bread, rice, and pasta).  Get enough calcium and vitamin D. Check the label on foods and aim for 100% of the RDA (recommended daily allowance).  Regular exams  Have a dental exam and cleaning every 6 months.  See your health care team every year to talk about:  Any changes in your health.  Any medicines your care team has prescribed.  Preventive care, family planning, and ways to prevent chronic diseases.  Shots (vaccines)   HPV shots (up to age 26), if you've never had them before.  Hepatitis B shots (up to age 59), if you've never had them before.  COVID-19 shot: Get this shot when it's due.  Flu shot: Get a flu shot every year.  Tetanus shot: Get a tetanus shot every 10 years.  Pneumococcal, hepatitis A, and RSV shots: Ask your care team if you need these based on your risk.  Shingles shot (for age 50 and up).  General health tests  Diabetes screening:  Starting at age 35, Get screened for diabetes at least " every 3 years.  If you are younger than age 35, ask your care team if you should be screened for diabetes.  Cholesterol test: At age 39, start having a cholesterol test every 5 years, or more often if advised.  Bone density scan (DEXA): At age 50, ask your care team if you should have this scan for osteoporosis (brittle bones).  Hepatitis C: Get tested at least once in your life.  Abdominal aortic aneurysm screening: Talk to your doctor about having this screening if you:  Have ever smoked; and  Are biologically male; and  Are between the ages of 65 and 75.  STIs (sexually transmitted infections)  Before age 24: Ask your care team if you should be screened for STIs.  After age 24: Get screened for STIs if you're at risk. You are at risk for STIs (including HIV) if:  You are sexually active with more than one person.  You don't use condoms every time.  You or a partner was diagnosed with a sexually transmitted infection.  If you are at risk for HIV, ask about PrEP medicine to prevent HIV.  Get tested for HIV at least once in your life, whether you are at risk for HIV or not.  Cancer screening tests  Cervical cancer screening: If you have a cervix, begin getting regular cervical cancer screening tests at age 21. Most people who have regular screenings with normal results can stop after age 65. Talk about this with your provider.  Breast cancer scan (mammogram): If you've ever had breasts, begin having regular mammograms starting at age 40. This is a scan to check for breast cancer.  Colon cancer screening: It is important to start screening for colon cancer at age 45.  Have a colonoscopy test every 10 years (or more often if you're at risk) Or, ask your provider about stool tests like a FIT test every year or Cologuard test every 3 years.  To learn more about your testing options, visit: www.[employer]/762437.pdf.  For help making a decision, visit: finn/me59767.  Prostate cancer screening test: If you have a  prostate and are age 55 to 69, ask your provider if you would benefit from a yearly prostate cancer screening test.  Lung cancer screening: If you are a current or former smoker age 50 to 80, ask your care team if ongoing lung cancer screenings are right for you.  For informational purposes only. Not to replace the advice of your health care provider. Copyright   2023 [place] [facility]. All rights reserved. Clinically reviewed by the  [facility] [place] [facility]. TelemetryWeb [id number] - REV 04/24.  Hearing Loss: Care Instructions  Overview     Hearing loss is a sudden or slow decrease in how well you hear. It can range from slight to profound. Permanent hearing loss can occur with aging. It also can happen when you are exposed long-term to loud noise. Examples include listening to loud music, riding motorcycles, or being around other loud machines.  Hearing loss can affect your work and home life. It can make you feel lonely or depressed. You may feel that you have lost your independence. But hearing aids and other devices can help you hear better and feel connected to others.  Follow-up care is a key part of your treatment and safety. Be sure to make and go to all appointments, and call your doctor if you are having problems. It's also a good idea to know your test results and keep a list of the medicines you take.  How can you care for yourself at home?  Avoid loud noises whenever possible. This helps keep your hearing from getting worse.  Always wear hearing protection around loud noises.  Wear a hearing aid as directed.  A professional can help you pick a hearing aid that will work best for you.  You can also get hearing aids over the counter for mild to moderate hearing loss.  Have hearing tests as your doctor suggests. They can show whether your hearing has changed. Your hearing aid may need to be adjusted.  Use other devices as needed. These may include:  Telephone amplifiers and hearing aids that  "can connect to a television, stereo, radio, or microphone.  Devices that use lights or vibrations. These alert you to the doorbell, a ringing telephone, or a baby monitor.  Television closed-captioning. This shows the words at the bottom of the screen. Most new TVs can do this.  TTY (text telephone). This lets you type messages back and forth on the telephone instead of talking or listening. These devices are also called TDD. When messages are typed on the keyboard, they are sent over the phone line to a receiving TTY. The message is shown on a monitor.  Use text messaging, social media, and email if it is hard for you to communicate by telephone.  Try to learn a listening technique called speechreading. It is not lipreading. You pay attention to people's gestures, expressions, posture, and tone of voice. These clues can help you understand what a person is saying. Face the person you are talking to, and have them face you. Make sure the lighting is good. You need to see the other person's face clearly.  Think about counseling if you need help to adjust to your hearing loss.  When should you call for help?  Watch closely for changes in your health, and be sure to contact your doctor if:    You think your hearing is getting worse.     You have new symptoms, such as dizziness or nausea.   Where can you learn more?  Go to https://www.Twitter.net/patiented  Enter R798 in the search box to learn more about \"Hearing Loss: Care Instructions.\"  Current as of: September 27, 2023               Content Version: 14.0    0893-4879 [employer].   Care instructions adapted under license by your healthcare professional. If you have questions about a medical condition or this instruction, always ask your healthcare professional. [employer] disclaims any warranty or liability for your use of this information.         "

## 2024-06-25 NOTE — PROGRESS NOTES
"Preventive Care Visit  [facility]  Jolanta Wilkerson DO, Family Medicine  Jun 25, 2024      Assessment & Plan     Encounter for Medicare annual wellness exam       Stage 3 chronic kidney disease, unspecified whether stage 3a or 3b CKD (H)  - Albumin Random Urine Quantitative with Creat Ratio; Future  - Comprehensive metabolic panel (BMP + Alb, Alk Phos, ALT, AST, Total. Bili, TP); Future  - CBC with platelets; Future  - Albumin Random Urine Quantitative with Creat Ratio  - Comprehensive metabolic panel (BMP + Alb, Alk Phos, ALT, AST, Total. Bili, TP)  - CBC with platelets    Hyperlipidemia with target LDL less than 130  Lipids pending   - Lipid panel reflex to direct LDL Fasting; Future  - pravastatin (PRAVACHOL) 40 MG tablet; Take 1 tablet (40 mg) by mouth daily  - Lipid panel reflex to direct LDL Fasting    Hypertension goal BP (blood pressure) < 140/90  BP controlled -   Refilled medication   - Comprehensive metabolic panel (BMP + Alb, Alk Phos, ALT, AST, Total. Bili, TP); Future  - CBC with platelets; Future  - amLODIPine (NORVASC) 10 MG tablet; Take 1 tablet (10 mg) by mouth daily  - hydrochlorothiazide (HYDRODIURIL) 25 MG tablet; Take 1 tablet (25 mg) by mouth daily  - losartan (COZAAR) 25 MG tablet; Take 1 tablet (25 mg) by mouth daily  - metoprolol succinate ER (TOPROL XL) 50 MG 24 hr tablet; Take 1/2 tablet (25mg) twice a day  - Comprehensive metabolic panel (BMP + Alb, Alk Phos, ALT, AST, Total. Bili, TP)  - CBC with platelets    Vaginal discharge  Pending wet prep   Vaginal discharge for 6 months  No other risk factors   - Wet prep - lab collect; Future  - Wet prep - lab collect    Patient has been advised of split billing requirements and indicates understanding: Yes        BMI  Estimated body mass index is 31.45 kg/m  as calculated from the following:    Height as of this encounter: 1.651 m (5' 5\").    Weight as of this encounter: 85.7 kg (189 lb).   Weight management plan: Discussed " healthy diet and exercise guidelines    Counseling  Appropriate preventive services were discussed with this patient, including applicable screening as appropriate for fall prevention, nutrition, physical activity, Tobacco-use cessation, weight loss and cognition.  Checklist reviewing preventive services available has been given to the patient.  Reviewed patient's diet, addressing concerns and/or questions.   She is at risk for lack of exercise and has been provided with information to increase physical activity for the benefit of her well-being.   The patient was provided with written information regarding signs of hearing loss.           Lala Larios is a 79 year old, presenting for the following:  Annual Visit        6/25/2024     7:45 AM   Additional Questions   Roomed by li ji   Accompanied by nicole         6/25/2024     7:45 AM   Patient Reported Additional Medications   Patient reports taking the following new medications n/a         Health Care Directive  Patient does not have a Health Care Directive or Living Will: Discussed advance care planning with patient; information given to patient to review.    HPI              6/23/2024   General Health   How would you rate your overall physical health? Good   Feel stress (tense, anxious, or unable to sleep) To some extent      (!) STRESS CONCERN      6/23/2024   Nutrition   Diet: Low salt    Low fat/cholesterol       Multiple values from one day are sorted in reverse-chronological order         6/23/2024   Exercise   Days per week of moderate/strenous exercise 3 days   Average minutes spent exercising at this level 10 min            6/23/2024   Social Factors   Frequency of gathering with friends or relatives Three times a week   Worry food won't last until get money to buy more No   Food not last or not have enough money for food? No   Do you have housing? (Housing is defined as stable permanent housing and does not include staying ouside in a car, in  a tent, in an abandoned building, in an overnight shelter, or couch-surfing.) Yes   Are you worried about losing your housing? No   Lack of transportation? No   Unable to get utilities (heat,electricity)? No            6/23/2024   Fall Risk   Fallen 2 or more times in the past year? No    No   Trouble with walking or balance? No    No       Multiple values from one day are sorted in reverse-chronological order          6/23/2024   Activities of Daily Living- Home Safety   Needs help with the following daily activites None of the above   Safety concerns in the home None of the above            6/23/2024   Dental   Dentist two times every year? Yes            6/23/2024   Hearing Screening   Hearing concerns? (!) IT'S HARD TO FOLLOW A CONVERSATION IN A NOISY RESTAURANT OR CROWDED ROOM.    (!) TROUBLE UNDERSTANDING SPEECH ON THE TELEPHONE       Multiple values from one day are sorted in reverse-chronological order         6/23/2024   Driving Risk Screening   Patient/family members have concerns about driving No            6/23/2024   General Alertness/Fatigue Screening   Have you been more tired than usual lately? No            6/23/2024   Urinary Incontinence Screening   Bothered by leaking urine in past 6 months No            6/23/2024   TB Screening   Were you born outside of the [place]? Yes            Today's PHQ-2 Score:       6/23/2024     9:29 AM   PHQ-2 ( 1999 Pfizer)   Q1: Little interest or pleasure in doing things 0   Q2: Feeling down, depressed or hopeless 0   PHQ-2 Score 0   Q1: Little interest or pleasure in doing things Not at all   Q2: Feeling down, depressed or hopeless Not at all   PHQ-2 Score 0           6/23/2024   Substance Use   Alcohol more than 3/day or more than 7/wk No   Do you have a current opioid prescription? No   How severe/bad is pain from 1 to 10? 0/10 (No Pain)   Do you use any other substances recreationally? No        Social History     Tobacco Use    Smoking status: Never    Smokeless  tobacco: Never   Vaping Use    Vaping status: Never Used   Substance Use Topics    Alcohol use: Never     Comment: rarely    Drug use: No           1/25/2024   LAST FHS-7 RESULTS   1st degree relative breast or ovarian cancer No   Any relative bilateral breast cancer Unknown   Any male have breast cancer No   Any ONE woman have BOTH breast AND ovarian cancer No   Any woman with breast cancer before 50yrs Unknown   2 or more relatives with breast AND/OR ovarian cancer No   2 or more relatives with breast AND/OR bowel cancer No           Mammogram Screening - After age 74- determine frequency with patient based on health status, life expectancy and patient goals    ASCVD Risk   The ASCVD Risk score (Eric OSCAR, et al., 2019) failed to calculate for the following reasons:    The valid total cholesterol range is 130 to 320 mg/dL            Reviewed and updated as needed this visit by Provider   Tobacco  Allergies  Meds  Problems  Med Hx  Surg Hx  Fam Hx            Patient Active Problem List   Diagnosis    Allergic rhinitis    Diffuse cystic mastopathy    History of malignant neoplasm of large intestine    Enlarged lymph nodes    CARDIOVASCULAR SCREENING; LDL GOAL LESS THAN 100    Borderline glaucoma (glaucoma suspect)    Hypertension goal BP (blood pressure) < 140/90    Arthritis of hip    HL (hearing loss)    Hyperlipidemia with target LDL less than 130    BMI 37.0-37.9, adult    Benign neoplasm of colon    Stage 3 chronic kidney disease, unspecified whether stage 3a or 3b CKD (H)    Mixed type age-related cataract, both eyes     Past Surgical History:   Procedure Laterality Date    AS TOTAL HIP ARTHROPLASTY Right 04/13/2017    done at [facility]    BIOPSY  1975    Age 30    COLONOSCOPY N/A 10/10/2022    Procedure: COLONOSCOPY, WITH POLYPECTOMY;  Surgeon: Dianna La MD;  Location: [facility] OR    GYN SURGERY      ORTHOPEDIC SURGERY  2017,2018    PHACOEMULSIFICATION WITH STANDARD INTRAOCULAR LENS  IMPLANT Right 2023    Procedure: RIGHT EYE PHACOEMULSIFICATION, CATARACT, WITH STANDARD INTRAOCULAR LENS IMPLANT INSERTION;  Surgeon: Balbina Aguilar MD;  Location: [facility] OR    PHACOEMULSIFICATION WITH STANDARD INTRAOCULAR LENS IMPLANT Left 2023    Procedure: LEFT EYE PHACOEMULSIFICATION, CATARACT, WITH STANDARD INTRAOCULAR LENS IMPLANT INSERTION;  Surgeon: Balbina Aguilar MD;  Location: [facility] OR    ZZC REMOVAL COLON/ILEOSTOMY      8 inches of colon removed - not clear what section       Social History     Tobacco Use    Smoking status: Never    Smokeless tobacco: Never   Substance Use Topics    Alcohol use: Never     Comment: rarely     Family History   Problem Relation Age of Onset    Heart Disease Mother          of heart Attack at age 75    Hypertension Mother     Cancer Father         Prostate    Diabetes Father     Prostate Cancer Father     Allergies Father     Cancer - colorectal Maternal Grandmother         Colon Resection    Breast Cancer Maternal Grandmother     Cancer - colorectal Maternal Uncle     Obesity Sister     Glaucoma No family hx of     Macular Degeneration No family hx of     Melanoma No family hx of     Skin Cancer No family hx of          Current providers sharing in care for this patient include:  Patient Care Team:  oJlanta Wilkerson DO as PCP - General  Amaury Ledesma MD as MD (Family Medicine - Sports Medicine)  Jolanta Wilkerson DO as Assigned PCP  Balbina Aguilar MD as MD (Ophthalmology)  Fredis Galvan MD as MD (Dermatology)  Balbina Aguilar MD as Assigned Surgical Provider  Nichole Esquivel AuD as Audiologist (Audiology)  Mariel Duran MD as MD (Otolaryngology)  Nichole Esquivel AuD as Audiologist (Audiology)    The following health maintenance items are reviewed in Epic and correct as of today:  Health Maintenance   Topic Date Due    RSV VACCINE (Pregnancy & 60+) (1 - 1-dose 60+ series) Never done    BMP   "06/16/2024    MICROALBUMIN  06/16/2024    MAMMO SCREENING  01/25/2025    MEDICARE ANNUAL WELLNESS VISIT  06/25/2025    LIPID  06/25/2025    ANNUAL REVIEW OF HM ORDERS  06/25/2025    FALL RISK ASSESSMENT  06/25/2025    HEMOGLOBIN  06/25/2025    COLORECTAL CANCER SCREENING  10/10/2025    GLUCOSE  06/16/2026    DTAP/TDAP/TD IMMUNIZATION (3 - Td or Tdap) 12/15/2027    ADVANCE CARE PLANNING  06/25/2029    DEXA  01/07/2031    HEPATITIS C SCREENING  Completed    PHQ-2 (once per calendar year)  Completed    INFLUENZA VACCINE  Completed    Pneumococcal Vaccine: 65+ Years  Completed    URINALYSIS  Completed    ZOSTER IMMUNIZATION  Completed    COVID-19 Vaccine  Completed    HPV IMMUNIZATION  Aged Out    MENINGITIS IMMUNIZATION  Aged Out    RSV MONOCLONAL ANTIBODY  Aged Out    IPV IMMUNIZATION  Discontinued         Review of Systems  Constitutional, HEENT, cardiovascular, pulmonary, GI, , musculoskeletal, neuro, skin, endocrine and psych systems are negative, except as otherwise noted.     Objective    Exam  /62 (BP Location: Left arm, Patient Position: Sitting, Cuff Size: Adult Regular)   Pulse 85   Temp 97.2  F (36.2  C) (Temporal)   Resp 19   Ht 1.651 m (5' 5\")   Wt 85.7 kg (189 lb)   SpO2 95%   BMI 31.45 kg/m     Estimated body mass index is 31.45 kg/m  as calculated from the following:    Height as of this encounter: 1.651 m (5' 5\").    Weight as of this encounter: 85.7 kg (189 lb).    Physical Exam  GENERAL: alert and no distress  EYES: Eyes grossly normal to inspection, PERRL and conjunctivae and sclerae normal  HENT: ear canals and TM's normal, nose and mouth without ulcers or lesions  NECK: no adenopathy, no asymmetry, masses, or scars  RESP: lungs clear to auscultation - no rales, rhonchi or wheezes  BREAST: normal without masses, tenderness or nipple discharge and no palpable axillary masses or adenopathy  CV: regular rate and rhythm, normal S1 S2, no S3 or S4, no murmur, click or rub, no peripheral " edema  ABDOMEN: soft, nontender, no hepatosplenomegaly, no masses and bowel sounds normal  MS: no gross musculoskeletal defects noted, no edema  SKIN: no suspicious lesions or rashes  NEURO: Normal strength and tone, mentation intact and speech normal  PSYCH: mentation appears normal, affect normal/bright         6/25/2024   Mini Cog   Clock Draw Score 2 Normal   3 Item Recall 2 objects recalled   Mini Cog Total Score 4                 Signed Electronically by: Jolanta Wilkerson, DO

## 2024-06-26 NOTE — RESULT ENCOUNTER NOTE
Dear Harriet,   Your test results are all back -   -Normal red blood cell (hgb) levels, normal white blood cell count and normal platelet levels.  -Cholesterol levels (LDL,HDL, Triglycerides) are normal.  ADVISE: rechecking in 1 year.   -Liver and gallbladder tests (ALT,AST, Alk phos,bilirubin) are normal.  -Kidney function (GFR) is normal.  -Sodium is normal.  -Potassium is decreased.  ADVISE: rechecking this in 1 month.  -Calcium is normal.  -Glucose (diabetic screening test) is normal.  -Microalbumin (urine protein) test is normal.  ADVISE: rechecking this annually.  -No signs of bacteria or yeast vaginal infections.  Let us know if you have any questions.  -Jolanta Wilkerson, DO

## 2024-09-05 ENCOUNTER — OFFICE VISIT (OUTPATIENT)
Dept: OPHTHALMOLOGY | Facility: CLINIC | Age: 79
End: 2024-09-05
Payer: COMMERCIAL

## 2024-09-05 DIAGNOSIS — Z96.1 PSEUDOPHAKIA, BOTH EYES: ICD-10-CM

## 2024-09-05 DIAGNOSIS — H43.811 POSTERIOR VITREOUS DETACHMENT OF RIGHT EYE: ICD-10-CM

## 2024-09-05 DIAGNOSIS — H40.003 GLAUCOMA SUSPECT OF BOTH EYES: Primary | ICD-10-CM

## 2024-09-05 DIAGNOSIS — H04.121 DRY EYE SYNDROME OF RIGHT EYE: ICD-10-CM

## 2024-09-05 PROCEDURE — 92133 CPTRZD OPH DX IMG PST SGM ON: CPT | Performed by: OPHTHALMOLOGY

## 2024-09-05 PROCEDURE — 92083 EXTENDED VISUAL FIELD XM: CPT | Performed by: OPHTHALMOLOGY

## 2024-09-05 PROCEDURE — 92014 COMPRE OPH EXAM EST PT 1/>: CPT | Performed by: OPHTHALMOLOGY

## 2024-09-05 ASSESSMENT — CONF VISUAL FIELD
OD_SUPERIOR_NASAL_RESTRICTION: 0
OS_NORMAL: 1
OS_SUPERIOR_TEMPORAL_RESTRICTION: 0
OS_INFERIOR_NASAL_RESTRICTION: 0
OS_SUPERIOR_NASAL_RESTRICTION: 0
OS_INFERIOR_TEMPORAL_RESTRICTION: 0
OD_SUPERIOR_TEMPORAL_RESTRICTION: 0
OD_INFERIOR_NASAL_RESTRICTION: 0
OD_NORMAL: 1
OD_INFERIOR_TEMPORAL_RESTRICTION: 0

## 2024-09-05 ASSESSMENT — TONOMETRY
IOP_METHOD: APPLANATION
OS_IOP_MMHG: 16
OD_IOP_MMHG: 12

## 2024-09-05 ASSESSMENT — SLIT LAMP EXAM - LIDS
COMMENTS: TR MGD
COMMENTS: TR MGD

## 2024-09-05 ASSESSMENT — VISUAL ACUITY
METHOD: SNELLEN - LINEAR
OS_SC: 20/20
OS_SC+: -2
OD_SC: 20/20
METHOD_MR: PT DECLINES MRX

## 2024-09-05 ASSESSMENT — EXTERNAL EXAM - LEFT EYE: OS_EXAM: NORMAL

## 2024-09-05 ASSESSMENT — PACHYMETRY
OD_CT(UM): 553
OS_CT(UM): 552

## 2024-09-05 ASSESSMENT — CUP TO DISC RATIO
OD_RATIO: 0.35
OS_RATIO: 0.55

## 2024-09-05 ASSESSMENT — EXTERNAL EXAM - RIGHT EYE: OD_EXAM: NORMAL

## 2024-09-05 NOTE — PROGRESS NOTES
"CC:  comprehensive eye exam     HPI:  Harriet Lance is a 79 year old female here for above.  Seeing well and eyes are both comfortable.  HPI       COMPREHENSIVE EYE EXAM    In both eyes.  Associated symptoms include floaters (\"rarley\" per pt).  Negative for dryness, eye pain, tearing and flashes.  Treatments tried include no treatments.  Pain was noted as 0/10.             Comments    Pt is using OTC reading gls for near vision, +2.50 and states they work well for her. Her for comprehensive eye exam today, she has no concerns     Sandy Self COT September 5, 2024 9:12 AM              Last edited by Cheli Self on 9/5/2024  9:13 AM.          PMH:  Hypertension, seasonal allergies  POH:  Glasses for reading only, cataract surgery 11/2023, glaucoma suspect, no surgery, no trauma, posterior vitreous detachment right eye 1/29/24  Oc Meds:  None (latan in the past at [facility] [place] taken off)  occasional artificial tear drops -rare  FH:  Denies any glaucoma, age related macular degeneration, or other known eye diseases     Assessment/Plan:  1. Glaucoma suspect of both eyes - Both Eyes    2. Pseudophakia, both eyes - Both Eyes    3. Dry eye syndrome of right eye - Right Eye    4. Posterior vitreous detachment of right eye - Right Eye      (H40.003) Glaucoma suspect of both eyes  (primary encounter diagnosis)  Comment: based on cup to disc asymmetry,  was being followed at [facility] [place] for glc since at least 2005, was on Travatan in past but was taken off in past -- K pachy: 553/552   Tmax: 20/22    HVF: Fluct in 2015, 2016     CDR: 0.3/0.5    HRT/OCT: RNFL WNL      FHX of Glc:  No    Gonio: open      Intolerant to:      Asthma/COPD: No, on po BB  Steroid Use: No    Kidney Stones: No    Sulfa Allergy:No      IOP targets:L20s -- IOP good today off drops  Plan: OCT Optic Nerve RNFL Spectralis OU normal see report  Visual field some defect likely artifact, repeat 1 year, consider longer interval for testing      (Z96.1) Pseudophakia " of both eyes - Both Eyes  Comment: great result and UCVA  Plan:continue over the counter readers  +2.50     Dry eye right eye   Comment: moderate with cornea signs, no symptoms   Night Lagophthalmos?  Plan:  recommend artificial tear gel drop at bedtime   Artificial tear drops four times a day both eyes as needed     (H43.811) PVD (posterior vitreous detachment), right eye - right eye   Comment: onset 1/29/24, stable today no vitreous hemorrhage/pigment, retinal tears, or detachment seen   Plan:  Natural history of posterior vitreous detachment as well as retinal tear/detachment symptoms discussed with patient.  Told to call for prompt dilated exam if these symptoms occur.        Return in about 1 year (around 9/5/2025) for CEE :OCT nerve (RNFL) OU, Octopus 24-2, Follow-up glaucoma suspect, pseudophakia. .       Complete documentation of historical and exam elements from today's encounter can be found in the full encounter summary report (not reduplicated in this progress note). I personally obtained the chief complaint(s) and history of present illness.  I have confirmed and edited as necessary the CC, HPI, PMH/PSH, social history, FMH, ROS, and exam/neuro findings as obtained by the technician or others. I have examined this patient myself and I personally viewed the image(s) and studies listed above and the documentation reflects my findings and interpretation.  I formulated and edited as necessary the assessment and plan and discussed the findings and management plan with the patient and family.     Balbina Aguilar MD

## 2024-09-05 NOTE — NURSING NOTE
"Chief Complaints and History of Present Illnesses   Patient presents with    COMPREHENSIVE EYE EXAM     Chief Complaint(s) and History of Present Illness(es)       COMPREHENSIVE EYE EXAM              Laterality: both eyes    Associated symptoms: floaters (\"rarley\" per pt).  Negative for dryness, eye pain, tearing and flashes    Treatments tried: no treatments    Pain scale: 0/10              Comments    Pt is using OTC reading gls for near vision, +2.50 and states they work well for her. Her for comprehensive eye exam today, she has no concerns     Sandy LAW September 5, 2024 9:12 AM                       "

## 2024-09-12 ENCOUNTER — MYC MEDICAL ADVICE (OUTPATIENT)
Dept: FAMILY MEDICINE | Facility: CLINIC | Age: 79
End: 2024-09-12
Payer: COMMERCIAL

## 2024-09-12 DIAGNOSIS — E87.6 LOW BLOOD POTASSIUM: Primary | ICD-10-CM

## 2024-09-12 NOTE — TELEPHONE ENCOUNTER
Dr Wilkerson    See pt Mycrt message, did you wish him to do another, -Potassium is decreased.  ADVISE: rechecking this in 1 month. Last lab was 6/25/24    Fina Christianson RN   [facility]

## 2024-09-13 NOTE — TELEPHONE ENCOUNTER
Ordered lab to recheck potassium  I am not sure that is causing her symptoms - since one arm it might be worth an evaluation to see if any additional tests needed.  I think she should be seen in the next 1-2 weeks.    COVID and flu - Anytime now in the next few weeks unless she had COVID infection (then she can wait 2-3 months for COVID booster)    Thanks  PN

## 2024-09-17 ENCOUNTER — OFFICE VISIT (OUTPATIENT)
Dept: FAMILY MEDICINE | Facility: CLINIC | Age: 79
End: 2024-09-17
Payer: COMMERCIAL

## 2024-09-17 VITALS
DIASTOLIC BLOOD PRESSURE: 56 MMHG | RESPIRATION RATE: 16 BRPM | HEART RATE: 56 BPM | OXYGEN SATURATION: 97 % | HEIGHT: 65 IN | TEMPERATURE: 97.7 F | SYSTOLIC BLOOD PRESSURE: 118 MMHG | WEIGHT: 176.9 LBS | BODY MASS INDEX: 29.47 KG/M2

## 2024-09-17 DIAGNOSIS — E87.6 LOW BLOOD POTASSIUM: ICD-10-CM

## 2024-09-17 DIAGNOSIS — R20.0 NUMBNESS AND TINGLING IN LEFT ARM: Primary | ICD-10-CM

## 2024-09-17 DIAGNOSIS — R20.2 NUMBNESS AND TINGLING IN LEFT ARM: Primary | ICD-10-CM

## 2024-09-17 DIAGNOSIS — R73.09 ELEVATED GLUCOSE: ICD-10-CM

## 2024-09-17 LAB
ANION GAP SERPL CALCULATED.3IONS-SCNC: 13 MMOL/L (ref 7–15)
BUN SERPL-MCNC: 14.2 MG/DL (ref 8–23)
CALCIUM SERPL-MCNC: 9.3 MG/DL (ref 8.8–10.4)
CHLORIDE SERPL-SCNC: 103 MMOL/L (ref 98–107)
CREAT SERPL-MCNC: 0.98 MG/DL (ref 0.51–0.95)
EGFRCR SERPLBLD CKD-EPI 2021: 59 ML/MIN/1.73M2
GLUCOSE SERPL-MCNC: 98 MG/DL (ref 70–99)
HBA1C MFR BLD: 5.6 % (ref 0–5.6)
HCO3 SERPL-SCNC: 24 MMOL/L (ref 22–29)
POTASSIUM SERPL-SCNC: 3.5 MMOL/L (ref 3.4–5.3)
SODIUM SERPL-SCNC: 140 MMOL/L (ref 135–145)
TSH SERPL DL<=0.005 MIU/L-ACNC: 2.12 UIU/ML (ref 0.3–4.2)

## 2024-09-17 PROCEDURE — 80048 BASIC METABOLIC PNL TOTAL CA: CPT | Performed by: FAMILY MEDICINE

## 2024-09-17 PROCEDURE — 99214 OFFICE O/P EST MOD 30 MIN: CPT | Performed by: FAMILY MEDICINE

## 2024-09-17 PROCEDURE — 86618 LYME DISEASE ANTIBODY: CPT | Performed by: FAMILY MEDICINE

## 2024-09-17 PROCEDURE — 84443 ASSAY THYROID STIM HORMONE: CPT | Performed by: FAMILY MEDICINE

## 2024-09-17 PROCEDURE — 82607 VITAMIN B-12: CPT | Performed by: FAMILY MEDICINE

## 2024-09-17 PROCEDURE — 36415 COLL VENOUS BLD VENIPUNCTURE: CPT | Performed by: FAMILY MEDICINE

## 2024-09-17 PROCEDURE — 83036 HEMOGLOBIN GLYCOSYLATED A1C: CPT | Performed by: FAMILY MEDICINE

## 2024-09-17 ASSESSMENT — PAIN SCALES - GENERAL: PAINLEVEL: NO PAIN (0)

## 2024-09-17 NOTE — PROGRESS NOTES
"  Assessment & Plan     Numbness and tingling in left arm  Left arm numbness now for 2 weeks   Unclear etiology   Diff dx includes carpel tunnel but all fingers up to elbow involved.  Other thought is from the c-spine -   Will come and go but nothing she is aware that makes worse or better  Plan to check EMG  She had MRI neck in 2020 and may need to repeat  Pt will call or RTC if symptoms worsen or do not improve.   - TSH with free T4 reflex; Future  - Vitamin B12; Future  - LYME DISEASE TOTAL ANTIBODIES WITH REFLEX TO CONFIRMATION; Future  - EMG; Future  - TSH with free T4 reflex  - Vitamin B12  - LYME DISEASE TOTAL ANTIBODIES WITH REFLEX TO CONFIRMATION    Elevated glucose    - Hemoglobin A1c; Future  - Hemoglobin A1c    Low blood potassium     - Basic metabolic panel  (Ca, Cl, CO2, Creat, Gluc, K, Na, BUN)                Laal Larios is a 79 year old, presenting for the following health issues:  Vascular Disease        9/17/2024     9:46 AM   Additional Questions   Roomed by ANATOLIY Clinetice   Accompanied by n/a     History of Present Illness       Vascular Disease:  She presents for follow up of vascular disease.     She never takes nitroglycerin. She takes daily aspirin.    Reason for visit:  Left Arm tingling, sometimes right arm  : \"a while\"  Symptoms include:  Feels like arm is falling asleep, tingling, numbness, weaker, sore  Symptom intensity:  Moderate  Symptom progression:  Staying the same  What makes it worse:  Sitting too long  What makes it better:  Activity, jostling it around    She eats 0-1 servings of fruits and vegetables daily.She consumes 1 sweetened beverage(s) daily.She exercises with enough effort to increase her heart rate 9 or less minutes per day.  She exercises with enough effort to increase her heart rate 3 or less days per week. She is missing 1 dose(s) of medications per week.  She is not taking prescribed medications regularly due to remembering to take.     Right " "handed   Left arm numbness and tingling that comes and goes   Symptoms for past few months   Feels like tingling from tip of fingers to the elbow  Feels like the entire hand   No aggrevating factors other than sitting still   Will come and go pretty quick -   Pressing under the left arm she will feels like she can reproduce   Not clear if night time symptoms    No foot symptoms  No headache or vision changes                Review of Systems  Constitutional, HEENT, cardiovascular, pulmonary, GI, , musculoskeletal, neuro, skin, endocrine and psych systems are negative, except as otherwise noted.      Objective    /56 (BP Location: Left arm, Patient Position: Sitting, Cuff Size: Adult Regular)   Pulse 56   Temp 97.7  F (36.5  C) (Temporal)   Resp 16   Ht 1.651 m (5' 5\")   Wt 80.2 kg (176 lb 14.4 oz)   SpO2 97%   BMI 29.44 kg/m    Body mass index is 29.44 kg/m .  Physical Exam   GENERAL: alert and no distress  SKIN: no suspicious lesions or rashes  NEURO: Normal strength and tone, sensory exam grossly normal, and mentation intact  BACK: no CVA tenderness, no paralumbar tenderness    Results for orders placed or performed in visit on 09/17/24 (from the past 24 hour(s))   Basic metabolic panel  (Ca, Cl, CO2, Creat, Gluc, K, Na, BUN)   Result Value Ref Range    Sodium 140 135 - 145 mmol/L    Potassium 3.5 3.4 - 5.3 mmol/L    Chloride 103 98 - 107 mmol/L    Carbon Dioxide (CO2) 24 22 - 29 mmol/L    Anion Gap 13 7 - 15 mmol/L    Urea Nitrogen 14.2 8.0 - 23.0 mg/dL    Creatinine 0.98 (H) 0.51 - 0.95 mg/dL    GFR Estimate 59 (L) >60 mL/min/1.73m2    Calcium 9.3 8.8 - 10.4 mg/dL    Glucose 98 70 - 99 mg/dL   TSH with free T4 reflex   Result Value Ref Range    TSH 2.12 0.30 - 4.20 uIU/mL   Hemoglobin A1c   Result Value Ref Range    Hemoglobin A1C 5.6 0.0 - 5.6 %           Signed Electronically by: Jolanta Wilkerson,     "

## 2024-09-18 ENCOUNTER — TELEPHONE (OUTPATIENT)
Dept: FAMILY MEDICINE | Facility: CLINIC | Age: 79
End: 2024-09-18
Payer: COMMERCIAL

## 2024-09-18 LAB
B BURGDOR IGG+IGM SER QL: 0.04
VIT B12 SERPL-MCNC: 228 PG/ML (ref 232–1245)

## 2024-09-18 NOTE — RESULT ENCOUNTER NOTE
Dear Harriet,   Still awaiting lyme test but want to let you know the vitamin B12 is low!  This could explain some of your symptoms.  Please start an over the counter vitamin B12 supplement 500-1000mcg take daily. Plan to recheck a lab test in 4 weeks to make sure the B12 is increasing and you are absorbing the vitamin.    We can hold on the EMG to see if this helps with your symptoms?  Maybe schedule the procedure 2+ months out so we can cancel if they get better with the B12.   Let us know if you have any questions.  -Jolanta Wilkerson, DO

## 2024-10-15 ENCOUNTER — MYC MEDICAL ADVICE (OUTPATIENT)
Dept: FAMILY MEDICINE | Facility: CLINIC | Age: 79
End: 2024-10-15
Payer: COMMERCIAL

## 2024-10-21 ENCOUNTER — IMMUNIZATION (OUTPATIENT)
Dept: FAMILY MEDICINE | Facility: CLINIC | Age: 79
End: 2024-10-21
Payer: COMMERCIAL

## 2024-10-21 VITALS — TEMPERATURE: 98.1 F

## 2024-10-21 DIAGNOSIS — Z23 ENCOUNTER FOR IMMUNIZATION: Primary | ICD-10-CM

## 2024-10-21 PROCEDURE — 90662 IIV NO PRSV INCREASED AG IM: CPT

## 2024-10-21 PROCEDURE — 99207 PR NO CHARGE NURSE ONLY: CPT

## 2024-10-21 PROCEDURE — 91320 SARSCV2 VAC 30MCG TRS-SUC IM: CPT

## 2024-10-21 PROCEDURE — G0008 ADMIN INFLUENZA VIRUS VAC: HCPCS

## 2024-10-21 PROCEDURE — 90480 ADMN SARSCOV2 VAC 1/ONLY CMP: CPT

## 2024-10-21 NOTE — PROGRESS NOTES
Pt received COVID vaccine and flu vaccine per standing orders. Pt given VIS forms prior to immunization administration.   Prior to immunization administration, verified patients identity using patient s name and date of birth. Please see Immunization Activity reviewed prior to immunization administration for additional information.     Is the patient's temperature normal (100.5 or less)? Yes     Patient MEETS CRITERIA. PROCEED with vaccine administration.        10/21/2024   General Questionnaire    Do you have any questions for your care team about the vaccines you will be receiving today? no                10/21/2024   COVID   Have you had myocarditis or pericarditis (inflammation of or around the heart muscle) after getting a COVID-19 vaccine? No   Have you had a serious reaction to a COVID vaccine or something in a COVID vaccine, like polyethylene glycol (PEG) or polysorbate? No   Have you had multisystem inflammatory syndrome from COVID-19 in the past 90 days? No   Have you received a bone marrow transplant within the previous 3 months? No            Patient MEETS CRITERIA. PROCEED with vaccine administration.            10/21/2024   INFLUENZA   Would you like to receive the flu shot or the nasal flu vaccine today? Flu Shot   Have you had a serious reaction to a flu vaccine or something in a flu vaccine? No   Have you had Guillain-Broadus syndrome within 6 weeks of getting a vaccine? No   Have you received a bone marrow transplant within the previous 6 months? No            Patient MEETS CRITERIA. PROCEED with vaccine administration.        Patient instructed to remain in clinic for 15 minutes afterwards, and to report any adverse reactions.      Link to Ancillary Visit Immunization Standing Orders SmartSet     Performed by Rohit Jaffe RN on 10/21/2024.

## 2024-12-08 NOTE — PROGRESS NOTES
"Subjective     Harriet Lance is a 74 year old female who presents to clinic today for the following health issues:    HPI   Hypertension Follow-up- also, please see RN Triage note from yesterday- BP yesterday was 149/68      Do you check your blood pressure regularly outside of the clinic? Yes -once or twice a week.    Are you following a low salt diet? Yes    Are your blood pressures ever more than 140 on the top number (systolic) OR more   than 90 on the bottom number (diastolic), for example 140/90? Yes    Amount of exercise or physical activity: nothing routine, but does stay active    Problems taking medications regularly: No    Medication side effects: none    Diet: regular (no restrictions)    Yesterday woke up feeling dizzy -   Was feeling woozy - finally able to get up and try to get ready  Felt very uncomfortable  Was worried she had CO poisoning in her house - she called 911   No leak found but her blood pressure was very high /100    Taking the metoprolol at night before bedtime.    Take hydrochlorothiazide in the morning -               Reviewed and updated as needed this visit by Provider  Meds  Problems         Review of Systems         Objective    /62   Pulse 52   Temp 98.5  F (36.9  C) (Oral)   Resp 15   Ht 1.638 m (5' 4.5\")   Wt 86.2 kg (190 lb)   SpO2 98%   BMI 32.11 kg/m    Body mass index is 32.11 kg/m .  Physical Exam               Assessment & Plan     1. Hypertension goal BP (blood pressure) < 140/90  Blood pressure - was very high and was symptomatic  Concern that today BP is perfect at 123/62  Concern that she could have possibly missed her dose of BP med  Will keep meds the same - discussed strategies so she is aware she took her pills daily - like pill box  Will check labs today  Pt will call or RTC if symptoms worsen or do not improve.   - Comprehensive metabolic panel (BMP + Alb, Alk Phos, ALT, AST, Total. Bili, TP)    2. Dizziness  Resolved with improved BP " "  - Comprehensive metabolic panel (BMP + Alb, Alk Phos, ALT, AST, Total. Bili, TP)     BMI:   Estimated body mass index is 32.11 kg/m  as calculated from the following:    Height as of this encounter: 1.638 m (5' 4.5\").    Weight as of this encounter: 86.2 kg (190 lb).   Weight management plan: Discussed healthy diet and exercise guidelines        Pt will call or RTC if symptoms worsen or do not improve.      No follow-ups on file.    Jolanta Wilkerson, DO  [facility]      " .

## 2024-12-11 DIAGNOSIS — I10 HYPERTENSION GOAL BP (BLOOD PRESSURE) < 140/90: ICD-10-CM

## 2024-12-11 RX ORDER — METOPROLOL SUCCINATE 50 MG/1
TABLET, EXTENDED RELEASE ORAL
Qty: 90 TABLET | Refills: 2 | Status: SHIPPED | OUTPATIENT
Start: 2024-12-11

## 2024-12-12 ENCOUNTER — OFFICE VISIT (OUTPATIENT)
Dept: NEUROLOGY | Facility: CLINIC | Age: 79
End: 2024-12-12
Attending: FAMILY MEDICINE
Payer: COMMERCIAL

## 2024-12-12 DIAGNOSIS — R20.2 NUMBNESS AND TINGLING IN LEFT ARM: ICD-10-CM

## 2024-12-12 DIAGNOSIS — R20.0 NUMBNESS AND TINGLING IN LEFT ARM: ICD-10-CM

## 2024-12-12 NOTE — LETTER
12/12/2024       RE: Harriet Lance  [address] [id number]     Dear Colleague,    Thank you for referring your patient, Harriet Lance, to the [facility] EMG CLINIC [facility]. Please see a copy of my visit note below.    Please see procedure note.      Again, thank you for allowing me to participate in the care of your patient.      Sincerely,    Lisha James MD

## 2024-12-12 NOTE — PROCEDURES
[facility]  Electrodiagnostic Laboratory                 Department of Neurology                                                                                                         Test Date:  2024    Patient: Harriet Lacne : 1945 Physician: Lisha James MD   Sex: Female AGE: 79 year Ref Phys: Jolanta Wilkerson DO   ID#: [id number]   Technician: Sherita Caldwell     History and Examination:  79-year-old right-handed female presented with intermittent numbness and tingling in the left arm, left forearm, and left hand in all fingers.  She denies any neck pain or radiating pain.  This study was performed to assess for cervical radiculopathy versus mononeuropathy of the left upper limb.    Techniques:  Motor and sensory conduction studies were done with surface recording electrodes.  Temperature was monitored and recorded throughout the study. Upper extremities were maintained at a temperature of 32 degrees Centigrade or higher.  EMG was done with a concentric needle electrode.     Results:  Nerve conduction studies of the left upper limb showed slightly prolonged left median sensory peak latency.  Left radial and ulnar sensory nerve action potentials were normal.  Left median and left ulnar compound muscle action potentials were normal.  Left median-ulnar palmar latency difference was mildly prolonged.    Needle EMG of the left upper limb and cervical paraspinal muscles were normal.    Interpretation:  This is a minimally abnormal study.  There is electrophysiologic evidence of a left median neuropathy at the wrist as can be seen in carpal tunnel syndrome.  There is no electrophysiologic evidence of a left cervical radiculopathy in the current study.    Lisha James MD  Department of Neurology        Nerve Conduction Studies  Motor Sites      Latency Neg. Amp Neg. Amp Diff Segment Distance Velocity Neg. Dur Neg Area Diff Temperature Comment   Site (ms) Norm  (mV) Norm (%)  cm m/s Norm (ms) (%) ( C)    Left Median (APB) Motor   Wrist 3.7  < 4.4 12.4  > 5.0  Wrist-APB 8   4.8  29.2    Elbow 7.4 - 11.8  > 5.0 -5 Elbow-Wrist 21.2 57  > 48 4.9 -8 29.9    Left Ulnar (ADM) Motor   Wrist 3.0  < 3.5 8.7  > 5.0  Wrist-ADM 8   6.2  29.8    Below Elbow 6.4 - 7.7 - -11 Below Elbow-Wrist 19.3 57  > 48 6.6 -6 29.9    Above Elbow 7.8 - 7.3 - -5 Above Elbow-Below Elbow 8.9 64  > 48 6.8 1 30      F-Wave Sites      Min F-Lat Max-Min F-Lat Mean F-Lat   Site (ms) (ms) (ms)   Left Median F-Wave   Wrist 27.2 2.1 28.3   Left Ulnar F-Wave   Wrist 26.7 4.6 28.7     Sensory Sites      Onset Lat Peak Lat Amp (O-P) Amp (P-P) Segment Distance Velocity Temperature Comment   Site ms (ms)  V Norm ( V)  cm m/s Norm ( C)    Left Median Sensory   Wrist 2.9 3.9 27  > 10 36 Wrist-Dig II 14 48  > 48 30    Left Median-Ulnar Palmar Sensory        Median   Palm 1.93 2.6 45 - 68 Palm-Wrist 8 41 - 28.4         Ulnar   Palm 1.55 2.1 14 - 25 Palm-Wrist - - - 29    Left Radial Sensory   Forearm-Wrist 1.48 2.1 27  > 15 29 Forearm-Wrist 10 68 - 30.3    Left Ulnar Sensory   Wrist 2.2 3.1 31  > 8 52 Wrist-Dig V 12.5 57  > 48 30      Inter-Nerve Comparisons     Nerve 1 Value 1 Nerve 2 Value 2 Parameter Result Normal   Sensory Sites   L Median Palm-Wrist 2.6 ms L Ulnar Palm-Wrist 2.1 ms Peak Lat Diff 0.50 ms <0.40       Electromyography     Side Muscle Ins Act Fibs/PSW Fasc HF Amp Dur Poly Recrt Int Pat   Left FDI Nml None Nml 0 Nml Nml 0 Nml Nml   Left Pronator Teres Nml None Nml 0 Nml Nml 0 Nml Nml   Left Triceps Nml None Nml 0 Nml Nml 0 Nml Nml   Left Deltoid Nml None Nml 0 Nml Nml 0 Nml Nml   Left Cervical PSP Nml None Nml 0 Nml Nml 0 Nml Nml         NCS Waveforms:    Motor         Sensory                F-Wave

## 2024-12-14 ENCOUNTER — MYC MEDICAL ADVICE (OUTPATIENT)
Dept: FAMILY MEDICINE | Facility: CLINIC | Age: 79
End: 2024-12-14
Payer: COMMERCIAL

## 2024-12-16 ENCOUNTER — MYC REFILL (OUTPATIENT)
Dept: FAMILY MEDICINE | Facility: CLINIC | Age: 79
End: 2024-12-16
Payer: COMMERCIAL

## 2024-12-16 ENCOUNTER — MYC MEDICAL ADVICE (OUTPATIENT)
Dept: FAMILY MEDICINE | Facility: CLINIC | Age: 79
End: 2024-12-16
Payer: COMMERCIAL

## 2024-12-16 DIAGNOSIS — E78.5 HYPERLIPIDEMIA WITH TARGET LDL LESS THAN 130: ICD-10-CM

## 2024-12-16 DIAGNOSIS — I10 HYPERTENSION GOAL BP (BLOOD PRESSURE) < 140/90: ICD-10-CM

## 2024-12-16 RX ORDER — METOPROLOL SUCCINATE 50 MG/1
TABLET, EXTENDED RELEASE ORAL
Qty: 90 TABLET | Refills: 2 | OUTPATIENT
Start: 2024-12-16

## 2024-12-16 RX ORDER — PRAVASTATIN SODIUM 40 MG
40 TABLET ORAL DAILY
Qty: 90 TABLET | Refills: 3 | OUTPATIENT
Start: 2024-12-16

## 2024-12-16 NOTE — TELEPHONE ENCOUNTER
PN,  Please see below MyChart message and advise.  Message in regards to recommendations provided in EMG result note  Thanks,  Bianka QUINTANILLA RN

## 2024-12-16 NOTE — TELEPHONE ENCOUNTER
"Patient sent separate [employer] message today 12/16 regarding this request with the following note:  \"I m going out of town and the three meds ordered will not make it through entire trip. This is reason for order.      FYI- regarding the Metorprolol, I take a half mornings, and a hold pill at night. This might be confusing for insurance company. [employer] is holding for approval .\"    Bianka QUINTANILLA RN    "

## 2024-12-17 RX ORDER — LOSARTAN POTASSIUM 25 MG/1
25 TABLET ORAL DAILY
Qty: 90 TABLET | Refills: 1 | Status: SHIPPED | OUTPATIENT
Start: 2024-12-17

## 2024-12-26 ENCOUNTER — PATIENT OUTREACH (OUTPATIENT)
Dept: CARE COORDINATION | Facility: CLINIC | Age: 79
End: 2024-12-26
Payer: COMMERCIAL

## 2025-01-14 ENCOUNTER — ANCILLARY PROCEDURE (OUTPATIENT)
Dept: GENERAL RADIOLOGY | Facility: CLINIC | Age: 80
End: 2025-01-14
Attending: FAMILY MEDICINE
Payer: COMMERCIAL

## 2025-01-14 ENCOUNTER — OFFICE VISIT (OUTPATIENT)
Dept: FAMILY MEDICINE | Facility: CLINIC | Age: 80
End: 2025-01-14
Attending: FAMILY MEDICINE
Payer: COMMERCIAL

## 2025-01-14 VITALS
HEIGHT: 65 IN | HEART RATE: 78 BPM | DIASTOLIC BLOOD PRESSURE: 60 MMHG | OXYGEN SATURATION: 98 % | RESPIRATION RATE: 19 BRPM | TEMPERATURE: 97.2 F | BODY MASS INDEX: 29.32 KG/M2 | SYSTOLIC BLOOD PRESSURE: 124 MMHG | WEIGHT: 176 LBS

## 2025-01-14 DIAGNOSIS — M54.6 MIDLINE THORACIC BACK PAIN, UNSPECIFIED CHRONICITY: Primary | ICD-10-CM

## 2025-01-14 DIAGNOSIS — I10 HYPERTENSION GOAL BP (BLOOD PRESSURE) < 140/90: ICD-10-CM

## 2025-01-14 DIAGNOSIS — M54.6 MIDLINE THORACIC BACK PAIN, UNSPECIFIED CHRONICITY: ICD-10-CM

## 2025-01-14 DIAGNOSIS — E53.8 VITAMIN B12 DEFICIENCY (NON ANEMIC): ICD-10-CM

## 2025-01-14 LAB — VIT B12 SERPL-MCNC: 969 PG/ML (ref 232–1245)

## 2025-01-14 PROCEDURE — 72070 X-RAY EXAM THORAC SPINE 2VWS: CPT | Mod: TC | Performed by: RADIOLOGY

## 2025-01-14 PROCEDURE — 82607 VITAMIN B-12: CPT | Performed by: FAMILY MEDICINE

## 2025-01-14 PROCEDURE — 99214 OFFICE O/P EST MOD 30 MIN: CPT | Performed by: FAMILY MEDICINE

## 2025-01-14 PROCEDURE — 36415 COLL VENOUS BLD VENIPUNCTURE: CPT | Performed by: FAMILY MEDICINE

## 2025-01-14 RX ORDER — METOPROLOL SUCCINATE 50 MG/1
TABLET, EXTENDED RELEASE ORAL
Qty: 135 TABLET | Refills: 2 | Status: SHIPPED | OUTPATIENT
Start: 2025-01-14

## 2025-01-14 ASSESSMENT — PAIN SCALES - GENERAL: PAINLEVEL_OUTOF10: NO PAIN (0)

## 2025-01-14 NOTE — RESULT ENCOUNTER NOTE
Dear Harriet,   Your test results are all back -   Your B12 is back in the normal range - I suspect you were taking a supplement and you are absorbing it.  Let us know if you have any questions.  -Jolanta Wilkerson, DO

## 2025-01-14 NOTE — PATIENT INSTRUCTIONS
For your low B12 level -   Start over the counter Vitamin B12 500-1000 mcg daily     If still low after one month of taking will start B12 injection    This could help the numbness and tingling in your arms

## 2025-01-14 NOTE — PROGRESS NOTES
"  Assessment & Plan     Hypertension goal BP (blood pressure) < 140/90  BP well controlled on current regimen. She is taking more metoprolol than prescribed, however she is not having adverse side effects at this time and BP well controlled, so it is appropriate to continue the dose she is currently taking (50 mg at night, 25 mg in the morning). Otherwise appropriate to continue other antihypertensives at current dose (amlodipine 10 mg, hydrochlorothiazide 25 mg, losartan 25 mg).  - metoprolol succinate ER (TOPROL XL) 50 MG 24 hr tablet  Dispense: 135 tablet; Refill: 2    Midline thoracic back pain, unspecified chronicity  Occasional pain in the upper thoracic spine. Somewhat related to stress. Has bony tenderness on thoracic spinous process on exam. Differential diagnosis includes compression fracture, osteoarthritis, muscle cramping/tightness, or atypical angina. Cardiac etiology less likely given no symptoms with exertion, no other associated cardiac symptoms. More likely etiology is musculoskeletal given bony spinal tenderness, warrants further evaluation with XR thoracic spine.   - XR Thoracic Spine 2 Views  - Follow up if pain worsens    Vitamin B12 deficiency (non anemic)  History of B12 deficiency. Currently has some peripheral neuropathy in upper extremities, unclear if currently taking recommended B12 supplement. Warrants further evaluation with B12 level.   - Asked patient to confirm if she is taking B12, if she is and B12 levels low today, consider B12 injections in the future.  - Vitamin B12 500-1000mcg PO daily  - Vitamin B12 level      BMI  Estimated body mass index is 29.29 kg/m  as calculated from the following:    Height as of this encounter: 1.651 m (5' 5\").    Weight as of this encounter: 79.8 kg (176 lb).             Lala Larios is a 79 year old, presenting for the following health issues:  Hypertension        1/14/2025     9:07 AM   Additional Questions   Roomed by AJIT DIAZ"   Accompanied by STEVE         1/14/2025     9:07 AM   Patient Reported Additional Medications   Patient reports taking the following new medications N/A     History of Present Illness       Hypertension: She presents for follow up of hypertension.  She does not check blood pressure  regularly outside of the clinic. Outside blood pressures have been over 140/90. She follows a low salt diet.     Vascular Disease:  She presents for follow up of vascular disease.     She never takes nitroglycerin. She takes daily aspirin.    Reason for visit:  Test results, circulation in arms, next steps.    She eats 0-1 servings of fruits and vegetables daily.She consumes 2 sweetened beverage(s) daily.She exercises with enough effort to increase her heart rate 10 to 19 minutes per day.  She exercises with enough effort to increase her heart rate 4 days per week. She is missing 1 dose(s) of medications per week.  She is not taking prescribed medications regularly due to remembering to take.     Harriet is a 79 yr old F presenting for follow up with hypertension management, neuropathy. She reports taking her medications as prescribed, and has adjusted timing of medications as she used to become occasionally lightheaded at times. She started taking a full metoprolol at night and 1/2 pill in the morning (prescription written as 1/2 morning, 1/2 evening) due to occasional lightheadedness, this has improved somewhat over time. She does not take bp readings at home.     She reports numbness and tingling in both arms, left arm worse than right. She has tingling from her shoulder to her fingertips, much worse in her hands and fingers. She wears a brace at night for both hands and has started doing exercises she found on social media. There has been some mild improvement. She is not sure if she is taking the vitamin B12 that was recommended prior.     Occasionally she has pain in her upper back and heartburn symptoms. The back pain is generally  "worse with stress, but she has not noticed any arm symptoms, heartburn, or back pain related to one another or with exertion. Generally all symptoms get better on there own after a short period of time. She has no shortness of breath, excessive sweating, jaw pain, or palpitations with any of these symptoms.              Review of Systems  Constitutional, neuro, ENT, endocrine, pulmonary, cardiac, gastrointestinal, genitourinary, musculoskeletal, integument and psychiatric systems are negative, except as otherwise noted.      Objective    /60 (BP Location: Left arm, Patient Position: Sitting, Cuff Size: Adult Regular)   Pulse 78   Temp 97.2  F (36.2  C) (Temporal)   Resp 19   Ht 1.651 m (5' 5\")   Wt 79.8 kg (176 lb)   SpO2 98%   BMI 29.29 kg/m    Body mass index is 29.29 kg/m .  Physical Exam   GENERAL: alert and no distress  NECK: no adenopathy, no asymmetry, masses, or scars  RESP: lungs clear to auscultation - no rales, rhonchi or wheezes  CV: regular rate and rhythm, normal S1 S2, no S3 or S4, no murmur, click or rub, no peripheral edema  MS: no gross musculoskeletal defects noted, no edema  Comprehensive back pain exam:  Tenderness of spinous process in thoracic vertebrae at approximately T4    Results for orders placed or performed in visit on 01/14/25 (from the past 24 hours)   Vitamin B12   Result Value Ref Range    Vitamin B12 969 232 - 1,245 pg/mL         Thoracic xray -   Degenerative chagnes but no compression fracture noted     Physician Attestation   I, Jolanta Wilkerson DO, was present with the medical/MAK student who participated in the service and in the documentation of the note.  I have verified the history and personally performed the physical exam and medical decision making.  I agree with the assessment and plan of care as documented in the note.      Items personally reviewed: vitals and EMG results and agree with the interpretation documented in the note.    Jolanta Wilkerson, "     -Kvng Mckeon, MS3  [facility]    Signed Electronically by: Jolanta Wilkerson DO

## 2025-01-27 ENCOUNTER — ANCILLARY PROCEDURE (OUTPATIENT)
Dept: MAMMOGRAPHY | Facility: CLINIC | Age: 80
End: 2025-01-27
Attending: FAMILY MEDICINE
Payer: COMMERCIAL

## 2025-01-27 DIAGNOSIS — Z12.31 VISIT FOR SCREENING MAMMOGRAM: ICD-10-CM

## 2025-01-27 PROCEDURE — 77063 BREAST TOMOSYNTHESIS BI: CPT | Mod: GC | Performed by: STUDENT IN AN ORGANIZED HEALTH CARE EDUCATION/TRAINING PROGRAM

## 2025-01-27 PROCEDURE — 77067 SCR MAMMO BI INCL CAD: CPT | Mod: GC | Performed by: STUDENT IN AN ORGANIZED HEALTH CARE EDUCATION/TRAINING PROGRAM

## 2025-02-03 DIAGNOSIS — I10 HYPERTENSION GOAL BP (BLOOD PRESSURE) < 140/90: ICD-10-CM

## 2025-02-04 RX ORDER — AMLODIPINE BESYLATE 10 MG/1
10 TABLET ORAL DAILY
Qty: 90 TABLET | Refills: 1 | Status: SHIPPED | OUTPATIENT
Start: 2025-02-04

## 2025-02-10 ENCOUNTER — NURSE TRIAGE (OUTPATIENT)
Dept: FAMILY MEDICINE | Facility: CLINIC | Age: 80
End: 2025-02-10
Payer: COMMERCIAL

## 2025-02-13 NOTE — TELEPHONE ENCOUNTER
PN and MP ([facility]),      Patient calling back regarding [employer] message sent  Patient had near syncopal episode on Monday 2/10  States today she is feeling normal, no symptoms of concern  She thinks episode may have been linked to dehydration as she had a lot of salty foods at the super bowl  Patient has experienced 2 other similar episodes within the last year- most recent was in January, other was 6-7 months ago  Patient has been monitoring her BP since episode occurred   BP readings as below:      Tuesday 10am 129/61 hr 59  Wednesday morning  116/76 hr 69  Wednesday evening 152/65 hr 60  Thursday (today) morning before meds 155/69 hr 55  Today after medications 130/69 hr 59    Nurse assisted patient with scheduling follow up to discuss BP medication per patient request, scheduled visit per patient availability for 2/17  Provided care advice and advised when to call back an/or go in  Please advise on if okay to wait until 2/17 for follow up    Bianka Rodriguez RN    Reason for Disposition   Taking a medicine that could cause dizziness (e.g., blood pressure medications, diuretics)    Additional Information   Negative: SEVERE difficulty breathing (e.g., struggling for each breath, speaks in single words)   Negative: Shock suspected (e.g., cold/pale/clammy skin, too weak to stand, low BP, rapid pulse)   Negative: Difficult to awaken or acting confused (e.g., disoriented, slurred speech)   Negative: Fainted, and still feels dizzy afterwards   Negative: Overdose (accidental or intentional) of medications   Negative: New neurologic deficit that is present now: * Weakness of the face, arm, or leg on one side of the body * Numbness of the face, arm, or leg on one side of the body * Loss of speech or garbled speech   Negative: Heart beating < 50 beats per minute OR > 140 beats per minute   Negative: Sounds like a life-threatening emergency to the triager   Negative: Chest pain   Negative: Rectal bleeding, bloody stool, or  tarry-black stool   Negative: Vomiting is main symptom   Negative: Diarrhea is main symptom   Negative: Headache is main symptom   Negative: Heat exhaustion suspected (i.e., dehydration from heat exposure)   Negative: Patient states that they are having an anxiety or panic attack   Negative: Dizziness from low blood sugar (i.e., < 60 mg/dl or 3.5 mmol/l)   Negative: SEVERE dizziness (e.g., unable to stand, requires support to walk, feels like passing out now)   Negative: SEVERE headache or neck pain   Negative: Spinning or tilting sensation (vertigo) present now and one or more stroke risk factors (i.e., hypertension, diabetes mellitus, prior stroke/TIA, heart attack, age over 60) (Exception: Prior physician evaluation for this AND no different/worse than usual.)   Negative: Neurologic deficit that was brief (now gone), ANY of the following:* Weakness of the face, arm, or leg on one side of the body* Numbness of the face, arm, or leg on one side of the body* Loss of speech or garbled speech   Negative: Loss of vision or double vision  (Exception: Similar to previous migraines.)   Negative: Extra heartbeats, irregular heart beating, or heart is beating very fast (i.e., 'palpitations')   Negative: Difficulty breathing   Negative: Drinking very little and dehydration suspected (e.g., no urine > 12 hours, very dry mouth, very lightheaded)   Negative: Follows bleeding (e.g., stomach, rectum, vagina)  (Exception: Became dizzy from sight of small amount blood.)   Negative: Patient sounds very sick or weak to the triager   Negative: Lightheadedness (dizziness) present now, after 2 hours of rest and fluids   Negative: Spinning or tilting sensation (vertigo) present now   Negative: Fever > 103 F (39.4 C)   Negative: Fever > 100.0 F (37.8 C) and has diabetes mellitus or a weak immune system (e.g., HIV positive, cancer chemotherapy, organ transplant, splenectomy, chronic steroids)   Negative: MODERATE dizziness (e.g.,  interferes with normal activities)  (Exception: Dizziness caused by heat exposure, sudden standing, or poor fluid intake.)   Negative: Vomiting occurs with dizziness   Negative: Patient wants to be seen    Protocols used: Dizziness-A-OH

## 2025-02-17 ENCOUNTER — OFFICE VISIT (OUTPATIENT)
Dept: FAMILY MEDICINE | Facility: CLINIC | Age: 80
End: 2025-02-17
Payer: COMMERCIAL

## 2025-02-17 VITALS
DIASTOLIC BLOOD PRESSURE: 75 MMHG | OXYGEN SATURATION: 98 % | TEMPERATURE: 97.9 F | SYSTOLIC BLOOD PRESSURE: 115 MMHG | HEART RATE: 61 BPM | RESPIRATION RATE: 16 BRPM

## 2025-02-17 DIAGNOSIS — N18.30 STAGE 3 CHRONIC KIDNEY DISEASE, UNSPECIFIED WHETHER STAGE 3A OR 3B CKD (H): ICD-10-CM

## 2025-02-17 DIAGNOSIS — E78.5 HYPERLIPIDEMIA WITH TARGET LDL LESS THAN 130: ICD-10-CM

## 2025-02-17 DIAGNOSIS — R55 VASOVAGAL NEAR SYNCOPE: Primary | ICD-10-CM

## 2025-02-17 DIAGNOSIS — I10 HYPERTENSION GOAL BP (BLOOD PRESSURE) < 140/90: ICD-10-CM

## 2025-02-17 LAB
ANION GAP SERPL CALCULATED.3IONS-SCNC: 14 MMOL/L (ref 7–15)
BUN SERPL-MCNC: 14.6 MG/DL (ref 8–23)
CALCIUM SERPL-MCNC: 9.5 MG/DL (ref 8.8–10.4)
CHLORIDE SERPL-SCNC: 101 MMOL/L (ref 98–107)
CREAT SERPL-MCNC: 0.87 MG/DL (ref 0.51–0.95)
EGFRCR SERPLBLD CKD-EPI 2021: 68 ML/MIN/1.73M2
GLUCOSE SERPL-MCNC: 98 MG/DL (ref 70–99)
HCO3 SERPL-SCNC: 23 MMOL/L (ref 22–29)
POTASSIUM SERPL-SCNC: 3.3 MMOL/L (ref 3.4–5.3)
SODIUM SERPL-SCNC: 138 MMOL/L (ref 135–145)

## 2025-02-17 PROCEDURE — 99214 OFFICE O/P EST MOD 30 MIN: CPT | Performed by: FAMILY MEDICINE

## 2025-02-17 PROCEDURE — 93000 ELECTROCARDIOGRAM COMPLETE: CPT | Performed by: FAMILY MEDICINE

## 2025-02-17 PROCEDURE — 80048 BASIC METABOLIC PNL TOTAL CA: CPT | Performed by: FAMILY MEDICINE

## 2025-02-17 PROCEDURE — 36415 COLL VENOUS BLD VENIPUNCTURE: CPT | Performed by: FAMILY MEDICINE

## 2025-02-17 NOTE — PROGRESS NOTES
Assessment & Plan     1. Vasovagal near syncope (Primary)  Plan: most likely due to mild  dehydration vs hypotension.    DDX also include arrythmia, AORTIC STENOSIS,sick sinus syndrome  EKG is normal- and though arrhythmia still a possibility , its unlikely.  If recurrent symptoms- than besides Holter, and echo should also be completed to rule out aortic stenosis     Follow up with primary care physicain to discuss medications for hypertension in case , frequent near vasovagal episodes or standing up is causing lightheadedness- in that case- decreasing Blood pressure medications - is next best stop  - EKG 12-lead complete w/read - Clinics    2. Stage 3 chronic kidney disease, unspecified whether stage 3a or 3b CKD (H)  Avoid NSAID  - Basic metabolic panel  (Ca, Cl, CO2, Creat, Gluc, K, Na, BUN); Future    3. Hypertension goal BP (blood pressure) < 140/90  Plan: currently on amlodipine, Losartan, metoprolol and HCTZ  - Basic metabolic panel  (Ca, Cl, CO2, Creat, Gluc, K, Na, BUN); Future    4. Hyperlipidemia with target LDL less than 130  NO medications changes  Recent Labs   Lab Test 06/25/24  0851 06/16/23  0840   CHOL 116 138   HDL 37* 43*   LDL 51 72   TRIG 139 114            Ordering of each unique test  I spent a total of 30 minutes on the day of the visit.   Time spent by me today doing chart review, history and exam, documentation and further activities per the note          Lala Larios is a 79 year old, presenting for the following health issues:  Syncope        2/17/2025     1:23 PM   Additional Questions   Roomed by Louise MARTIN MA   Accompanied by self         2/17/2025     1:23 PM   Patient Reported Additional Medications   Patient reports taking the following new medications none     History of Present Illness       Reason for visit:  Fainting situation last week  Symptom onset:  3-7 days ago  Symptoms include:  Dizzy  Symptom intensity:  Mild  Symptom progression:  Improving  Had these symptoms  before:  Yes  Has tried/received treatment for these symptoms:  No  What makes it better:  Drinking water.   She is taking medications regularly.   Super ball Sunday- possibly a lot of salt - watching game.  Then next day- busy day- running errands, stood for a long time, talking,   [employer]-was feeling hot, had  many layers - then was shopping at local [employer]- when    turned suddenly turned, felt/ heard whooshing sound in her ears and  fell backwards.    Luckily another customer caught her head and it never hit the ground. She reports she felt better- and did not want EMS called, that was offered and rest of the day- took it easy, stayed hydrated.      She does report the relief was instantaneous as soon as she fell  & head was lower. Hit her right  hip- probably but no bruise and no pain    Patient has experienced 2 other similar episodes within the last year- most recent was in January, other was 6-7 months ago  Patient has been monitoring her BP since episode occurred   BP readings as below:      Called Triage nurse  Was told to check Blood pressure  Its been in normal range  Last week Blood pressure   Tuesday 10am 129/61 hr 59  Wednesday morning  116/76 hr 69  Wednesday evening 152/65 hr 60  Thursday (today) morning before meds 155/69 hr 55          Review of Systems  Constitutional, neuro, ENT, endocrine, pulmonary, cardiac, gastrointestinal, genitourinary, musculoskeletal, integument and psychiatric systems are negative, except as otherwise noted.      Objective    /75 (BP Location: Left arm, Patient Position: Sitting, Cuff Size: Adult Regular)   Pulse 61   Temp 97.9  F (36.6  C) (Temporal)   Resp 16   SpO2 98%   There is no height or weight on file to calculate BMI.  Physical Exam   GENERAL: alert and no distress  EYES: Eyes grossly normal to inspection, PERRL and conjunctivae and sclerae normal  HENT: ear canals and TM's normal, nose and mouth without ulcers or lesions  NECK: no adenopathy,  no asymmetry, masses, or scars  RESP: lungs clear to auscultation - no rales, rhonchi or wheezes  CV: regular rate and rhythm, normal S1 S2, no S3 or S4, no murmur, click or rub, no peripheral edema  ABDOMEN: soft, nontender, no hepatosplenomegaly, no masses and bowel sounds normal  MS: no gross musculoskeletal defects noted, no edema  SKIN: no suspicious lesions or rashes  NEURO: Normal strength and tone, mentation intact and speech normal  PSYCH: mentation appears normal, affect normal/bright            Signed Electronically by: Bella Manuel MD

## 2025-02-17 NOTE — PATIENT INSTRUCTIONS
1. Vasovagal near syncope (Primary)  Avoid dehydration  Follow up with primary care physicain to discuss medications for hypertension in case , frequent near vasovagal episodes or standing up is causing lightheadedness- in that case- decreasing Blood pressure medications - is next best stop  - EKG 12-lead complete w/read - Clinics    2. Stage 3 chronic kidney disease, unspecified whether stage 3a or 3b CKD (H)  Avoid NSAID  - Basic metabolic panel  (Ca, Cl, CO2, Creat, Gluc, K, Na, BUN); Future    3. Hypertension goal BP (blood pressure) < 140/90  Plan: currently on amlodipine, Losartan, metoprolol and HCTZ  - Basic metabolic panel  (Ca, Cl, CO2, Creat, Gluc, K, Na, BUN); Future    4. Hyperlipidemia with target LDL less than 130  NO medications changes  Recent Labs   Lab Test 06/25/24  0851 06/16/23  0840   CHOL 116 138   HDL 37* 43*   LDL 51 72   TRIG 139 114

## 2025-02-23 DIAGNOSIS — I10 HYPERTENSION GOAL BP (BLOOD PRESSURE) < 140/90: ICD-10-CM

## 2025-02-24 RX ORDER — HYDROCHLOROTHIAZIDE 25 MG/1
25 TABLET ORAL DAILY
Qty: 90 TABLET | Refills: 1 | Status: SHIPPED | OUTPATIENT
Start: 2025-02-24

## 2025-03-02 ENCOUNTER — ANCILLARY PROCEDURE (OUTPATIENT)
Dept: GENERAL RADIOLOGY | Facility: CLINIC | Age: 80
End: 2025-03-02
Attending: PHYSICIAN ASSISTANT
Payer: COMMERCIAL

## 2025-03-02 ENCOUNTER — OFFICE VISIT (OUTPATIENT)
Dept: URGENT CARE | Facility: URGENT CARE | Age: 80
End: 2025-03-02
Payer: COMMERCIAL

## 2025-03-02 VITALS
BODY MASS INDEX: 29.29 KG/M2 | SYSTOLIC BLOOD PRESSURE: 130 MMHG | HEART RATE: 74 BPM | RESPIRATION RATE: 20 BRPM | WEIGHT: 176 LBS | DIASTOLIC BLOOD PRESSURE: 74 MMHG | OXYGEN SATURATION: 98 % | TEMPERATURE: 100 F

## 2025-03-02 DIAGNOSIS — R50.9 FEVER, UNSPECIFIED FEVER CAUSE: ICD-10-CM

## 2025-03-02 DIAGNOSIS — R05.1 ACUTE COUGH: ICD-10-CM

## 2025-03-02 DIAGNOSIS — R05.1 ACUTE COUGH: Primary | ICD-10-CM

## 2025-03-02 LAB
FLUAV AG SPEC QL IA: NEGATIVE
FLUBV AG SPEC QL IA: NEGATIVE

## 2025-03-02 PROCEDURE — 3075F SYST BP GE 130 - 139MM HG: CPT | Performed by: PHYSICIAN ASSISTANT

## 2025-03-02 PROCEDURE — 3078F DIAST BP <80 MM HG: CPT | Performed by: PHYSICIAN ASSISTANT

## 2025-03-02 PROCEDURE — 87635 SARS-COV-2 COVID-19 AMP PRB: CPT | Performed by: PHYSICIAN ASSISTANT

## 2025-03-02 PROCEDURE — 99214 OFFICE O/P EST MOD 30 MIN: CPT | Performed by: PHYSICIAN ASSISTANT

## 2025-03-02 PROCEDURE — 87804 INFLUENZA ASSAY W/OPTIC: CPT | Performed by: PHYSICIAN ASSISTANT

## 2025-03-02 PROCEDURE — 71046 X-RAY EXAM CHEST 2 VIEWS: CPT | Mod: TC | Performed by: RADIOLOGY

## 2025-03-02 RX ORDER — BENZONATATE 100 MG/1
CAPSULE ORAL
Qty: 45 CAPSULE | Refills: 0 | Status: SHIPPED | OUTPATIENT
Start: 2025-03-02

## 2025-03-02 RX ORDER — GUAIFENESIN 600 MG/1
TABLET, EXTENDED RELEASE ORAL
Qty: 30 TABLET | Refills: 0 | Status: SHIPPED | OUTPATIENT
Start: 2025-03-02

## 2025-03-03 LAB — SARS-COV-2 RNA RESP QL NAA+PROBE: NEGATIVE

## 2025-05-26 ENCOUNTER — PATIENT OUTREACH (OUTPATIENT)
Dept: CARE COORDINATION | Facility: CLINIC | Age: 80
End: 2025-05-26
Payer: COMMERCIAL

## 2025-06-08 ENCOUNTER — MYC MEDICAL ADVICE (OUTPATIENT)
Dept: FAMILY MEDICINE | Facility: CLINIC | Age: 80
End: 2025-06-08
Payer: COMMERCIAL

## 2025-06-09 NOTE — TELEPHONE ENCOUNTER
Dr. Wilkerson,    Please see below [employer] message and advise.     Thanks,   Christelle BANUELOS RN

## 2025-06-10 NOTE — TELEPHONE ENCOUNTER
Left message for patient to call [facility] back  When patient calls back please transfer to an RN  Thanks,  Bianka QUINTANILLA RN

## 2025-06-11 ENCOUNTER — VIRTUAL VISIT (OUTPATIENT)
Dept: FAMILY MEDICINE | Facility: CLINIC | Age: 80
End: 2025-06-11
Payer: COMMERCIAL

## 2025-06-11 DIAGNOSIS — M54.2 NECK PAIN: ICD-10-CM

## 2025-06-11 DIAGNOSIS — R10.11 RUQ ABDOMINAL PAIN: Primary | ICD-10-CM

## 2025-06-11 DIAGNOSIS — R73.09 ELEVATED GLUCOSE: ICD-10-CM

## 2025-06-11 DIAGNOSIS — R07.89 ATYPICAL CHEST PAIN: ICD-10-CM

## 2025-06-11 PROCEDURE — 98006 SYNCH AUDIO-VIDEO EST MOD 30: CPT | Performed by: FAMILY MEDICINE

## 2025-06-11 RX ORDER — METHOCARBAMOL 500 MG/1
500 TABLET, FILM COATED ORAL
Qty: 20 TABLET | Refills: 1 | Status: SHIPPED | OUTPATIENT
Start: 2025-06-11

## 2025-06-11 NOTE — PROGRESS NOTES
Harriet is a 80 year old who is being evaluated via a billable video visit.    How would you like to obtain your AVS? MyChart  If the video visit is dropped, the invitation should be resent by: Send to e-mail at: [email or web address]  Will anyone else be joining your video visit? No      Assessment & Plan     RUQ abdominal pain  RUQ abd pain - wonder about gallstone or other   Having some increase belching as well   Will check labs and ultrasound   - CBC with platelets; Future  - Comprehensive metabolic panel (BMP + Alb, Alk Phos, ALT, AST, Total. Bili, TP); Future  - Vitamin B12; Future  - Magnesium; Future  - US Abdomen Limited; Future    Neck pain  Neck spasm - trazepisu on left side  Will try robaxin at bedtime and salon pas during the day  Referral to PT   - methocarbamol (ROBAXIN) 500 MG tablet; Take 1 tablet (500 mg) by mouth nightly as needed for muscle spasms.  - Physical Therapy  Referral; Future    Elevated glucose     - Hemoglobin A1c; Future    Atypical chest pain  Suspect GERD or belching from possible gallbladder issues  Will check labs and ultrasound  If negative - consider PPI trial   - CBC with platelets; Future  - Comprehensive metabolic panel (BMP + Alb, Alk Phos, ALT, AST, Total. Bili, TP); Future  - Vitamin B12; Future  - Magnesium; Future  - US Abdomen Limited; Future                Subjective   Harriet is a 80 year old, presenting for the following health issues:  Neck Pain      6/11/2025     9:49 AM   Additional Questions   Roomed by daniel lackey   Accompanied by self     Video Start Time: 10:03am    History of Present Illness       Reason for visit:  Left neck pain       Neck Pain  Onset: 2 week(s) ago                  Any trauma: no                 Description of trauma: n/a   Description:   Location: left side of neck  Radiation: does not radiate  Constant/intermittent: intermittent                 Does movement of neck increase pain: YES- and hears a pop noise   Pain scale:  6/10  History:        Previous neck pain: no  Previous surgery or injections: no        Previous Imaging (MRI-X ray): no  Accompanying Signs & Symptoms:   Have any of the following:       Fever: No       Headache: No       Nausea and/or vomiting: No       Numbness, Burning, prickly sensation (Paresthesia) in arms: no       Any weakness in arms: no  Therapies tried and outcome: salonpas with minor relief    1. For some reason my neck has started when I turn my head. What would start this?    Neck stiff on the left side for 2 weeks   No trauma or injury   Had a crack in and since that time it has been painful with movement  Pain in the trapezius area with sore muscle and stiffness   Tried salon pas - it will soothe the pain but only using during the night - helps her sleep       2. Have had sharp pain in sides, mostly right side. Waist level. Liver? Drinking lot of water, of course to keep from being dehydrated at any time or fainting .   4-5 days ago   Having some sharp pains along right upper waistline  Seems to be aggravated by sweet food   One day just kept causing pain   Drank a lot of water   Was just one day   Has had similar symptoms in the remote past but never as persistent   Still having some diarrhea - really has to watch what she eats      3. Some dizziness continues especially when I start speaking before people, almost like a panic attack. I drink water to feel better or wring my hands. Thought it had to maybe do with my cataract surgery and lens?              Review of Systems  Constitutional, HEENT, cardiovascular, pulmonary, GI, , musculoskeletal, neuro, skin, endocrine and psych systems are negative, except as otherwise noted.      Objective           Vitals:  No vitals were obtained today due to virtual visit.    Physical Exam   GENERAL: alert and no distress  EYES: Eyes grossly normal to inspection.  No discharge or erythema, or obvious scleral/conjunctival abnormalities.  RESP: No audible  wheeze, cough, or visible cyanosis.    SKIN: Visible skin clear. No significant rash, abnormal pigmentation or lesions.  NEURO: Cranial nerves grossly intact.  Mentation and speech appropriate for age.  PSYCH: Appropriate affect, tone, and pace of words          Video-Visit Details    Type of service:  Video Visit   Video End Time:10:32 AM  Originating Location (pt. Location): Home    Distant Location (provider location):  On-site  Platform used for Video Visit: Edith  Signed Electronically by: Jolanta Wilkerson DO

## 2025-06-11 NOTE — TELEPHONE ENCOUNTER
Patient calling regarding neck pain mychart question below  Appears provider recommended visit   PCP had cancellation this morning, scheduled for VV now  Thanks,  Evelia HURLEY RN

## 2025-06-15 DIAGNOSIS — I10 HYPERTENSION GOAL BP (BLOOD PRESSURE) < 140/90: ICD-10-CM

## 2025-06-16 RX ORDER — HYDROCHLOROTHIAZIDE 25 MG/1
25 TABLET ORAL DAILY
Qty: 90 TABLET | Refills: 1 | OUTPATIENT
Start: 2025-06-16

## 2025-06-23 ENCOUNTER — ANCILLARY PROCEDURE (OUTPATIENT)
Dept: ULTRASOUND IMAGING | Facility: CLINIC | Age: 80
End: 2025-06-23
Attending: FAMILY MEDICINE
Payer: COMMERCIAL

## 2025-06-23 DIAGNOSIS — R10.11 RUQ ABDOMINAL PAIN: ICD-10-CM

## 2025-06-23 DIAGNOSIS — R07.89 ATYPICAL CHEST PAIN: ICD-10-CM

## 2025-06-23 PROCEDURE — 76705 ECHO EXAM OF ABDOMEN: CPT | Mod: GC | Performed by: RADIOLOGY

## 2025-06-29 ENCOUNTER — MYC MEDICAL ADVICE (OUTPATIENT)
Dept: FAMILY MEDICINE | Facility: CLINIC | Age: 80
End: 2025-06-29
Payer: COMMERCIAL

## 2025-06-30 DIAGNOSIS — E78.5 HYPERLIPIDEMIA WITH TARGET LDL LESS THAN 130: ICD-10-CM

## 2025-06-30 DIAGNOSIS — I10 HYPERTENSION GOAL BP (BLOOD PRESSURE) < 140/90: ICD-10-CM

## 2025-06-30 RX ORDER — PRAVASTATIN SODIUM 40 MG
40 TABLET ORAL DAILY
Qty: 90 TABLET | Refills: 0 | Status: SHIPPED | OUTPATIENT
Start: 2025-06-30

## 2025-06-30 RX ORDER — LOSARTAN POTASSIUM 25 MG/1
25 TABLET ORAL DAILY
Qty: 90 TABLET | Refills: 0 | Status: SHIPPED | OUTPATIENT
Start: 2025-06-30

## 2025-08-12 SDOH — HEALTH STABILITY: PHYSICAL HEALTH: ON AVERAGE, HOW MANY MINUTES DO YOU ENGAGE IN EXERCISE AT THIS LEVEL?: 20 MIN

## 2025-08-12 SDOH — HEALTH STABILITY: PHYSICAL HEALTH: ON AVERAGE, HOW MANY DAYS PER WEEK DO YOU ENGAGE IN MODERATE TO STRENUOUS EXERCISE (LIKE A BRISK WALK)?: 1 DAY

## 2025-08-12 ASSESSMENT — SOCIAL DETERMINANTS OF HEALTH (SDOH): HOW OFTEN DO YOU GET TOGETHER WITH FRIENDS OR RELATIVES?: ONCE A WEEK

## 2025-08-13 ENCOUNTER — OFFICE VISIT (OUTPATIENT)
Dept: FAMILY MEDICINE | Facility: CLINIC | Age: 80
End: 2025-08-13
Payer: COMMERCIAL

## 2025-08-13 VITALS
HEIGHT: 65 IN | DIASTOLIC BLOOD PRESSURE: 65 MMHG | TEMPERATURE: 97.2 F | BODY MASS INDEX: 29.19 KG/M2 | WEIGHT: 175.2 LBS | HEART RATE: 52 BPM | SYSTOLIC BLOOD PRESSURE: 104 MMHG | OXYGEN SATURATION: 96 % | RESPIRATION RATE: 18 BRPM

## 2025-08-13 DIAGNOSIS — R73.09 ELEVATED GLUCOSE: ICD-10-CM

## 2025-08-13 DIAGNOSIS — N63.23 MASS OF LOWER OUTER QUADRANT OF LEFT BREAST: ICD-10-CM

## 2025-08-13 DIAGNOSIS — R07.89 ATYPICAL CHEST PAIN: ICD-10-CM

## 2025-08-13 DIAGNOSIS — R10.11 RUQ ABDOMINAL PAIN: ICD-10-CM

## 2025-08-13 DIAGNOSIS — N18.30 STAGE 3 CHRONIC KIDNEY DISEASE, UNSPECIFIED WHETHER STAGE 3A OR 3B CKD (H): ICD-10-CM

## 2025-08-13 DIAGNOSIS — Z12.11 SCREEN FOR COLON CANCER: ICD-10-CM

## 2025-08-13 DIAGNOSIS — R05.1 ACUTE COUGH: ICD-10-CM

## 2025-08-13 DIAGNOSIS — Z00.00 ENCOUNTER FOR MEDICARE ANNUAL WELLNESS EXAM: Primary | ICD-10-CM

## 2025-08-13 DIAGNOSIS — E78.5 HYPERLIPIDEMIA WITH TARGET LDL LESS THAN 130: ICD-10-CM

## 2025-08-13 DIAGNOSIS — I10 HYPERTENSION GOAL BP (BLOOD PRESSURE) < 140/90: ICD-10-CM

## 2025-08-13 DIAGNOSIS — Z13.6 SCREENING FOR CARDIOVASCULAR CONDITION: ICD-10-CM

## 2025-08-13 LAB
ALBUMIN SERPL BCG-MCNC: 4 G/DL (ref 3.5–5.2)
ALP SERPL-CCNC: 79 U/L (ref 40–150)
ALT SERPL W P-5'-P-CCNC: 24 U/L (ref 0–50)
ANION GAP SERPL CALCULATED.3IONS-SCNC: 11 MMOL/L (ref 7–15)
AST SERPL W P-5'-P-CCNC: 26 U/L (ref 0–45)
BILIRUB SERPL-MCNC: 0.2 MG/DL
BUN SERPL-MCNC: 11.9 MG/DL (ref 8–23)
CALCIUM SERPL-MCNC: 9.7 MG/DL (ref 8.8–10.4)
CHLORIDE SERPL-SCNC: 105 MMOL/L (ref 98–107)
CHOLEST SERPL-MCNC: 114 MG/DL
CREAT SERPL-MCNC: 0.99 MG/DL (ref 0.51–0.95)
CREAT UR-MCNC: 85.5 MG/DL
EGFRCR SERPLBLD CKD-EPI 2021: 58 ML/MIN/1.73M2
ERYTHROCYTE [DISTWIDTH] IN BLOOD BY AUTOMATED COUNT: 13.1 % (ref 10–15)
EST. AVERAGE GLUCOSE BLD GHB EST-MCNC: 114 MG/DL
FASTING STATUS PATIENT QL REPORTED: YES
FASTING STATUS PATIENT QL REPORTED: YES
GLUCOSE SERPL-MCNC: 97 MG/DL (ref 70–99)
HBA1C MFR BLD: 5.6 % (ref 0–5.6)
HCO3 SERPL-SCNC: 25 MMOL/L (ref 22–29)
HCT VFR BLD AUTO: 35.8 % (ref 35–47)
HDLC SERPL-MCNC: 34 MG/DL
HGB BLD-MCNC: 12.3 G/DL (ref 11.7–15.7)
LDLC SERPL CALC-MCNC: 47 MG/DL
MAGNESIUM SERPL-MCNC: 2.1 MG/DL (ref 1.7–2.3)
MCH RBC QN AUTO: 26.4 PG (ref 26.5–33)
MCHC RBC AUTO-ENTMCNC: 34.4 G/DL (ref 31.5–36.5)
MCV RBC AUTO: 76.8 FL (ref 78–100)
MICROALBUMIN UR-MCNC: <12 MG/L
MICROALBUMIN/CREAT UR: NORMAL MG/G{CREAT}
NONHDLC SERPL-MCNC: 80 MG/DL
PLATELET # BLD AUTO: 258 10E3/UL (ref 150–450)
POTASSIUM SERPL-SCNC: 3.8 MMOL/L (ref 3.4–5.3)
PROT SERPL-MCNC: 7.5 G/DL (ref 6.4–8.3)
RBC # BLD AUTO: 4.66 10E6/UL (ref 3.8–5.2)
SODIUM SERPL-SCNC: 141 MMOL/L (ref 135–145)
TRIGL SERPL-MCNC: 164 MG/DL
VIT B12 SERPL-MCNC: 909 PG/ML (ref 232–1245)
WBC # BLD AUTO: 4.85 10E3/UL (ref 4–11)

## 2025-08-13 PROCEDURE — G0439 PPPS, SUBSEQ VISIT: HCPCS | Performed by: FAMILY MEDICINE

## 2025-08-13 PROCEDURE — 3078F DIAST BP <80 MM HG: CPT | Performed by: FAMILY MEDICINE

## 2025-08-13 PROCEDURE — 90480 ADMN SARSCOV2 VAC 1/ONLY CMP: CPT | Performed by: FAMILY MEDICINE

## 2025-08-13 PROCEDURE — 83735 ASSAY OF MAGNESIUM: CPT | Performed by: FAMILY MEDICINE

## 2025-08-13 PROCEDURE — 82607 VITAMIN B-12: CPT | Performed by: FAMILY MEDICINE

## 2025-08-13 PROCEDURE — 36415 COLL VENOUS BLD VENIPUNCTURE: CPT | Performed by: FAMILY MEDICINE

## 2025-08-13 PROCEDURE — 91320 SARSCV2 VAC 30MCG TRS-SUC IM: CPT | Performed by: FAMILY MEDICINE

## 2025-08-13 PROCEDURE — 80061 LIPID PANEL: CPT | Performed by: FAMILY MEDICINE

## 2025-08-13 PROCEDURE — 82570 ASSAY OF URINE CREATININE: CPT | Performed by: FAMILY MEDICINE

## 2025-08-13 PROCEDURE — 85027 COMPLETE CBC AUTOMATED: CPT | Performed by: FAMILY MEDICINE

## 2025-08-13 PROCEDURE — 3074F SYST BP LT 130 MM HG: CPT | Performed by: FAMILY MEDICINE

## 2025-08-13 PROCEDURE — 83036 HEMOGLOBIN GLYCOSYLATED A1C: CPT | Performed by: FAMILY MEDICINE

## 2025-08-13 PROCEDURE — 80053 COMPREHEN METABOLIC PANEL: CPT | Performed by: FAMILY MEDICINE

## 2025-08-13 PROCEDURE — 82043 UR ALBUMIN QUANTITATIVE: CPT | Performed by: FAMILY MEDICINE

## 2025-08-13 PROCEDURE — 1126F AMNT PAIN NOTED NONE PRSNT: CPT | Performed by: FAMILY MEDICINE

## 2025-08-13 PROCEDURE — 3044F HG A1C LEVEL LT 7.0%: CPT | Performed by: FAMILY MEDICINE

## 2025-08-13 PROCEDURE — 3048F LDL-C <100 MG/DL: CPT | Performed by: FAMILY MEDICINE

## 2025-08-13 RX ORDER — BENZONATATE 100 MG/1
CAPSULE ORAL
Qty: 45 CAPSULE | Refills: 0 | Status: SHIPPED | OUTPATIENT
Start: 2025-08-13

## 2025-08-13 RX ORDER — PRAVASTATIN SODIUM 40 MG
40 TABLET ORAL DAILY
Qty: 90 TABLET | Refills: 3 | Status: SHIPPED | OUTPATIENT
Start: 2025-08-13

## 2025-08-13 RX ORDER — HYDROCHLOROTHIAZIDE 25 MG/1
25 TABLET ORAL DAILY
Qty: 90 TABLET | Refills: 1 | Status: SHIPPED | OUTPATIENT
Start: 2025-08-13

## 2025-08-13 RX ORDER — LOSARTAN POTASSIUM 25 MG/1
25 TABLET ORAL DAILY
Qty: 90 TABLET | Refills: 1 | Status: SHIPPED | OUTPATIENT
Start: 2025-08-13

## 2025-08-13 ASSESSMENT — PAIN SCALES - GENERAL: PAINLEVEL_OUTOF10: NO PAIN (0)

## 2025-08-18 ENCOUNTER — ANCILLARY PROCEDURE (OUTPATIENT)
Dept: MAMMOGRAPHY | Facility: CLINIC | Age: 80
End: 2025-08-18
Attending: FAMILY MEDICINE
Payer: COMMERCIAL

## 2025-08-18 DIAGNOSIS — N63.23 MASS OF LOWER OUTER QUADRANT OF LEFT BREAST: ICD-10-CM

## 2025-08-18 PROCEDURE — G0279 TOMOSYNTHESIS, MAMMO: HCPCS | Performed by: RADIOLOGY

## 2025-08-18 PROCEDURE — 77065 DX MAMMO INCL CAD UNI: CPT | Mod: LT | Performed by: RADIOLOGY

## (undated) DEVICE — TUBING SUCTION 12"X1/4" N612

## (undated) DEVICE — GLOVE BIOGEL PI MICRO SZ 6.5 48565

## (undated) DEVICE — EYE KNIFE SLIT XSTAR VISITEC 2.5MM 45DEG BEVEL UP 373725

## (undated) DEVICE — SOL WATER IRRIG 500ML BOTTLE 2F7113

## (undated) DEVICE — EYE CANN IRR 30GA  ANTERIOR CHAMBER 581273

## (undated) DEVICE — SPECIMEN CONTAINER 3OZ W/FORMALIN 59901

## (undated) DEVICE — EYE SHIELD PLASTIC

## (undated) DEVICE — LINEN TOWEL PACK X5 5464

## (undated) DEVICE — SNARE CAPIVATOR ROUND COLD SNR BX10 M00561101

## (undated) DEVICE — EYE PACK CUSTOM ANTERIOR 30DEG TIP CENTURION PPK6682-04

## (undated) DEVICE — EYE KNIFE STILETTO VISITEC 1.1MM ANG 45DEG SIDEPORT 376620

## (undated) DEVICE — EYE TIP IRRIGATION & ASPIRATION POLYMER 35D BENT 8065751511

## (undated) DEVICE — PACK CATARACT CUSTOM ASC SEY15CPUMC

## (undated) DEVICE — KIT ENDO TURNOVER/PROCEDURE CARRY-ON 101822

## (undated) DEVICE — ENDO TRAP POLYP E-TRAP 00711099

## (undated) DEVICE — ENDO SNARE EXACTO COLD 9MM LOOP 2.4MMX230CM 00711115

## (undated) DEVICE — GOWN IMPERVIOUS 2XL BLUE

## (undated) DEVICE — SUCTION MANIFOLD NEPTUNE 2 SYS 1 PORT 702-025-000

## (undated) DEVICE — EYE CANN IRR 25GA CYSTOTOME 581610

## (undated) RX ORDER — ONDANSETRON 2 MG/ML
INJECTION INTRAMUSCULAR; INTRAVENOUS
Status: DISPENSED
Start: 2022-10-10

## (undated) RX ORDER — PROPOFOL 10 MG/ML
INJECTION, EMULSION INTRAVENOUS
Status: DISPENSED
Start: 2023-11-20

## (undated) RX ORDER — ACETAMINOPHEN 325 MG/1
TABLET ORAL
Status: DISPENSED
Start: 2023-11-13

## (undated) RX ORDER — FENTANYL CITRATE 50 UG/ML
INJECTION, SOLUTION INTRAMUSCULAR; INTRAVENOUS
Status: DISPENSED
Start: 2022-10-10

## (undated) RX ORDER — DIPHENHYDRAMINE HYDROCHLORIDE 50 MG/ML
INJECTION INTRAMUSCULAR; INTRAVENOUS
Status: DISPENSED
Start: 2022-10-10